# Patient Record
Sex: FEMALE | Race: WHITE | NOT HISPANIC OR LATINO | Employment: UNEMPLOYED | URBAN - METROPOLITAN AREA
[De-identification: names, ages, dates, MRNs, and addresses within clinical notes are randomized per-mention and may not be internally consistent; named-entity substitution may affect disease eponyms.]

---

## 2017-01-18 RX ORDER — GABAPENTIN 300 MG/1
300 CAPSULE ORAL 4 TIMES DAILY
COMMUNITY

## 2017-01-18 RX ORDER — SIMVASTATIN 20 MG
40 TABLET ORAL
COMMUNITY
End: 2020-11-10

## 2017-01-18 RX ORDER — DIPHENOXYLATE HYDROCHLORIDE AND ATROPINE SULFATE 2.5; .025 MG/1; MG/1
1 TABLET ORAL DAILY
COMMUNITY

## 2017-01-18 RX ORDER — CHOLECALCIFEROL (VITAMIN D3) 50 MCG
2000 TABLET ORAL DAILY
COMMUNITY

## 2017-01-23 ENCOUNTER — GENERIC CONVERSION - ENCOUNTER (OUTPATIENT)
Dept: OTHER | Facility: OTHER | Age: 57
End: 2017-01-23

## 2017-01-24 ENCOUNTER — APPOINTMENT (OUTPATIENT)
Dept: RADIOLOGY | Facility: HOSPITAL | Age: 57
End: 2017-01-24
Payer: COMMERCIAL

## 2017-01-24 ENCOUNTER — HOSPITAL ENCOUNTER (OUTPATIENT)
Facility: HOSPITAL | Age: 57
Setting detail: OUTPATIENT SURGERY
Discharge: HOME | End: 2017-01-24
Attending: NEUROLOGICAL SURGERY | Admitting: NEUROLOGICAL SURGERY
Payer: COMMERCIAL

## 2017-01-24 ENCOUNTER — ANESTHESIA (OUTPATIENT)
Dept: PERIOP | Facility: HOSPITAL | Age: 57
End: 2017-01-24
Payer: COMMERCIAL

## 2017-01-24 ENCOUNTER — ANESTHESIA EVENT (OUTPATIENT)
Dept: PERIOP | Facility: HOSPITAL | Age: 57
End: 2017-01-24
Payer: COMMERCIAL

## 2017-01-24 VITALS
OXYGEN SATURATION: 97 % | DIASTOLIC BLOOD PRESSURE: 70 MMHG | HEIGHT: 64 IN | RESPIRATION RATE: 16 BRPM | BODY MASS INDEX: 20.83 KG/M2 | TEMPERATURE: 97.2 F | HEART RATE: 82 BPM | SYSTOLIC BLOOD PRESSURE: 131 MMHG | WEIGHT: 122 LBS

## 2017-01-24 LAB
ABO GROUP BLD: NORMAL
BLD GP AB SCN SERPL QL: NEGATIVE
RH BLD: NEGATIVE

## 2017-01-24 PROCEDURE — C1820 GENERATOR NEURO RECHG BAT SY: HCPCS | Performed by: NEUROLOGICAL SURGERY

## 2017-01-24 PROCEDURE — 86850 RBC ANTIBODY SCREEN: CPT | Performed by: NEUROLOGICAL SURGERY

## 2017-01-24 PROCEDURE — 86900 BLOOD TYPING SEROLOGIC ABO: CPT | Performed by: NEUROLOGICAL SURGERY

## 2017-01-24 PROCEDURE — C1778 LEAD, NEUROSTIMULATOR: HCPCS | Performed by: NEUROLOGICAL SURGERY

## 2017-01-24 PROCEDURE — 72020 X-RAY EXAM OF SPINE 1 VIEW: CPT

## 2017-01-24 PROCEDURE — 86901 BLOOD TYPING SEROLOGIC RH(D): CPT | Performed by: NEUROLOGICAL SURGERY

## 2017-01-24 DEVICE — 3MM LEAD, 60 CM
Type: IMPLANTABLE DEVICE | Site: SPINE THORACIC | Status: NON-FUNCTIONAL
Brand: PENTA™
Removed: 2017-10-06

## 2017-01-24 DEVICE — IPG PROCLAIM XR5
Type: IMPLANTABLE DEVICE | Status: NON-FUNCTIONAL
Removed: 2017-10-06

## 2017-01-24 RX ORDER — ONDANSETRON 2 MG/ML
INJECTION INTRAMUSCULAR; INTRAVENOUS AS NEEDED
Status: DISCONTINUED | OUTPATIENT
Start: 2017-01-24 | End: 2017-01-24 | Stop reason: SURG

## 2017-01-24 RX ORDER — PROPOFOL 10 MG/ML
INJECTION, EMULSION INTRAVENOUS CONTINUOUS PRN
Status: DISCONTINUED | OUTPATIENT
Start: 2017-01-24 | End: 2017-01-24 | Stop reason: SURG

## 2017-01-24 RX ORDER — SUCCINYLCHOLINE CHLORIDE 20 MG/ML
INJECTION INTRAMUSCULAR; INTRAVENOUS AS NEEDED
Status: DISCONTINUED | OUTPATIENT
Start: 2017-01-24 | End: 2017-01-24 | Stop reason: SURG

## 2017-01-24 RX ORDER — MIDAZOLAM HYDROCHLORIDE 1 MG/ML
INJECTION INTRAMUSCULAR; INTRAVENOUS AS NEEDED
Status: DISCONTINUED | OUTPATIENT
Start: 2017-01-24 | End: 2017-01-24 | Stop reason: SURG

## 2017-01-24 RX ORDER — OXYCODONE HYDROCHLORIDE AND ACETAMINOPHEN 5; 325 MG/1; MG/1
2 TABLET ORAL EVERY 4 HOURS PRN
Status: DISCONTINUED | OUTPATIENT
Start: 2017-01-24 | End: 2017-01-24 | Stop reason: HOSPADM

## 2017-01-24 RX ORDER — SODIUM CHLORIDE, SODIUM LACTATE, POTASSIUM CHLORIDE, CALCIUM CHLORIDE 600; 310; 30; 20 MG/100ML; MG/100ML; MG/100ML; MG/100ML
20 INJECTION, SOLUTION INTRAVENOUS CONTINUOUS
Status: DISCONTINUED | OUTPATIENT
Start: 2017-01-24 | End: 2017-01-24 | Stop reason: HOSPADM

## 2017-01-24 RX ORDER — ROCURONIUM BROMIDE 10 MG/ML
INJECTION, SOLUTION INTRAVENOUS AS NEEDED
Status: DISCONTINUED | OUTPATIENT
Start: 2017-01-24 | End: 2017-01-24 | Stop reason: SURG

## 2017-01-24 RX ORDER — PROPOFOL 10 MG/ML
INJECTION, EMULSION INTRAVENOUS AS NEEDED
Status: DISCONTINUED | OUTPATIENT
Start: 2017-01-24 | End: 2017-01-24 | Stop reason: SURG

## 2017-01-24 RX ORDER — FENTANYL CITRATE/PF 50 MCG/ML
25 SYRINGE (ML) INJECTION
Status: DISCONTINUED | OUTPATIENT
Start: 2017-01-24 | End: 2017-01-24 | Stop reason: HOSPADM

## 2017-01-24 RX ORDER — TRIAMCINOLONE ACETONIDE 55 UG/1
SPRAY, METERED NASAL
COMMUNITY
End: 2020-08-31 | Stop reason: ALTCHOICE

## 2017-01-24 RX ORDER — BUPIVACAINE HYDROCHLORIDE AND EPINEPHRINE 5; 5 MG/ML; UG/ML
INJECTION, SOLUTION EPIDURAL; INTRACAUDAL; PERINEURAL AS NEEDED
Status: DISCONTINUED | OUTPATIENT
Start: 2017-01-24 | End: 2017-01-24 | Stop reason: HOSPADM

## 2017-01-24 RX ORDER — FENTANYL CITRATE 50 UG/ML
INJECTION, SOLUTION INTRAMUSCULAR; INTRAVENOUS AS NEEDED
Status: DISCONTINUED | OUTPATIENT
Start: 2017-01-24 | End: 2017-01-24 | Stop reason: SURG

## 2017-01-24 RX ADMIN — CEFAZOLIN SODIUM 2000 MG: 2 SOLUTION INTRAVENOUS at 15:05

## 2017-01-24 RX ADMIN — SUCCINYLCHOLINE CHLORIDE 100 MG: 20 INJECTION, SOLUTION INTRAMUSCULAR; INTRAVENOUS at 15:05

## 2017-01-24 RX ADMIN — FENTANYL CITRATE 100 MCG: 50 INJECTION, SOLUTION INTRAMUSCULAR; INTRAVENOUS at 15:30

## 2017-01-24 RX ADMIN — FENTANYL CITRATE 100 MCG: 50 INJECTION, SOLUTION INTRAMUSCULAR; INTRAVENOUS at 15:05

## 2017-01-24 RX ADMIN — PROPOFOL 100 MCG/KG/MIN: 10 INJECTION, EMULSION INTRAVENOUS at 15:00

## 2017-01-24 RX ADMIN — DEXAMETHASONE SODIUM PHOSPHATE 4 MG: 10 INJECTION INTRAMUSCULAR; INTRAVENOUS at 15:40

## 2017-01-24 RX ADMIN — PROPOFOL 150 MG: 10 INJECTION, EMULSION INTRAVENOUS at 15:05

## 2017-01-24 RX ADMIN — MIDAZOLAM HYDROCHLORIDE 2 MG: 1 INJECTION, SOLUTION INTRAMUSCULAR; INTRAVENOUS at 15:05

## 2017-01-24 RX ADMIN — ONDANSETRON 4 MG: 2 INJECTION INTRAMUSCULAR; INTRAVENOUS at 16:23

## 2017-01-24 RX ADMIN — PROPOFOL 50 MG: 10 INJECTION, EMULSION INTRAVENOUS at 15:30

## 2017-01-24 RX ADMIN — OXYCODONE HYDROCHLORIDE AND ACETAMINOPHEN 2 TABLET: 5; 325 TABLET ORAL at 18:55

## 2017-01-24 RX ADMIN — SODIUM CHLORIDE, SODIUM LACTATE, POTASSIUM CHLORIDE, AND CALCIUM CHLORIDE: .6; .31; .03; .02 INJECTION, SOLUTION INTRAVENOUS at 13:50

## 2017-01-24 RX ADMIN — ROCURONIUM BROMIDE 5 MG: 10 INJECTION, SOLUTION INTRAVENOUS at 15:05

## 2017-02-06 ENCOUNTER — ALLSCRIPTS OFFICE VISIT (OUTPATIENT)
Dept: OTHER | Facility: OTHER | Age: 57
End: 2017-02-06

## 2017-02-22 ENCOUNTER — GENERIC CONVERSION - ENCOUNTER (OUTPATIENT)
Dept: OTHER | Facility: OTHER | Age: 57
End: 2017-02-22

## 2017-02-25 ENCOUNTER — HOSPITAL ENCOUNTER (EMERGENCY)
Facility: HOSPITAL | Age: 57
Discharge: HOME | End: 2017-02-25
Attending: EMERGENCY MEDICINE | Admitting: EMERGENCY MEDICINE
Payer: COMMERCIAL

## 2017-02-25 VITALS
HEART RATE: 125 BPM | RESPIRATION RATE: 16 BRPM | SYSTOLIC BLOOD PRESSURE: 112 MMHG | WEIGHT: 125 LBS | TEMPERATURE: 97 F | HEIGHT: 64 IN | DIASTOLIC BLOOD PRESSURE: 60 MMHG | BODY MASS INDEX: 21.34 KG/M2

## 2017-02-25 DIAGNOSIS — H53.9 VISUAL CHANGES: Primary | ICD-10-CM

## 2017-02-25 PROCEDURE — 99283 EMERGENCY DEPT VISIT LOW MDM: CPT

## 2017-02-25 RX ORDER — PILOCARPINE HYDROCHLORIDE 10 MG/ML
1 SOLUTION/ DROPS OPHTHALMIC 4 TIMES DAILY
Status: DISCONTINUED | OUTPATIENT
Start: 2017-02-25 | End: 2017-02-25

## 2017-02-25 RX ORDER — TETRACAINE HYDROCHLORIDE 5 MG/ML
2 SOLUTION OPHTHALMIC ONCE
Status: DISCONTINUED | OUTPATIENT
Start: 2017-02-25 | End: 2017-02-25

## 2017-02-25 RX ORDER — TROPICAMIDE 10 MG/ML
1 SOLUTION/ DROPS OPHTHALMIC ONCE
Status: DISCONTINUED | OUTPATIENT
Start: 2017-02-25 | End: 2017-02-25

## 2017-02-25 RX ORDER — CYCLOPENTOLATE HYDROCHLORIDE 10 MG/ML
1 SOLUTION/ DROPS OPHTHALMIC ONCE
Status: DISCONTINUED | OUTPATIENT
Start: 2017-02-25 | End: 2017-02-25

## 2017-02-25 RX ORDER — PROPARACAINE HYDROCHLORIDE 5 MG/ML
SOLUTION/ DROPS OPHTHALMIC
Status: DISCONTINUED
Start: 2017-02-25 | End: 2017-02-25 | Stop reason: HOSPADM

## 2017-03-06 ENCOUNTER — ALLSCRIPTS OFFICE VISIT (OUTPATIENT)
Dept: OTHER | Facility: OTHER | Age: 57
End: 2017-03-06

## 2017-05-30 ENCOUNTER — TRANSCRIBE ORDERS (OUTPATIENT)
Dept: RADIOLOGY | Facility: CLINIC | Age: 57
End: 2017-05-30

## 2017-05-30 ENCOUNTER — HOSPITAL ENCOUNTER (OUTPATIENT)
Dept: RADIOLOGY | Facility: CLINIC | Age: 57
Discharge: HOME | End: 2017-05-30
Payer: COMMERCIAL

## 2017-05-30 ENCOUNTER — ALLSCRIPTS OFFICE VISIT (OUTPATIENT)
Dept: OTHER | Facility: OTHER | Age: 57
End: 2017-05-30

## 2017-05-30 DIAGNOSIS — M54.50 LOW BACK PAIN: ICD-10-CM

## 2017-05-30 DIAGNOSIS — M54.14 RADICULOPATHY OF THORACIC REGION: ICD-10-CM

## 2017-05-30 DIAGNOSIS — G89.4 CHRONIC PAIN SYNDROME: ICD-10-CM

## 2017-05-30 DIAGNOSIS — M54.16 RADICULOPATHY OF LUMBAR REGION: ICD-10-CM

## 2017-05-30 PROCEDURE — 72072 X-RAY EXAM THORAC SPINE 3VWS: CPT

## 2017-06-01 LAB
ALBUMIN SERPL BCP-MCNC: 4.2 G/DL (ref 3.6–5.1)
BUN SERPL-MCNC: 10 MG/DL (ref 7–25)
BUN/CREA RATIO (HISTORICAL): NORMAL (CALC) (ref 6–22)
CALCIUM SERPL-MCNC: 9.4 MG/DL (ref 8.6–10.4)
CHLORIDE SERPL-SCNC: 105 MMOL/L (ref 98–110)
CO2 SERPL-SCNC: 26 MMOL/L (ref 20–31)
CREAT SERPL-MCNC: 0.86 MG/DL (ref 0.5–1.05)
EGFR AFRICAN AMERICAN (HISTORICAL): 86 ML/MIN/1.73M2
EGFR-AMERICAN CALC (HISTORICAL): 74 ML/MIN/1.73M2
GLUCOSE (HISTORICAL): 76 MG/DL (ref 65–99)
PHOSPHATE SERPL-MCNC: 3.6 MG/DL (ref 2.5–4.5)
POTASSIUM SERPL-SCNC: 4.1 MMOL/L (ref 3.5–5.3)
SODIUM SERPL-SCNC: 140 MMOL/L (ref 135–146)

## 2017-06-06 ENCOUNTER — GENERIC CONVERSION - ENCOUNTER (OUTPATIENT)
Dept: OTHER | Facility: OTHER | Age: 57
End: 2017-06-06

## 2017-06-20 ENCOUNTER — GENERIC CONVERSION - ENCOUNTER (OUTPATIENT)
Dept: OTHER | Facility: OTHER | Age: 57
End: 2017-06-20

## 2017-06-20 ENCOUNTER — HOSPITAL ENCOUNTER (OUTPATIENT)
Dept: RADIOLOGY | Facility: HOSPITAL | Age: 57
Discharge: HOME | End: 2017-06-20
Attending: ANESTHESIOLOGY
Payer: COMMERCIAL

## 2017-06-20 DIAGNOSIS — M54.14 RADICULOPATHY OF THORACIC REGION: ICD-10-CM

## 2017-06-20 PROCEDURE — 72146 MRI CHEST SPINE W/O DYE: CPT

## 2017-06-26 ENCOUNTER — GENERIC CONVERSION - ENCOUNTER (OUTPATIENT)
Dept: OTHER | Facility: OTHER | Age: 57
End: 2017-06-26

## 2017-07-14 ENCOUNTER — GENERIC CONVERSION - ENCOUNTER (OUTPATIENT)
Dept: OTHER | Facility: OTHER | Age: 57
End: 2017-07-14

## 2017-07-14 ENCOUNTER — ALLSCRIPTS OFFICE VISIT (OUTPATIENT)
Dept: OTHER | Facility: OTHER | Age: 57
End: 2017-07-14

## 2017-07-17 ENCOUNTER — ALLSCRIPTS OFFICE VISIT (OUTPATIENT)
Dept: OTHER | Facility: OTHER | Age: 57
End: 2017-07-17

## 2017-07-19 ENCOUNTER — GENERIC CONVERSION - ENCOUNTER (OUTPATIENT)
Dept: OTHER | Facility: OTHER | Age: 57
End: 2017-07-19

## 2017-08-08 ENCOUNTER — ALLSCRIPTS OFFICE VISIT (OUTPATIENT)
Dept: OTHER | Facility: OTHER | Age: 57
End: 2017-08-08

## 2017-08-08 ENCOUNTER — GENERIC CONVERSION - ENCOUNTER (OUTPATIENT)
Dept: OTHER | Facility: OTHER | Age: 57
End: 2017-08-08

## 2017-08-17 ENCOUNTER — GENERIC CONVERSION - ENCOUNTER (OUTPATIENT)
Dept: OTHER | Facility: OTHER | Age: 57
End: 2017-08-17

## 2017-09-20 ENCOUNTER — GENERIC CONVERSION - ENCOUNTER (OUTPATIENT)
Dept: OTHER | Facility: OTHER | Age: 57
End: 2017-09-20

## 2017-09-28 ENCOUNTER — ALLSCRIPTS OFFICE VISIT (OUTPATIENT)
Dept: OTHER | Facility: OTHER | Age: 57
End: 2017-09-28

## 2017-10-03 RX ORDER — ESTRADIOL 0.07 MG/D
1 PATCH TRANSDERMAL WEEKLY
COMMUNITY
End: 2020-05-07

## 2017-10-03 NOTE — PRE-PROCEDURE INSTRUCTIONS
Pre-Surgery Instructions:   Medication Instructions    Ascorbic Acid (VITAMIN C PO) Patient was instructed by Physician and understands   Cholecalciferol (VITAMIN D) 2000 UNITS tablet Patient was instructed by Physician and understands   estradiol (CLIMARA) 0 075 MG/24HR Patient was instructed by Physician and understands   Fexofenadine HCl (ALLEGRA ALLERGY PO) Patient was instructed by Physician and understands   gabapentin (NEURONTIN) 300 mg capsule Instructed patient per Anesthesia Guidelines   multivitamin [facility]) TABS Patient was instructed by Physician and understands   Sertraline HCl (ZOLOFT PO) Instructed patient per Anesthesia Guidelines   simvastatin (ZOCOR) 20 mg tablet Patient was instructed by Physician and understands   Triamcinolone Acetonide 55 MCG/ACT AERO Patient was instructed by Physician and understands  Pre-op Showering Instructions for Surgery Patients    Before your operation, you play an important role in decreasing your risk for infection by washing with special antiseptic soap  This is an effective way to reduce bacteria on the skin which may help to prevent infections at the surgical site  Please read the following directions in advance  1  In the week before your operation, purchase a 4 ounce bottle of antiseptic soap containing chlorhexidine gluconate (CHG)  4%  Some brand names include: Aplicare®, Endure, and Hibiclens®  The cost is usually less than $5 00   For your convenience, the [facility] carries the soap   It may also be available at your doctors office or pre-admission testing center, and at most retail pharmacies   If you are allergic or sensitive to soaps containing CHG, please let your doctor know so another antiseptic can be suggested   CHG antiseptic soap is for external use only  2   The day before your operation, follow these instructions carefully to get ready   Please clean linens (sheets) on your bed; you should sleep on clean sheets after your evening shower   Get clean towels and washcloth ready - you need enough for 2 showers   Set aside clean underwear, pajamas, and clothing to wear after the showers     Reminders:   DO NOT use any other soap or body rinse on your skin during or after the antiseptic showers   DO NOT use lotion, powder, deodorant, or perfume/aftershave of any kind on your skin after your antiseptic shower   DO NOT shave any body parts in the 24 hours/day before your operation   DO NOT get the antiseptic soap in your eyes, ears, nose, mouth, or vaginal area    3  You will need to shower the night before AND the morning of your surgery  Shower 1:   The first evening before the operation, take the first shower   First, shampoo your hair with regular shampoo and rinse it completely before you use the antiseptic soap  After washing and rinsing your hair, rinse your body   Next, use a clean washcloth to apply the antiseptic soap and wash your body from the neck down to your toes using ½ bottle of the antiseptic soap   You will use the other ½ bottle for the second shower   Clean the area where your incision will be; lather this area well for about 2 minutes   If you are having head or neck surgery, wash areas with the antiseptic soap   Rinse yourself completely with running water   Use a clean towel to dry off   Wear clean underwear and clothing/pajamas  Shower 2   The morning of your operation, take the second shower following the same steps as Shower 1 using the second ½ of the bottle of antiseptic soap   Use clean cloths and towels to wash and dry yourself   Wear clean underwear and clothing

## 2017-10-03 NOTE — PRE-PROCEDURE INSTRUCTIONS
Pre-Surgery Instructions:   Medication Instructions    estradiol (CLIMARA) 0 075 MG/24HR Patient was instructed by Physician and understands

## 2017-10-05 ENCOUNTER — GENERIC CONVERSION - ENCOUNTER (OUTPATIENT)
Dept: OTHER | Facility: OTHER | Age: 57
End: 2017-10-05

## 2017-10-05 ENCOUNTER — ANESTHESIA EVENT (OUTPATIENT)
Dept: PERIOP | Facility: HOSPITAL | Age: 57
End: 2017-10-05
Payer: COMMERCIAL

## 2017-10-05 RX ORDER — BUPIVACAINE HYDROCHLORIDE AND EPINEPHRINE 5; 5 MG/ML; UG/ML
30 INJECTION, SOLUTION EPIDURAL; INTRACAUDAL; PERINEURAL ONCE
Status: CANCELLED | OUTPATIENT
Start: 2017-10-06

## 2017-10-06 ENCOUNTER — ANESTHESIA (OUTPATIENT)
Dept: PERIOP | Facility: HOSPITAL | Age: 57
End: 2017-10-06
Payer: COMMERCIAL

## 2017-10-06 ENCOUNTER — APPOINTMENT (OUTPATIENT)
Dept: RADIOLOGY | Facility: HOSPITAL | Age: 57
End: 2017-10-06
Payer: COMMERCIAL

## 2017-10-06 ENCOUNTER — HOSPITAL ENCOUNTER (OUTPATIENT)
Facility: HOSPITAL | Age: 57
Setting detail: OUTPATIENT SURGERY
Discharge: HOME | End: 2017-10-06
Attending: NEUROLOGICAL SURGERY | Admitting: NEUROLOGICAL SURGERY
Payer: COMMERCIAL

## 2017-10-06 VITALS
OXYGEN SATURATION: 99 % | HEART RATE: 80 BPM | BODY MASS INDEX: 21.51 KG/M2 | SYSTOLIC BLOOD PRESSURE: 132 MMHG | WEIGHT: 126 LBS | TEMPERATURE: 98.2 F | HEIGHT: 64 IN | DIASTOLIC BLOOD PRESSURE: 69 MMHG | RESPIRATION RATE: 18 BRPM

## 2017-10-06 LAB
ABO GROUP BLD: NORMAL
BLD GP AB SCN SERPL QL: NEGATIVE
GLUCOSE SERPL-MCNC: 162 MG/DL (ref 65–140)
RH BLD: NEGATIVE
SPECIMEN EXPIRATION DATE: NORMAL

## 2017-10-06 PROCEDURE — 86901 BLOOD TYPING SEROLOGIC RH(D): CPT | Performed by: NEUROLOGICAL SURGERY

## 2017-10-06 PROCEDURE — 82948 REAGENT STRIP/BLOOD GLUCOSE: CPT

## 2017-10-06 PROCEDURE — 86900 BLOOD TYPING SEROLOGIC ABO: CPT | Performed by: NEUROLOGICAL SURGERY

## 2017-10-06 PROCEDURE — 72070 X-RAY EXAM THORAC SPINE 2VWS: CPT

## 2017-10-06 PROCEDURE — 86850 RBC ANTIBODY SCREEN: CPT | Performed by: NEUROLOGICAL SURGERY

## 2017-10-06 RX ORDER — TRAMADOL HYDROCHLORIDE 50 MG/1
50 TABLET ORAL EVERY 6 HOURS PRN
Status: DISCONTINUED | OUTPATIENT
Start: 2017-10-06 | End: 2017-10-09 | Stop reason: HOSPADM

## 2017-10-06 RX ORDER — GLYCOPYRROLATE 0.2 MG/ML
INJECTION INTRAMUSCULAR; INTRAVENOUS AS NEEDED
Status: DISCONTINUED | OUTPATIENT
Start: 2017-10-06 | End: 2017-10-06 | Stop reason: SURG

## 2017-10-06 RX ORDER — OXYCODONE HYDROCHLORIDE AND ACETAMINOPHEN 5; 325 MG/1; MG/1
2 TABLET ORAL EVERY 4 HOURS PRN
Status: CANCELLED | OUTPATIENT
Start: 2017-10-06

## 2017-10-06 RX ORDER — FLUTICASONE PROPIONATE 50 MCG
1 SPRAY, SUSPENSION (ML) NASAL DAILY
COMMUNITY

## 2017-10-06 RX ORDER — CHLORHEXIDINE GLUCONATE 0.12 MG/ML
RINSE ORAL
Status: COMPLETED
Start: 2017-10-06 | End: 2017-10-06

## 2017-10-06 RX ORDER — SUCCINYLCHOLINE CHLORIDE 20 MG/ML
INJECTION INTRAMUSCULAR; INTRAVENOUS AS NEEDED
Status: DISCONTINUED | OUTPATIENT
Start: 2017-10-06 | End: 2017-10-06 | Stop reason: SURG

## 2017-10-06 RX ORDER — FENTANYL CITRATE 50 UG/ML
INJECTION, SOLUTION INTRAMUSCULAR; INTRAVENOUS AS NEEDED
Status: DISCONTINUED | OUTPATIENT
Start: 2017-10-06 | End: 2017-10-06 | Stop reason: SURG

## 2017-10-06 RX ORDER — CHLORHEXIDINE GLUCONATE 0.12 MG/ML
15 RINSE ORAL ONCE
Status: COMPLETED | OUTPATIENT
Start: 2017-10-06 | End: 2017-10-06

## 2017-10-06 RX ORDER — MIDAZOLAM HYDROCHLORIDE 1 MG/ML
INJECTION INTRAMUSCULAR; INTRAVENOUS AS NEEDED
Status: DISCONTINUED | OUTPATIENT
Start: 2017-10-06 | End: 2017-10-06 | Stop reason: SURG

## 2017-10-06 RX ORDER — ONDANSETRON 2 MG/ML
INJECTION INTRAMUSCULAR; INTRAVENOUS AS NEEDED
Status: DISCONTINUED | OUTPATIENT
Start: 2017-10-06 | End: 2017-10-06 | Stop reason: SURG

## 2017-10-06 RX ORDER — FENTANYL CITRATE/PF 50 MCG/ML
25 SYRINGE (ML) INJECTION
Status: DISCONTINUED | OUTPATIENT
Start: 2017-10-06 | End: 2017-10-09 | Stop reason: HOSPADM

## 2017-10-06 RX ORDER — PROPOFOL 10 MG/ML
INJECTION, EMULSION INTRAVENOUS AS NEEDED
Status: DISCONTINUED | OUTPATIENT
Start: 2017-10-06 | End: 2017-10-06 | Stop reason: SURG

## 2017-10-06 RX ORDER — PROPOFOL 10 MG/ML
INJECTION, EMULSION INTRAVENOUS CONTINUOUS PRN
Status: DISCONTINUED | OUTPATIENT
Start: 2017-10-06 | End: 2017-10-06 | Stop reason: SURG

## 2017-10-06 RX ORDER — BUPIVACAINE HYDROCHLORIDE AND EPINEPHRINE 5; 5 MG/ML; UG/ML
INJECTION, SOLUTION EPIDURAL; INTRACAUDAL; PERINEURAL AS NEEDED
Status: DISCONTINUED | OUTPATIENT
Start: 2017-10-06 | End: 2017-10-06 | Stop reason: HOSPADM

## 2017-10-06 RX ORDER — SODIUM CHLORIDE, SODIUM LACTATE, POTASSIUM CHLORIDE, CALCIUM CHLORIDE 600; 310; 30; 20 MG/100ML; MG/100ML; MG/100ML; MG/100ML
20 INJECTION, SOLUTION INTRAVENOUS CONTINUOUS
Status: DISPENSED | OUTPATIENT
Start: 2017-10-06 | End: 2017-10-07

## 2017-10-06 RX ORDER — ROCURONIUM BROMIDE 10 MG/ML
INJECTION, SOLUTION INTRAVENOUS AS NEEDED
Status: DISCONTINUED | OUTPATIENT
Start: 2017-10-06 | End: 2017-10-06 | Stop reason: SURG

## 2017-10-06 RX ORDER — ONDANSETRON 2 MG/ML
4 INJECTION INTRAMUSCULAR; INTRAVENOUS EVERY 6 HOURS PRN
Status: DISCONTINUED | OUTPATIENT
Start: 2017-10-06 | End: 2017-10-09 | Stop reason: HOSPADM

## 2017-10-06 RX ORDER — SUCCINYLCHOLINE CHLORIDE 20 MG/ML
INJECTION INTRAMUSCULAR; INTRAVENOUS AS NEEDED
Status: DISCONTINUED | OUTPATIENT
Start: 2017-10-06 | End: 2017-10-06

## 2017-10-06 RX ADMIN — PROPOFOL 200 MG: 10 INJECTION, EMULSION INTRAVENOUS at 12:15

## 2017-10-06 RX ADMIN — ROCURONIUM BROMIDE 5 MG: 10 INJECTION, SOLUTION INTRAVENOUS at 12:15

## 2017-10-06 RX ADMIN — CHLORHEXIDINE GLUCONATE 15 ML: 1.2 RINSE ORAL at 10:55

## 2017-10-06 RX ADMIN — FENTANYL CITRATE 50 MCG: 50 INJECTION, SOLUTION INTRAMUSCULAR; INTRAVENOUS at 12:15

## 2017-10-06 RX ADMIN — PROPOFOL 150 MCG/KG/MIN: 10 INJECTION, EMULSION INTRAVENOUS at 12:20

## 2017-10-06 RX ADMIN — TRAMADOL HYDROCHLORIDE 50 MG: 50 TABLET, COATED ORAL at 14:57

## 2017-10-06 RX ADMIN — SODIUM CHLORIDE, SODIUM LACTATE, POTASSIUM CHLORIDE, AND CALCIUM CHLORIDE 20 ML/HR: .6; .31; .03; .02 INJECTION, SOLUTION INTRAVENOUS at 13:50

## 2017-10-06 RX ADMIN — MIDAZOLAM HYDROCHLORIDE 2 MG: 1 INJECTION, SOLUTION INTRAMUSCULAR; INTRAVENOUS at 12:15

## 2017-10-06 RX ADMIN — ONDANSETRON 4 MG: 2 INJECTION INTRAMUSCULAR; INTRAVENOUS at 13:26

## 2017-10-06 RX ADMIN — CHLORHEXIDINE GLUCONATE 15 ML: 0.12 RINSE ORAL at 10:55

## 2017-10-06 RX ADMIN — SODIUM CHLORIDE, SODIUM LACTATE, POTASSIUM CHLORIDE, AND CALCIUM CHLORIDE 20 ML/HR: .6; .31; .03; .02 INJECTION, SOLUTION INTRAVENOUS at 11:12

## 2017-10-06 RX ADMIN — SUCCINYLCHOLINE CHLORIDE 100 MG: 20 INJECTION, SOLUTION INTRAMUSCULAR; INTRAVENOUS at 12:15

## 2017-10-06 RX ADMIN — CEFAZOLIN SODIUM 2000 MG: 2 SOLUTION INTRAVENOUS at 12:14

## 2017-10-06 RX ADMIN — GLYCOPYRROLATE 0.2 MG: 0.2 INJECTION, SOLUTION INTRAMUSCULAR; INTRAVENOUS at 12:15

## 2017-10-06 RX ADMIN — DEXAMETHASONE SODIUM PHOSPHATE 8 MG: 10 INJECTION INTRAMUSCULAR; INTRAVENOUS at 12:20

## 2017-10-06 RX ADMIN — FENTANYL CITRATE 25 MCG: 50 INJECTION INTRAMUSCULAR; INTRAVENOUS at 14:18

## 2017-10-06 NOTE — ANESTHESIA POSTPROCEDURE EVALUATION
Post-Op Assessment Note      CV Status:  Stable    Mental Status:  Alert and awake    Hydration Status:  Euvolemic    PONV Controlled:  Controlled    Airway Patency:  Patent    Post Op Vitals Reviewed:  Yes              /59 (10/06/17 1342)    Temp (!) 97 4 °F (36 3 °C) (10/06/17 1342)    Pulse 88 (10/06/17 1342)   Resp 13 (10/06/17 1342)    SpO2

## 2017-10-06 NOTE — ANESTHESIA PREPROCEDURE EVALUATION
Review of Systems/Medical History  Patient summary reviewed  Chart reviewed  History of anesthetic complications PONV    Cardiovascular  Hyperlipidemia,    Pulmonary       GI/Hepatic            Endo/Other     GYN       Hematology   Musculoskeletal  Back pain , chronic back pain,        Neurology    Neuromuscular disease ,    Psychology   Psychiatric history, Anxiety,            Physical Exam    Airway    Mallampati score: I         Dental   Comment: Scattered rear crowns,     Cardiovascular  Cardiovascular exam normal    Pulmonary  Pulmonary exam normal     Other Findings        Anesthesia Plan  ASA Score- 2       Anesthesia Type- general        Induction- intravenous      Informed Consent  Anesthetic plan and risks discussed with patient

## 2017-10-06 NOTE — ADDENDUM NOTE
Addendum  created 10/06/17 1682 by Diamond Church MD    Anesthesia Review and Sign - Ready for Procedure

## 2017-10-06 NOTE — OP NOTE
OPERATIVE REPORT  PATIENT NAME: Kerry Antonio    :  1960  MRN: [id number]  Pt Location: [facility] OR ROOM 01    SURGERY DATE: 10/6/2017    Surgeon(s) and Role:     * Keisha Watts MD - Primary     * Janette Singh PA-C - Assisting  No qualified resident available for exposure  PA Present throughout procedure  Assisted with exposure and wound closure providing essential assistance throughout including retraction and hemostasis to achieve the necessary surgical goal       Preop Diagnosis:  Chronic pain syndrome [G89 4]  Radiculopathy of lumbar region [M54 16]    Post-Op Diagnosis Codes:     * Chronic pain syndrome [G89 4]     * Radiculopathy of lumbar region [M54 16]    Specimen(s):  * No specimens in log *    Estimated Blood Loss:   Minimal    Drains:       Anesthesia Type:   General    Operative Indications:  Chronic pain syndrome [G89 4]  Radiculopathy of lumbar region [M54 16]  Thoracic radiculitis    Complications:   None    Procedure and Technique:  1  Removal of a thoracic spinal cord stimulator paddle electrode placed via T9-10 laminectomy    2  Removal of a left buttock implantable pulse generator and      Operative Findings:  SSEPSStable  Lead removed at its entry site    Removed [facility]  [employer] IPG    Indications for procedure; This is a 68-year-old female with a long-standing history of chronic pain involving the lower back and lower extremities  Previous placed SCS that delivers efficacy, but reports new thoracic radiculitis and requests removal  The risks and benefits of the procedure reviewed with the patient and family and they wished to proceed  Description of procedure; The patient was identified and brought to the operating room where they underwent the induction of general anesthesia  Placed prone on the operating room table with chest rolls  Prepped and draped in the usual sterile fashion  2g of Ancef was administered as antibiotic prophylaxis  Bilateral lower extremity SCDs were placed for DVT prophylaxis  A timeout was then performed  Live fluoroscopic imaging was performed in order to lindsay appropriate positioning of the spine as well as prior skin incision  The thoracic midline incision was incised with a 10 blade after it was anesthetized with 1% lidocaine with epinephrine  Bovie electrocautery dissected the subcutaneous tissue down to the underlying remaining spinous processes  Fluoroscopic imaging then confirmed appropriate level  Once this was confirmed the muscle was dissected off in a medial to lateral direction exposing the underlying remianing lamina of T9 and T10  The strain relief loop was also identified and removed from the tissue  The lead was followed to its entry  Into the epidural space  A small laminectomy was then completed utilizing the [employer] drill bit and Kerrison punches to expose the base of the paddle  Nerve hook was then passed freely beneath the scar capsule freeing the base of the paddle  The paddle was then removed without resitance  Attention was then placed on the prior left buttock incision which was anesthetized with 1% lidocaine with epinephrine and incised with a 10 blade  Bovie electrocautery dissected the subcutaneous tissue  Then using sharp and blunt dissection the previous placed generator was freed from the pocket and  from the distal portion of the electrode and removed  The lead was then pulled through to the thoracic incision and removed  Both incisions were copiously irrigated with antibiotic-induced solution  Closure was begun  Thoracic paraspinal musculature and fascia was reapproximated with an interrupted 2-0 Vicryl plus suture  The skin was then approximated with an interrupted inverted 2-0 Vicryl plus suture with stainless steel staples for the skin  The left buttock incision had the pocket closed upon itself with an interrupted 2-0 Vicryl plus suture   The skin was approximated with interrupted inverted 2-0 Vicryl plus suture with stainless steel staples for the skin  Routine sterile dressings were then applied  At the end of the case all counts were reported to be correct  There was no breaks in surgical technique or complications encountered during the procedure  The patient woke from anesthesia in stable condition being taken to the recovery room         I was present for the entire procedure    Patient Disposition:  extubated and stable    SIGNATURE: Nicola Barraza MD  DATE: October 6, 2017  TIME: 1:34 PM

## 2017-10-06 NOTE — INTERIM OP NOTE
REMOVAL OF A THORACIC SPINAL CORD STIMULATOR PLACED VIA LAMINECTOMY AND REMOVAL OF LEFT BUTTOCK IMPLANTABLE PULSE GENERATOR (IMPULSE MONITORING-SSEP)  Postoperative Note  PATIENT NAME: Pao Camera  : 1960  MRN: [id number]  [facility] RO[facility]    Surgery Date: 10/6/2017    Preop Diagnosis:  Chronic pain syndrome [G89 4]  Radiculopathy of lumbar region [M54 16]    Post-Op Diagnosis Codes:     * Chronic pain syndrome [G89 4]     * Radiculopathy of lumbar region [M54 16]    Procedure(s) (LRB):  REMOVAL OF A THORACIC SPINAL CORD STIMULATOR PLACED VIA LAMINECTOMY AND REMOVAL OF LEFT BUTTOCK IMPLANTABLE PULSE GENERATOR (IMPULSE MONITORING-SSEP) (N/A)    Surgeon(s) and Role:     * Ganesh Zavala MD - Primary     * Esequiel Suh PA-C - Assisting    Specimens:  * No specimens in log *    Estimated Blood Loss:   Minimal    Anesthesia Type:   General     Findings:    SSEPS Stable    Complications:   None    SIGNATURE: Ganesh Zavala MD   DATE: 2017   TIME: 1:26 PM

## 2017-10-06 NOTE — DISCHARGE INSTRUCTIONS
Follow Up Dr Ebony Ceballos 2 weeks  Remove Dressing 3 days  Antibiotics prescription at pharmacy  Oxycodone prescription for pain  No NSAIDs ( aspirin, motrin, ibuprofen, etc  ) for 2 weeks may resume after that    [address] [place]    What happens when I go home after the Spinal Cord Stimulator? Surgical site: The dressing may be removed after 3 days and left off  There is no special care for your incision  Activity:   Resume normal activity level, but limit bending, lifting, and twisting for 2 weeks  Bathing: You may shower after 3 days  Driving: You may resume driving in one week  Pain Medicine: If you were given a prescription for a new pain medicine, it can be taken in addition to any pain medicine you are already taking  The new medicine should only be needed for about one week to help with any surgical pain  Take the new medicine only as needed  Please call the office at [phone] if you have any of the followin  Redness, swelling, heat, or unusual drainage (green, yellow, white, smelly)   from the surgical site  2  Heavy bleeding from the surgical site  3  Chills or fever above 101 degrees   4  Pain not relieved by medicine  5  Questions or concerns about your surgery

## 2017-10-09 ENCOUNTER — GENERIC CONVERSION - ENCOUNTER (OUTPATIENT)
Dept: OTHER | Facility: OTHER | Age: 57
End: 2017-10-09

## 2017-10-20 ENCOUNTER — ALLSCRIPTS OFFICE VISIT (OUTPATIENT)
Dept: OTHER | Facility: OTHER | Age: 57
End: 2017-10-20

## 2017-11-20 ENCOUNTER — GENERIC CONVERSION - ENCOUNTER (OUTPATIENT)
Dept: OTHER | Facility: OTHER | Age: 57
End: 2017-11-20

## 2017-12-15 ENCOUNTER — GENERIC CONVERSION - ENCOUNTER (OUTPATIENT)
Dept: OTHER | Facility: OTHER | Age: 57
End: 2017-12-15

## 2018-01-10 NOTE — MISCELLANEOUS
Message   Recorded as Task   Date: 06/20/2017 10:23 AM, Created By: Josephine Brar   Task Name: Miscellaneous   Assigned To: [facility] [place] [facility],Team   Regarding Patient: Alva Cuba, Status: Active   Comment:    Josephine Brar - 20 Jun 2017 10:23 AM     TASK CREATED  phoen call from Ryan Doan with [facility] requesting a new rx for patient for mri without contrast, patient is only able to be in mri for ceratin amount of time  if any questions can call [phone]  Tianna Gonzalez - 20 Jun 2017 11:34 AM     TASK EDITED  s/w Jenny Moreland at [facility] MRI  Pt w/ [facility] SCS  No more than 30 min for mri w/ st  renaldo monitoring  Needs a new order for mri w/o contrast  Per adeline, adequate visualization has already been completed w/o contrast   new order can be faxed to [phone]    ***New MRI order please - "w/o contrast" Thanks! 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)   6  Lumbar radiculopathy, chronic (724 4) (M54 16)   7  Pain syndrome, chronic (338 4) (G89 4)   8  Postop check (V67 00) (Z09)   9  Pre-procedural examination (V72 84) (Z01 818)   10  Pre-procedure lab exam (V72 63) (Z01 812)   11  Spondylosis of lumbar region without myelopathy or radiculopathy (721 3) (M47 816)   12  Status post surgery (V45 89) (Z98 890)   13  Thoracic radiculitis (724 4) (M54 14)   14  Transverse 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) (G37 3)    Current Meds   1  Estradiol 0 1 MG/24HR Transdermal Patch Weekly; Therapy: (Recorded:18Oct2016) to Recorded   2   Gabapentin 300 MG Oral Capsule Recorded   3  Methocarbamol 750 MG Oral Tablet; TAKE 1 TABLET Every 6 hours PRN For muscle   spasms; Therapy: [id number] to (Evaluate:03Mar2017)  Requested for: 83Yaz8593; Last   Rx:[id number] Ordered   4  PredniSONE 10 MG Oral Tablet; Take as directed according to info sheet given at office; Therapy: [id number] to (Evaluate:06Jun2017)  Requested for: 79IGK8541; Last   Rx:[id number] Ordered   5  Simvastatin 20 MG Oral Tablet Recorded   6  Zoloft 50 MG Oral Tablet (Sertraline HCl) Recorded    Allergies    1  Demerol TABS   2   TEGretol TABS    Signatures   Electronically signed by : Tianna Gillette, ; Jun 20 2017  1:17PM EST                       (Author)

## 2018-01-10 NOTE — PROGRESS NOTES
Assessment    1  Lumbar radiculopathy, chronic (724 4) (M54 16)   2  Pain syndrome, chronic (338 4) (G89 4)    Plan  Pain syndrome, chronic    · Follow-up PRN Evaluation and Treatment  Follow-up  Status: Complete  Done:  [id number]   Ordered; For: Pain syndrome, chronic; Ordered By: Claudean Loving Performed:  Due: [id number]    Discussion/Summary    This is a 61 yo female with LBP and bilateral leg pain History of transverse myelitis  Underwent successful SCS trial with Dr Carol Burns  She is now 6 weeks from SCS placement and doing well  No programming adjustments needed  Not taking pain medications  F/U as needed with me  Chief Complaint  Patient presents to office for consult of SCS placement after trial       Post-Op  HPI: She is 6 weeks from SCS placement and doing very well  She is noting near complete pain relief  Denies incisional pain  No fevers or chills  She does have mild residual left sided muscle spasms near the incision which has been improving, as well as mild IPG discomfort with pushing n the device  She is off all pain medications  She is very pleased with the coverage and pain relief  In review, this is a very pleasant 61 yo female with a greater than 20 year history of chronic pain involving her lower back and bilateral Legs  She reports a constant sharp lower back pain She also has bilateral anterior thigh sharp pain, and intermittent bilateral posterior leg pain to her ankle  Her sharp back pain includes muscle spams and is worse than her leg pain  Her pain started after being diagnosed with spinal MS versus transverse myelitis in 1999  She presents now after undergoing a successful SCS trial with Dr Carol Burns obtaining 95% pain relief  She used primarily Burst programming  She also takes Gabapentin with some relief of her leg pain  Review of Systems    Constitutional: No fever, no chills, feels well, no tiredness, no recent weight gain or weight loss     Eyes: No complaints of eye pain, no red eyes, no eyesight problems, no discharge, no dry eyes, no itching of eyes  ENT: no complaints of earache, no loss of hearing, no nose bleeds, no nasal discharge, no sore throat, no hoarseness  Cardiovascular: No complaints of slow heart rate, no fast heart rate, no chest pain, no palpitations, no leg claudication, no lower extremity edema  Respiratory: No complaints of shortness of breath, no wheezing, no cough, no SOB on exertion, no orthopnea, no PND  Gastrointestinal: No complaints of abdominal pain, no constipation, no nausea or vomiting, no diarrhea, no bloody stools  Genitourinary: No complaints of dysuria, no incontinence, no pelvic pain, no dysmenorrhea, no vaginal discharge or bleeding  Musculoskeletal: limb pain and bilateral leg pain worse on right, but no arthralgias, no joint swelling, no myalgias, no joint stiffness and no limb swelling  Integumentary: No complaints of skin rash or lesions, no itching, no skin wounds, no breast pain or lump  Neurological: headache, tingling, limb weakness and difficulty walking, but no numbness, no confusion, no dizziness, no convulsions and no fainting  Psychiatric: anxiety and depression  Endocrine: No complaints of proptosis, no hot flashes, no muscle weakness, no deepening of the voice, no feelings of weakness  Hematologic/Lymphatic: No complaints of swollen glands, no swollen glands in the neck, does not bleed easily, does not bruise easily  Active Problems    1  Chronic left hip pain (495 13,281 49) (M25 552,G89 29)   2  Chronic low back pain (724 2,338 29) (M54 5,G89 29)   3  Chronic myofascial pain (729 1,338 29) (M79 1,G89 29)   4  Encounter for staple removal (V58 32) (Z48 02)   5  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)   6  Lumbar radiculopathy, chronic (724 4) (M54 16)   7  Pain syndrome, chronic (338 4) (G89 4)   8  Postop check (V67 00) (Z09)   9  Pre-procedural examination (V72 84) (Z01 818)   10   Pre-procedure lab exam (V72 63) (Z01 812)   11  Spondylosis of lumbar region without myelopathy or radiculopathy (721 3) (M47 816)   12  Status post surgery (V45 89) (Z98 890)   13  Transverse 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) (G37 3)    Social History    · Marital History - Currently    · Never A Smoker   · Never Drank Alcohol  The social history was reviewed and updated today  Current Meds   1  Estradiol 0 1 MG/24HR Transdermal Patch Weekly; Therapy: (Recorded:18Oct2016) to Recorded   2  Gabapentin 300 MG Oral Capsule Recorded   3  Methocarbamol 750 MG Oral Tablet; TAKE 1 TABLET Every 6 hours PRN For muscle   spasms; Therapy: [id number] to (Evaluate:03Mar2017)  Requested for: 57Frw7488; Last   Rx:[id number] Ordered   4  Simvastatin 20 MG Oral Tablet Recorded   5  Zoloft 50 MG Oral Tablet Recorded    The medication list was reviewed and updated today  Allergies    1  Demerol TABS   2  TEGretol TABS    Vitals   Recorded: 10ENK5237 08:09AM   Temperature 98 7 F, Tympanic   Heart Rate 84, L Brachial Artery   Pulse Quality Normal, L Brachial Artery   Respiration Quality Normal   Respiration 16   Systolic 95, LUE, Sitting   Diastolic 78, LUE, Sitting   Height 5 ft 4 in   Weight 126 lb    BMI Calculated 21 63   BSA Calculated 1 61     Physical Exam     Constitutional Patient appears healthy and well developed  Head and Face Normal on inspection  Eyes Fundoscopic exam is benign  Neck No JVD  Carotid pulses: 2+ and equal bilaterally, without bruits  Respiratory Auscultation of lungs: Clear to auscultation  No rales, rhonchi, wheezes appreciated over the lungs bilaterally  Cardiovascular Auscultation of heart: Rhythm is regular, no gallop or rubs  No heart murmur appreciated  Peripheral vascular exam: Pedal Pulses: 2+ and equal bilaterally  Radial pulses: 2+ and equal bilaterally  Extremities: Peripheral circulation is normal    Abdomen Soft, nontender, and nondistended without guarding, rigidity or rebound tenderness  Musculo: Spine Contour is normal  No tenderness of the spine billaterally  Skin warm and dry  Incisions well healed  Neurologic - Mental Status: Alert and Oriented x3  Mood and affect: Affect is normal   Memory is grossly intact  Attention is WNL  [place] Judi Speech: Language is fluent  Cranial Nerve Exam:  2nd cranial nerve: Intact  3rd, 4th, and 6th cranial nerves: Intact  5th cranial nerve: Intact  7th cranial nerve: Intact  8th cranial nerve: Intact  9th and 10th cranial nerves: Intact  11th cranial nerve: Intact  12th cranial nerve: Intact  Motor System General Motor Strength: No pronator drift and no parietal drift  Motor System - Upper Extremities: Muscle strength: 5/5 bilaterally  Muscle tone: Normal bilaterally  Motor System - Lower Extremities: Muscle strength: 5/5 bilaterally  Muscle tone: Normal bilaterally  Reflexes: DTR's are brisk, symmetric and 2+ bilaterally  Babinski's reflex is down going bilaterally symmetrical bilaterally  Coordination: Normal    Sensory: Sensation grossly intact to light touch  Sensation grossly intact to pinprick  Gait and Station: Cherry Log with a normal gait  Results/Data    I personally reviewed the SCS trial, MRI T/L spine in detail with the patient  X-ray Review Reviewed images and report of SCS trial with Dr Caridad Candelario  Single midline 8 contact St  Renalod perc lead placed to top T8 using middle contacts  Primary BurstDR programming  MRI Review Reviewed images and report of MRI thoracic spine 6/2016  Mild DDD  T9-10 facet overgrowth  Remnants of possible old transverse myelitis  Reviewed images and report of MRI lumbar spine 6/2016  Mild DDD  Surgery Scheduling Form  Neurosurgery Procedure Scheduling Form [facility] Standard:   Location: [facility]   Confirmation Number:   Procedure Date: 1/24/17   Requested Time:   Surgeon: Osiel Downey  Co-Surgeon:   [facility] Required:   Bed: Out Patient - No Bed Required     Anesthesia: General      PROCEDURE DETAILS   Procedure: Placement Thoracic SCS with left buttock IPG  Laterality/Level: Anticipated frozen section:   CPT Code(s): Y3468919, [id number]   Pre-Op Diagnosis: Chronic pain, lumbar radic  Dx Code(s): G89 4, M54 16    Length of Procedure: 2 hours  Equipment: C-Arm  Equipment Needs: Prone, Motors (tceMEP), EMG, SSEP, Impulse Monitoring Confirmation # : [id number]  Implants/Representative: St  Renaldo   Is the patient able to walk up a flight of stairs, walk up a hill or do heavy housework WITHOUT having chest pain or shortness of breath? REGISTRATION & FINANCIAL CLEARANCE    FA Initials:   Insurance: [facility]   Policy Number: [id number] Group Number:     PRE-ADMISSION TESTING/CLINICAL INFORMATION   PAT Location: Outpatient Testing Elsewhere     Communication Barrier:   Primary Care Physician: Miesha Card  CONSULTS NEEDED:   Anesthesia Consult:   Medical Consult:   Cardiac Consult:     ALLERGIES AND ALERTS   Latex Allergy: NO   Penicillin Allergy: NO   Malignant Hyperthermia: NO   Diabetic Patient: NO   Height: 5'4"  Weight: 57 15 KG  COMMENTS   Scheduling Information Provided by: LAUREN  IN OFFICE USE   Urgency:   Craniotomy Details:   Lab Studies Ordered: Full Labs Ordered, Medically Cleared   REINOLD  Films   Bracing:   Bone Stimulator   Return to office 2 Weeks post op  Past Medical History    The active problems and past medical history were reviewed and updated today  Surgical History    The surgical history was reviewed and updated today  Family History    The family history was reviewed and updated today         Signatures   Electronically signed by : DARRELL Loredo ; Mar  7 2017 11:11AM EST                       (Author)

## 2018-01-10 NOTE — MISCELLANEOUS
Message   Recorded as Task   Date: 09/29/2016 09:37 AM, Created By: Florencia Montiel   Task Name: Care Coordination   Assigned To: [facility] stim,Team   Regarding Patient: Sonja Marie, Status: In Progress   Comment:    Ayla Tinoco - 29 Sep 2016 9:37 AM     TASK CREATED  Pt called requesting to schedule stim trial  She is very eager to proceed  States she has left numerous messages w/no return call  I don't see any messages noted in tasks but it is noted on the stim spreadsheet that she would call after discussing with the billing department on cost this was noted after her education class  Please call pt to discuss @ [phone]   Timothy Redman - 30 Sep 2016 3:31 PM     TASK REASSIGNED: Previously Assigned To [facility] stim,Team   Lydia Simpson - 03 Oct 2016 9:58 AM     TASK EDITED  Pt to be a St Renaldo SCS trial with Dr Rae Saba  Pt completed education  Pt had psych eval done  Received benefit confirmation from [facility], No precert required by insurance  Pt is ready to be scheduled  Attempt to reach pt to discuss trial dates  LM asking for return call  Lydia Simpson - 36 Oct 2016 12:00 PM     TASK EDITED   Spoke with pt who is scheduled as follows:  10/18/16 @ 0915 to sign consents  10/24/16 arriving @ 0945 for 1030 trial  10/28/16 @ 1330 for lead removal  Pt denies any blood thinning medication or allergy to PCN  Paperwork for 10/18/16 povs sent via email and copies scanned into pt's chart  Email sent to [facility] to inform them of trial dates        Active Problems    1  Chronic left hip pain (525 57,943 20) (M25 552,G89 29)   2  Chronic low back pain (724 2,338 29) (M54 5,G89 29)   3  Chronic myofascial pain (729 1,338 29) (M79 1,G89 29)   4  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)   5  Pain syndrome, chronic (338 4) (G89 4)   6  Pre-procedural examination (V72 84) (Z01 818)   7  Pre-procedure lab exam (V72 63) (Z01 812)   8   Spondylosis of lumbar region without myelopathy or radiculopathy (721 3) (M47 816)   9  Status post surgery (V45 89) (Z98 890)   10  Transverse 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) (G37 3)    Current Meds   1  Gabapentin 300 MG Oral Capsule Recorded   2  Simvastatin 20 MG Oral Tablet Recorded   3  Zoloft 50 MG Oral Tablet (Sertraline HCl) Recorded    Allergies    1  Demerol TABS   2  TEGretol TABS    Plan  Chronic low back pain, Pre-procedure lab exam, Spondylosis of lumbar region without  myelopathy or radiculopathy    · (1) CBC/PLT/DIFF; Status:Active - Retrospective Authorization; Requested  for:03Oct2016;   Pain syndrome, chronic, Status post surgery    · Cephalexin 500 MG Oral Capsule (Keflex); TAKE 1 CAPSULE 4 TIMES DAILY  UNTIL GONE   · XR SPINE THORACIC 2 VIEW; Status:Active - Retrospective Authorization; Requested  for:03Oct2016;   Pre-procedure lab exam    · (1) APTT; Status:Active - Retrospective Authorization; Requested for:03Oct2016;    · (1) COMPREHENSIVE METABOLIC PANEL; Status:Active - Retrospective Authorization; Requested for:03Oct2016;    · (1) PT WITH INR; Status:Active - Retrospective Authorization;  Requested HCL:[id number];     Signatures   Electronically signed by : Samina Beltrán, ; Oct  3 2016 12:32PM EST                       (Author)

## 2018-01-11 NOTE — RESULT NOTES
Message   Recorded as Task   Date: 12/12/2016 02:35 PM, Created By: Ayla Tinoco   Task Name: Follow Up   Assigned To: [facility] [place] procedure,Team   Regarding Patient: Nereyda Pandey, Status: Active   Comment:    Ayla Tinoco - 12 Dec 2016 2:35 PM     TASK CREATED  S/p right L5/S1 TFESI on 12/8/16 w/Dr Ileana Vaughn in [facility]  No f/u scheduled    Please call 12/15/16   Marlene Norris - 15 Dec 2016 9:38 AM     TASK EDITED  Msg H# for pt to cb   Ayla Tinoco - 15 Dec 2016 1:18 PM     TASK EDITED  Pt lm on [place] procedure f/u line 12/15/16 @ 01 [phone]  -returning our call  -If we feel it necessary to call her back - Please call cell #[phone]  -she is doing really well, injection definately took the edge off & she can drive now which she wasn't able to do prior to injection   Jen Carranza - 16 Dec 3603 76:62 AM     TASK EDITED   Ilia James - 16 Dec 2016 11:56 AM     TASK REPLIED TO: Previously Assigned To Ilia James  aware        Signatures   Electronically signed by : Belinda Ryder, ; Dec 16 2016 11:58AM EST                       (Author)

## 2018-01-11 NOTE — MISCELLANEOUS
Message  Post operative call s/p surgery on w/ DR Aguilar Child s/p ;  Post operative pain:doing well only taking plain Tylenol   Antibiotic: cephalexin   Gastrointestinal (BM, NVD, Appetite /Fluid intake) no problems   Urine -denies problems   Call if you develop any signs or symptoms of incision (s) redness, drainage, dehiscence, or a fever of 101 or higher , uncontrolled nausea and /or vomiting  Wound/dressing; as per postoperative discharge instructions remove dressings today showered   Post operative Hygiene: Gently wash surgical incision(s) with a clean wash cloth and pat dry using a clean wash towel  Reinforced use clean wash cloth and towel daily, use antibacterial soap, change bed linens 1-2 times per week and pajamas several times per week  Post operative Activity: Ambulate as tolerate, Lift no greater than 10 LBS until 6 weeks, Avoid submersion in water x 3 weeks, and refrain for bending or twisting until about 4 weeks -light duty until then      Patent verbalized an understanding of information communicated      Signatures   Electronically signed by : JANKI Umana; Oct  9 2017  5:48PM EST                       (Author)

## 2018-01-12 VITALS
HEIGHT: 64 IN | DIASTOLIC BLOOD PRESSURE: 68 MMHG | WEIGHT: 130 LBS | BODY MASS INDEX: 22.2 KG/M2 | HEART RATE: 76 BPM | SYSTOLIC BLOOD PRESSURE: 100 MMHG

## 2018-01-12 NOTE — PROGRESS NOTES
Assessment   1  Pain syndrome, chronic (338 4) (G89 4)  2  Lumbar radiculopathy, chronic (724 4) (M54 16)  3  Thoracic radiculitis (724 4) (M54 14)    Plan    · ECG 12-LEAD; Status:Active - Retrospective Authorization; Requested for:[id number];   Perform:[facility]; Due:16Plb7110; Last Updated By:Yves Garvin; 7/17/2017   3:50:39 PM;Ordered; For:Health Maintenance; Ordered By:Cathie Wooten;    · Schedule Surgery Treatment  Procedure  Status: Complete  Done: 20XUG8038  Ordered; For: Thoracic radiculitis; Ordered By: Lizet Jacques  Performed:   Due:   14TPU5013; Last Updated By: Marva Neely; 7/17/2017 3:37:06 PM    Discussion/Summary    This is a 59 yo female with LBP and bilateral leg pain History of transverse myelitis  Underwent successful SCS trial with Dr Jaciel Sullivan  She is now 6 months from SCS placement and doing well with pain relief  She is having persistent thoracic radiculopathy/neuralgia since implant  Underwent programming in office today to confirm lead placement  She does program at equal amplitudes across lead confirming its flat, but her midline is on column 4 demonstrating it favoring the left side  MRI thoracic spine with lead placement dorsal, flat, slightly favoring left side  There is CSF around the cord at all aspects  Nerve roots visualized without compression  She is very concerned with the persistent pain  Options discussed including observation, conservative measures, and surgery  Her lead does favor the left side, but is not canted and is not compressing the cord or a nerve root  I am unsure why she has this pain  She does have the history of cord signal changes at T8 and T9 c/w MS or transverse myelitis  This may pay a role  Based on this the lead can be moved to a different level, shifted more midline, and/or changed to a smaller lead  Option of removal addressed as well    After extensive discussion we will proceed with removing her current Penta lead that enters at T9-10 and place a new smaller Exclaim paddle entering at T10-11 to cover T9-10 interspace which will require further laminectomy at T9-10  She understands this is no guarantee of pain relief of her thoracic radiculopathy and that this coverage may be slightly different than her present pain coverage given different level and lead  I have asked her to also undergo an injection with Dr Rigoberto Neri, perhaps a trigger point to see if this gives improvement  The patient was counseled regarding diagnostic results, prognosis, risks and benefits of treatment options  total time of encounter was 50 minutes  The risks of surgery were reviewed with the patient and family and they wish to proceed  These risks include, but are not limited to bleeding, infection, paralysis, numbness, nerve injury, CSF leak, bowel/bladder incontinence, device malfunction, and failure of treatment  All questions were answered and appropriate contact information was given in case questions arise in the future  They will undergo preoperative clearance and be scheduled for surgery in the near future  Chief Complaint  Patient presents to office for consult of SCS placement after trial       History of Present Illness  She is 6 months from SCS placement  She continues to do very well with the therapy  She presents with persistent left sided thoracic pain that radiates around her side  It has been present since the surgery and has not improved  She describes it as a sharp left rib and mid thoracic pain  She presented 6 weeks from SCS placement and doing very well  She is noting near complete pain relief  Denies incisional pain  No fevers or chills  She does have mild residual left sided muscle spasms near the incision which has been improving, as well as mild IPG discomfort with pushing n the device  She is off all pain medications  She is very pleased with the coverage and pain relief      In review, this is a very pleasant 61 yo female with a greater than 20 year history of chronic pain involving her lower back and bilateral Legs  She reports a constant sharp lower back pain She also has bilateral anterior thigh sharp pain, and intermittent bilateral posterior leg pain to her ankle  Her sharp back pain includes muscle spams and is worse than her leg pain  Her pain started after being diagnosed with spinal MS versus transverse myelitis in 1999  She presents now after undergoing a successful SCS trial with Dr Nelly Jovel obtaining 95% pain relief  She used primarily Burst programming  She also takes Gabapentin with some relief of her leg pain  Review of Systems    Constitutional: No fever, no chills, feels well, no tiredness, no recent weight gain or weight loss  Eyes: No complaints of eye pain, no red eyes, no eyesight problems, no discharge, no dry eyes, no itching of eyes  ENT: no complaints of earache, no loss of hearing, no nose bleeds, no nasal discharge, no sore throat, no hoarseness  Cardiovascular: No complaints of slow heart rate, no fast heart rate, no chest pain, no palpitations, no leg claudication, no lower extremity edema  Respiratory: No complaints of shortness of breath, no wheezing, no cough, no SOB on exertion, no orthopnea, no PND  Gastrointestinal: No complaints of abdominal pain, no constipation, no nausea or vomiting, no diarrhea, no bloody stools  Genitourinary: No complaints of dysuria, no incontinence, no pelvic pain, no dysmenorrhea, no vaginal discharge or bleeding  Musculoskeletal: limb pain and bilateral leg pain worse on right, but no arthralgias, no joint swelling, no myalgias, no joint stiffness and no limb swelling  Integumentary: No complaints of skin rash or lesions, no itching, no skin wounds, no breast pain or lump  Neurological: headache, tingling, limb weakness and difficulty walking, but no numbness, no confusion, no dizziness, no convulsions and no fainting  Psychiatric: anxiety and depression  Endocrine: No complaints of proptosis, no hot flashes, no muscle weakness, no deepening of the voice, no feelings of weakness  Hematologic/Lymphatic: No complaints of swollen glands, no swollen glands in the neck, does not bleed easily, does not bruise easily  ROS reviewed  Active Problems   1  Chronic left hip pain (253 36,034 99) (M25 552,G89 29)  2  Chronic low back pain (724 2,338 29) (M54 5,G89 29)  3  Chronic myofascial pain (729 1,338 29) (M79 1,G89 29)  4  Encounter for staple removal (V58 32) (Z48 02)  5  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)  6  Lumbar radiculopathy, chronic (724 4) (M54 16)  7  Pain syndrome, chronic (338 4) (G89 4)  8  Postop check (V67 00) (Z09)  9  Pre-procedural examination (V72 84) (Z01 818)  10  Pre-procedure lab exam (V72 63) (Z01 812)  11  Spondylosis of lumbar region without myelopathy or radiculopathy (721 3) (M47 816)  12  Status post surgery (V45 89) (Z98 890)  13  Thoracic radiculitis (724 4) (M54 14)  14  Transverse 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) (G37 3)    Past Medical History   1  History of Anxiety disorder (300 00) (F41 9)  2  History of hypercholesterolemia (V12 29) (Z86 39)  3  History of Other 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) (G04 89)    The active problems and past medical history were reviewed and updated today  Surgical History   1  History of Hysterectomy  2  History of Oophorectomy Unilateral Right Side    The surgical history was reviewed and updated today  Family History  Family History   1  Family history of Heart Disease (V17 49)  2  Family history of Hypertension (V17 49)    The family history was reviewed and updated today  Social History    · Marital History - Currently    · Never A Smoker   · Never Drank Alcohol  The social history was reviewed and updated today  Current Meds  1  Estradiol 0 1 MG/24HR Transdermal Patch Weekly; Therapy: (Recorded:18Oct2016) to Recorded  2  Gabapentin 300 MG Oral Capsule Recorded  3   Methocarbamol 750 MG Oral Tablet; TAKE 1 TABLET Every 6 hours PRN For muscle   spasms; Therapy: 21LUJ5340 to (Evaluate:03Mar2017)  Requested for: 23Wih5789; Last   Rx:[id number] Ordered  4  Simvastatin 20 MG Oral Tablet Recorded  5  Zoloft 50 MG Oral Tablet Recorded    The medication list was reviewed and updated today  The medication list was reviewed and updated today  Allergies   1  Demerol TABS  2  TEGretol TABS    Vitals  Vital Signs    Recorded: 20QPE5885 02:19PM   Temperature 98 1 F, Tympanic   Heart Rate 80, L Brachial Artery   Pulse Quality Normal, L Brachial Artery   Respiration Quality Normal   Respiration 14   Systolic 675, LUE, Sitting   Diastolic 73, LUE, Sitting   Height 5 ft 4 in   Weight 129 lb    BMI Calculated 22 14   BSA Calculated 1 62     Physical Exam     Constitutional Patient appears healthy and well developed  Head and Face Normal on inspection  Eyes Fundoscopic exam is benign  Neck No JVD  Carotid pulses: 2+ and equal bilaterally, without bruits  Respiratory Auscultation of lungs: Clear to auscultation  No rales, rhonchi, wheezes appreciated over the lungs bilaterally  Cardiovascular Auscultation of heart: Rhythm is regular, no gallop or rubs  No heart murmur appreciated  Peripheral vascular exam: Pedal Pulses: 2+ and equal bilaterally  Radial pulses: 2+ and equal bilaterally  Extremities: Peripheral circulation is normal    Abdomen Soft, nontender, and nondistended without guarding, rigidity or rebound tenderness  Musculo: Spine Contour is normal  No tenderness of the spine billaterally  Skin warm and dry  Incisions well healed  Neurologic - Mental Status: Alert and Oriented x3  Mood and affect: Affect is normal   Memory is grossly intact  Attention is WNL  Ariadna Skipper Speech: Language is fluent  Cranial Nerve Exam:  2nd cranial nerve: Intact  3rd, 4th, and 6th cranial nerves: Intact  5th cranial nerve: Intact  7th cranial nerve: Intact  8th cranial nerve: Intact   9th and 10th cranial nerves: Intact  11th cranial nerve: Intact  12th cranial nerve: Intact  Motor System General Motor Strength: No pronator drift and no parietal drift  Motor System - Upper Extremities: Muscle strength: 5/5 bilaterally  Muscle tone: Normal bilaterally  Motor System - Lower Extremities: Muscle strength: 5/5 bilaterally  Muscle tone: Normal bilaterally  Reflexes: DTR's are brisk, symmetric and 2+ bilaterally  Babinski's reflex is down going bilaterally symmetrical bilaterally  Coordination: Normal    Sensory: Sensation grossly intact to light touch  Sensation grossly intact to pinprick  Gait and Station: [place] with a normal gait  Results/Data  Diagnostic Studies Reviewed Neurosur[facility]:   I personally reviewed the Thoracic Xray and MRI thoracic spine in detail with the patient  X-ray Review Reviewed images and report of thoracic spine xray 5/2017  Stable lead placement at T8-9  Reviewed images and report of SCS trial with Dr Annetta Dandy  Single midline 8 contact St  Renaldo perc lead placed to top T8 using middle contacts  Primary BurstDR programming  MRI Review Reviewed images and report of MRI thoracic spine 6/2017  Stable lead placement dorsal, flat slightly favoring left side  There is CSF around the cord at all aspects  Nerve roots visualized without compression  Reviewed images and report of MRI thoracic spine 6/2016  Mild DDD  T9-10 facet overgrowth  Remnants of possible old transverse myelitis  Reviewed images and report of MRI lumbar spine 6/2016  Mild DDD  Future Appointments    Date/Time Provider Specialty Site   10/06/2017 09:30 AM DARRELL Rivers  Neurosurgery [address] OR   11/20/2017 08:00 AM DARRELL Rivers   [facility]urosurgery [facility]   [facility]2017 08[facility]   10/20/2017 09:00 [facility] Surgery Scheduling Form    Location:   Confirmation Number:   Procedure Date:   Requested Time:   Surgeon: Rita Latham  Co-Surgeon:   [facility] Required:   Bed:   Anesthesia: General      PROCEDURE DETAILS   Procedure: Revision with replacement thoracic SCS through new level laminectomy, revision left buttock SCS generator  Laterality/Level: Anticipated frozen section:   Pre-Op Diagnosis: Chronic pain, lumbar radic, throacic radic  Dx Code(s):   Length of Procedure: 2 hours  Equipment: C-Arm  Equipment Needs: Prone, Motors (tceMEP), EMG, SSEP  Implants/Representative: St  Renaldo   Is the patient able to walk up a flight of stairs, walk up a hill or do heavy housework WITHOUT having chest pain or shortness of breath? REGISTRATION & FINANCIAL CLEARANCE    FA Initials:   Insurance:   Policy Number: Group Number:     PRE-ADMISSION TESTING/CLINICAL INFORMATION   PAT Location:     Communication Barrier:   Primary Care Physician:     CONSULTS NEEDED:   Anesthesia Consult:   Medical Consult:   Cardiac Consult:     ALLERGIES AND ALERTS   Latex Allergy:   Penicillin Allergy:   Malignant Hyperthermia:   Diabetic Patient:   Height:   Weight:     COMMENTS   Scheduling Information Provided by:     IN OFFICE USE   Urgency:   Craniotomy Details:   Lab Studies Ordered: Full Labs Ordered, Medically Cleared  Films   Bracing:   Bone Stimulator   Return to office 2 Weeks post op   6 weeks        Signatures   Electronically signed by : DARRELL Xiao ; Jul 17 2017  6:15PM EST                       (Author)    Electronically signed by : DARRELL Xiao ; Jul 17 2017  6:21PM EST                       (Author)

## 2018-01-12 NOTE — MISCELLANEOUS
Message   Recorded as Task   Date: 06/24/2017 11:38 AM, Created By: Dar Disla   Task Name: Care Coordination   Assigned To: [facility] clerical,Team   Regarding Patient: Ana Sandoval, Status: In Progress   Comment:    Ilia James - 24 Jun 2017 11:38 AM     TASK CREATED  please schedule f/u with me   Marietta Henderson - 26 Jun 2017 8:31 AM     TASK IN PROGRESS   Marietta Henderson - 26 Jun 2017 8:38 AM     TASK EDITED  Pt is scheduled for 7/14/17 @ 1:45        Active Problems    1  Chronic left hip pain (328 39,628 96) (M25 552,G89 29)   2  Chronic low back pain (724 2,338 29) (M54 5,G89 29)   3  Chronic myofascial pain (729 1,338 29) (M79 1,G89 29)   4  Encounter for staple removal (V58 32) (Z48 02)   5  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)   6  Lumbar radiculopathy, chronic (724 4) (M54 16)   7  Pain syndrome, chronic (338 4) (G89 4)   8  Postop check (V67 00) (Z09)   9  Pre-procedural examination (V72 84) (Z01 818)   10  Pre-procedure lab exam (V72 63) (Z01 812)   11  Spondylosis of lumbar region without myelopathy or radiculopathy (721 3) (M47 816)   12  Status post surgery (V45 89) (Z98 890)   13  Thoracic radiculitis (724 4) (M54 14)   14  Transverse 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) (G37 3)    Current Meds   1  Estradiol 0 1 MG/24HR Transdermal Patch Weekly; Therapy: (Recorded:18Oct2016) to Recorded   2  Gabapentin 300 MG Oral Capsule Recorded   3  Methocarbamol 750 MG Oral Tablet; TAKE 1 TABLET Every 6 hours PRN For muscle   spasms; Therapy: 21YTF7238 to (Evaluate:03Mar2017)  Requested for: 58Gts7449; Last   Rx:[id number] Ordered   4  PredniSONE 10 MG Oral Tablet; Take as directed according to info sheet given at office; Therapy: 24LUE0730 to (Evaluate:06Jun2017)  Requested for: 24QIK0798; Last   Rx:[id number] Ordered   5  Simvastatin 20 MG Oral Tablet Recorded   6  Zoloft 50 MG Oral Tablet (Sertraline HCl) Recorded    Allergies    1  Demerol TABS   2   TEGretol TABS    Signatures   Electronically signed by : Meera Miller, ; Jun 26 2017  8:38AM EST                       (Author)

## 2018-01-12 NOTE — RESULT NOTES
Message   Recorded as Task   Date: 11/22/2016 09:27 AM, Created By: Sita Moran   Task Name: Miscellaneous   Assigned To: Maki Gallardo   Regarding Patient: Too Salmeron, Status: Active   Comment:    Maki Gallardo - 22 Nov 2016 9:27 AM     TASK CREATED  pt saw Dr Terry Herrera yesterday and then stopped by my office to see about setting up a nerve block per pt   pt will be scheduled for her perm stim and would like to have another inj prior to that??   Ilia James - 22 Nov 2016 9:51 AM     TASK REPLIED TO: Previously Assigned To [facility] [place] [facility],Team  what nerve block, where is her pain? Tianna Gonzalez - 22 Nov 2016 11:07 AM     TASK EDITED  s/w pt, states she is scheduled for perm stim 1/24 - sooner if a cx  Per pt, pain behind her right thigh and lateral side of R lower leg  Occassional pain in L leg  Per pt, pain interferes w/ driving  Pt denies blood thinning medication - prn excedrine  Advised pt, will d/w Dr Tato Marte and cb to advise  Ilia James - 22 Nov 2016 11:09 AM     TASK REPLIED TO: Previously Assigned To Ilia James  right L5/S1 X 2   Tianna Gonzalez - 22 Nov 2016 11:30 AM     TASK REASSIGNED: Previously Assigned To [facility] [place] [facility],Team   Maki Gallardo - 22 Nov 2016 11:36 AM     TASK REASSIGNED: Previously Assigned To Maki Gallardo  this a TFESI correct   Ilia James - 22 Nov 2016 12:16 PM     TASK REPLIED TO: Previously Assigned To Ilia James  yes   Maki Gallardo - 29 Nov 2016 2:06 PM     TASK EDITED  procedure schedule for 12/8/2016  pt denies bld thinners/antbx  awre if becomes ill/antbx/infection call to r/s, need for , npo 1 hr prior procedure, wear comfortable pants  pt verbally understands

## 2018-01-13 VITALS
HEART RATE: 90 BPM | BODY MASS INDEX: 21.24 KG/M2 | HEIGHT: 64 IN | TEMPERATURE: 98.2 F | SYSTOLIC BLOOD PRESSURE: 116 MMHG | WEIGHT: 124.38 LBS | DIASTOLIC BLOOD PRESSURE: 64 MMHG

## 2018-01-13 VITALS
DIASTOLIC BLOOD PRESSURE: 73 MMHG | TEMPERATURE: 98.1 F | HEIGHT: 64 IN | SYSTOLIC BLOOD PRESSURE: 128 MMHG | RESPIRATION RATE: 14 BRPM | WEIGHT: 129 LBS | HEART RATE: 80 BPM | BODY MASS INDEX: 22.02 KG/M2

## 2018-01-13 VITALS
BODY MASS INDEX: 21.51 KG/M2 | TEMPERATURE: 98 F | HEART RATE: 98 BPM | DIASTOLIC BLOOD PRESSURE: 50 MMHG | SYSTOLIC BLOOD PRESSURE: 121 MMHG | WEIGHT: 126 LBS | HEIGHT: 64 IN

## 2018-01-13 VITALS
HEART RATE: 84 BPM | RESPIRATION RATE: 16 BRPM | BODY MASS INDEX: 21.51 KG/M2 | TEMPERATURE: 98.7 F | WEIGHT: 126 LBS | SYSTOLIC BLOOD PRESSURE: 95 MMHG | HEIGHT: 64 IN | DIASTOLIC BLOOD PRESSURE: 78 MMHG

## 2018-01-13 VITALS
SYSTOLIC BLOOD PRESSURE: 116 MMHG | BODY MASS INDEX: 22.02 KG/M2 | HEART RATE: 78 BPM | HEIGHT: 64 IN | DIASTOLIC BLOOD PRESSURE: 58 MMHG | WEIGHT: 129 LBS | TEMPERATURE: 98.1 F

## 2018-01-13 NOTE — RESULT NOTES
Message   Recorded as Task   Date: 08/08/2017 02:45 PM, Created By: Jagruti Moreland   Task Name: Call Back   Assigned To: [facility] [place] [facility],Team   Regarding Patient: Nereyda Pandey, Status: Active   CommentLeane Sas - 08 Aug 2017 2:45 PM     TASK CREATED  Follow up call in 1wk to see how patient is doing after trigger point injection  Please call on 08/15/2017   Jagruti Moreland - 08 Aug 2017 3:04 PM     TASK REASSIGNED: Previously Assigned To Albino Tanner - 15 Aug 2017 11:59 AM     TASK EDITED  1st attempt LM on VM to CB   f/u to trigger point injection  Jagruti Moreland - 64 Aug 2017 2:05 PM     TASK EDITED  Patient states she got 50% relief  the first day  Pain level is a 8/10   Patient states she has surgery scheduled in August    Ilia James - 16 Aug 2017 8:04 PM     TASK REPLIED TO: Previously Assigned To Ilia James  aware        Signatures   Electronically signed by : Irving Ribera, ; Aug 17 2017  9:57AM EST                       (Author)

## 2018-01-13 NOTE — MISCELLANEOUS
Message   Recorded as Task   Date: 08/08/2017 01:37 PM, Created By: Viktoria Libman   Task Name: Care Coordination   Assigned To: [facility]   Regarding Patient: Talat Urrutia, Status: Active   Comment:    Tianna Gonzalez - 08 Aug 2017 1:37 PM     TASK CREATED  per sl, s/w pt  Advised it is possible that TPI's scheduled for today will not help w/ pain r/t scs  Pt may want to wait until she see's Dr Sarah Zavala for the scs removal  Pt stated that at this point, she is willing to try anything and would like to go ahead w/ TPI  Pt stated that she would also like to s/w Dr Valentino Cole re: options discussed w/ Dr Sarah Zavala  pt stated that she would feel better w/ Dr Mireille Walker opinion  Confirmed arrival at 1445  Dr Valentino Cole aware  Active Problems    1  Chronic left hip pain (387 60,901 64) (M25 552,G89 29)   2  Chronic low back pain (724 2,338 29) (M54 5,G89 29)   3  Chronic myofascial pain (729 1,338 29) (M79 1,G89 29)   4  Encounter for staple removal (V58 32) (Z48 02)   5  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)   6  Lumbar radiculopathy, chronic (724 4) (M54 16)   7  Pain syndrome, chronic (338 4) (G89 4)   8  Postop check (V67 00) (Z09)   9  Pre-procedural examination (V72 84) (Z01 818)   10  Pre-procedure lab exam (V72 63) (Z01 812)   11  Spondylosis of lumbar region without myelopathy or radiculopathy (721 3) (M47 816)   12  Status post surgery (V45 89) (Z98 890)   13  Thoracic radiculitis (724 4) (M54 14)   14  Transverse 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) (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)    Current Meds   1  Estradiol 0 1 MG/24HR Transdermal Patch Weekly; Therapy: (Recorded:18Oct2016) to Recorded   2  Gabapentin 300 MG Oral Capsule Recorded   3  Methocarbamol 750 MG Oral Tablet; TAKE 1 TABLET Every 6 hours PRN For muscle   spasms; Therapy: 28SNA1741 to (Evaluate:03Mar2017)  Requested for: 77Bly6436; Last   Rx:[id number] Ordered   4  Simvastatin 20 MG Oral Tablet Recorded   5  Zoloft 50 MG Oral Tablet (Sertraline HCl) Recorded    Allergies    1  Demerol TABS   2   TEGretol TABS    Signatures   Electronically signed by : Sandy Thomas, ; Aug  8 2017  1:37PM EST                       (Author)

## 2018-01-13 NOTE — MISCELLANEOUS
Message   Recorded as Task   Date: 02/22/2017 11:44 AM, Created By: Emmy Sky   Task Name: Miscellaneous   Assigned To: Torres Gomes   Regarding Patient: Leyda Muro, Status: Active   CommentChbecky Everett - 22 Feb 2017 11:44 AM     TASK CREATED  Patient back on denial WQ for no authorization for procedure on 10/24/16  On 1/10/17 I notified the billing department that no authorization was required per [facility]  Billing made a call to [facility] and they were told by Tonia Chacon "since authorization was not obtained, procedure was denied"  Lydia Simpson - 22 Feb 2017 1:43 PM     TASK EDITED  Gianfranco Joseph, I can only go by what [facility] benefit form states and it says no pre cert required  It is scanned in under stim  Annelise Rios - 22 Feb 2017 2:28 PM     TASK REPLIED TO: Previously Assigned To Emmy Sky  OK  I will tell them for the third time  Not sure what we can do from our end  Active Problems    1  Chronic left hip pain (211 53,832 42) (M25 552,G89 29)   2  Chronic low back pain (724 2,338 29) (M54 5,G89 29)   3  Chronic myofascial pain (729 1,338 29) (M79 1,G89 29)   4  Encounter for staple removal (V58 32) (Z48 02)   5  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)   6  Lumbar radiculopathy, chronic (724 4) (M54 16)   7  Pain syndrome, chronic (338 4) (G89 4)   8  Postop check (V67 00) (Z09)   9  Pre-procedural examination (V72 84) (Z01 818)   10  Pre-procedure lab exam (V72 63) (Z01 812)   11  Spondylosis of lumbar region without myelopathy or radiculopathy (721 3) (M47 816)   12  Status post surgery (V45 89) (Z98 890)   13  Transverse 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) (G37 3)    Current Meds   1  Estradiol 0 1 MG/24HR Transdermal Patch Weekly; Therapy: (Recorded:18Oct2016) to Recorded   2  Gabapentin 300 MG Oral Capsule Recorded   3  Simvastatin 20 MG Oral Tablet Recorded   4  Zoloft 50 MG Oral Tablet (Sertraline HCl) Recorded    Allergies    1  Demerol TABS   2   TEGretol TABS    Signatures Electronically signed by : Maciel Loyola, ; Feb 22 2017  3:00PM EST                       (Author)

## 2018-01-14 VITALS
WEIGHT: 129.25 LBS | HEIGHT: 64 IN | DIASTOLIC BLOOD PRESSURE: 58 MMHG | SYSTOLIC BLOOD PRESSURE: 110 MMHG | TEMPERATURE: 98.2 F | BODY MASS INDEX: 22.07 KG/M2

## 2018-01-14 NOTE — RESULT NOTES
Message   Recorded as Task   Date: 05/30/2017 03:24 PM, Created By: System   Task Name: Financial Auth   Assigned To: Martha Barahona   Regarding Patient: Anu Gardiner, Status: In Progress   Comment:    System - 30 May 2017 3:24 PM     MRI THORACIC SPINE W WO CONTRAST requires Financial [employer] Sigrid - 06 Jun 2017 8:22 AM     TASK IN PROGRESS   Marlene Ivan - 06 Jun 2017 8:29 AM     TASK EDITED  Auth submitted via [employer]; approved  Auth # [id number]  valid: 6/6/17 - 7/6/17  [facility]    I will inform patient  Marlnee Ivan - 06 Jun 2017 9:39 AM     TASK EDITED  Called and spoke with patient; she told me that she was told to [facility] which was not indicated in the task  I will call Sonal Phan and have to location changed to [facility]; she can call central scheduling to coordinate  Marlene Ivan - 06 Jun 2017 9:44 AM     TASK EDITED  Spoke with Husam Finch at [place] [address] for [address] ([phone]); site changed to [facility]

## 2018-01-15 NOTE — MISCELLANEOUS
Message   Recorded as Task   Date: 10/25/2016 09:04 AM, Created By: Vita Mcfadden   Task Name: Follow Up   Assigned To: [facility] [place] [facility]   Regarding Patient: Alva Cuba, Status: Active   Comment:    Tianna Gonzalez - 25 Oct 2016 9:04 AM     TASK CREATED  *** FYI ***  S/P SCS Trial 10/24/16  Lead pull 10/28/2016 @ 1330  S/w pt, states she has some soreness in the area of the procedure  Otherwise, feels the scs is helping  Advised pt to continue w/ Abx as prescribed, monitor for s/s of infection as per dc instructions, keep dressing dry / intact, limit bending lifting twisting  Pt verbalized understanding and confirmed 10/28 ov  Ilia James - 25 Oct 2016 9:36 AM     TASK REPLIED TO: Previously Assigned To [facility] [place] [facility],Team  aware        Active Problems    1  Chronic left hip pain (369 53,484 14) (M25 552,G89 29)   2  Chronic low back pain (724 2,338 29) (M54 5,G89 29)   3  Chronic myofascial pain (729 1,338 29) (M79 1,G89 29)   4  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)   5  Pain syndrome, chronic (338 4) (G89 4)   6  Pre-procedural examination (V72 84) (Z01 818)   7  Pre-procedure lab exam (V72 63) (Z01 812)   8  Spondylosis of lumbar region without myelopathy or radiculopathy (721 3) (M47 816)   9  Status post surgery (V45 89) (Z98 890)   10  Transverse 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) (G37 3)    Current Meds   1  Cephalexin 500 MG Oral Capsule (Keflex); TAKE 1 CAPSULE 4 TIMES DAILY UNTIL   GONE;   Therapy: 02GHH5257 to (Evaluate:10Oct2016); Last Rx:03Oct2016 Ordered   2  Cephalexin 500 MG Oral Capsule; TAKE 1 CAPSULE 4 TIMES DAILY UNTIL GONE;   Therapy: 67PWA1438 to (Evaluate:31Oct2016)  Requested for: 24Oct2016; Last   Rx:24Oct2016 Ordered   3  Estradiol 0 1 MG/24HR Transdermal Patch Weekly; Therapy: (Recorded:45Rqd2227) to Recorded   4  Gabapentin 300 MG Oral Capsule Recorded   5  Simvastatin 20 MG Oral Tablet Recorded   6   Zoloft 50 MG Oral Tablet (Sertraline HCl) Recorded    Allergies 1  Demerol TABS   2   TEGretol TABS    Signatures   Electronically signed by : Nohelia Schwarz, ; Oct 25 2016 11:31AM EST                       (Author)

## 2018-01-15 NOTE — MISCELLANEOUS
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)   5  Lumbar radiculopathy, chronic (724 4) (M54 16)   6  Pain syndrome, chronic (338 4) (G89 4)   7  Pre-procedural examination (V72 84) (Z01 818)   8  Pre-procedure lab exam (V72 63) (Z01 812)   9  Spondylosis of lumbar region without myelopathy or radiculopathy (721 3) (M47 816)   10  Status post surgery (V45 89) (Z98 890)   11  Transverse 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) (G37 3)    Current Meds   1  Cephalexin 500 MG Oral Capsule; TAKE 1 CAPSULE 4 times daily Please start AFTER   arriving home from surgery; Therapy: 98JRZ9325 to (Evaluate:30Jan2017)  Requested for: 10DRF8536; Last   Rx:23Jan2017; Status: ACTIVE - Retrospective By Protocol Authorization Ordered   2  Estradiol 0 1 MG/24HR Transdermal Patch Weekly; Therapy: (Recorded:18Oct2016) to Recorded   3  Gabapentin 300 MG Oral Capsule Recorded   4  Oxycodone-Acetaminophen 5-325 MG Oral Tablet; TAKE 1 TO 2 TABLETS EVERY 4   HOURS AS NEEDED FOR PAIN;   Therapy: 23Jan2017 to (Evaluate:31Jan2017); Last Rx:23Jan2017; Status: ACTIVE -   Retrospective By Protocol Authorization Ordered   5  Simvastatin 20 MG Oral Tablet Recorded   6  Zoloft 50 MG Oral Tablet (Sertraline HCl) Recorded    Allergies    1  Demerol TABS   2   TEGretol TABS    Signatures   Electronically signed by : Autumn Barrett RN; Jan 23 2017  9:54AM EST                       (Author)

## 2018-01-15 NOTE — PROGRESS NOTES
Chief Complaint  Placement of a thoracic spinal cord stimulator paddle electrode via T9-10 laminectomy, placement of a left buttock implantable pulse generator and electronic analysis complex programming spinal cord stimulator system postoperative period approximately one hour on 01/24/2017 by Dr Hector Valdez  History of Present Illness  HPI: The patient presents today for her two week incision check and staple removal  She states that 100% of her chronic pain is gone  The only complaint she has is the ravi pulling  Rafaela Bentley was present and offered the patient additional programming and teaching  Hospital Based Practices Required Assessment:   Pain Assessment   the patient states they do not have pain  Abuse And Domestic Violence Screen    Yes, the patient is safe at home  The patient states no one is hurting them  Depression And Suicide Screen  No, the patient has not had thoughts of hurting themself  No, the patient has not felt depressed in the past 7 days  Readiness To Learn: Receptive  Barriers To Learning: none  Preferred Learning: verbal   Education Completed: medications, further treatment/follow-up and treatment/procedure   Teaching Method: verbal   Person Taught: patient   Evaluation Of Learning: verbalized/demonstrated understanding      Active Problems    1  Chronic left hip pain (924 97,354 69) (M25 552,G89 29)   2  Chronic low back pain (724 2,338 29) (M54 5,G89 29)   3  Chronic myofascial pain (729 1,338 29) (M79 1,G89 29)   4  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)   5  Lumbar radiculopathy, chronic (724 4) (M54 16)   6  Pain syndrome, chronic (338 4) (G89 4)   7  Pre-procedural examination (V72 84) (Z01 818)   8  Pre-procedure lab exam (V72 63) (Z01 812)   9  Spondylosis of lumbar region without myelopathy or radiculopathy (721 3) (M47 816)   10  Status post surgery (V45 89) (Z98 890)   11  Transverse 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) (G37 3)    Current Meds   1   Estradiol 0 1 MG/24HR Transdermal Patch Weekly; Therapy: (Recorded:10Rgu1027) to Recorded   2  Gabapentin 300 MG Oral Capsule Recorded   3  Simvastatin 20 MG Oral Tablet Recorded   4  Zoloft 50 MG Oral Tablet Recorded    Allergies    1  Demerol TABS   2  TEGretol TABS    Vitals  Signs    Temperature: 98 7 F, Tympanic  Heart Rate: 77, L Radial  Pulse Quality: Normal, L Radial  Respiration Quality: Normal  Respiration: 16  Systolic: 529, LUE, Sitting  Diastolic: 62, LUE, Sitting  Height: 5 ft 4 in  Weight: 126 lb   BMI Calculated: 21 63  BSA Calculated: 1 61    Procedure    Wound Exam: well healed with no sign of infection, C/D/I, lower portion of thoracic incision is scabbed  There was mild erythema around the wound edges  Procedure Note: 12 thoracic and 8 left buttock staples were removed  Assessment    1  Postop check (V67 00) (Z09)   2  Encounter for staple removal (V58 32) (Z48 02)    Discussion/Summary    Proper incision care and s/s of infection were reviewed with the patient  She was asked to contact the office with any complaints of chills, fever >101, edema, erythema, drainage or gaping of incision  She was advised to cleanse the incisions with mild soap and water, avoiding lotions and creams  The patient will continue with her current restrictions until seen by Dr Cristopher Espinoza in four weeks  Future Appointments    Date/Time Provider Specialty Site   03/06/2017 08:00 AM DARRELL Knapp   Neurosurgery [facility]     Signatures   Electronically signed by : Micki Craven RN; Feb 6 2017  9:26AM EST                       (Author)    Electronically signed by : Quinn Hatchet, M D ; Feb 6 2017  1:31PM EST                       (Author)

## 2018-01-16 NOTE — MISCELLANEOUS
Message   Recorded as Task   Date: 01/25/2016 10:03 AM, Created By: Tanmay Chavez   Task Name: Follow Up   Assigned To: [facility] [place] [facility],Team   Regarding Patient: Anu Gardiner, Status: Active   Comment:    Letty Gomez - 25 Jan 2016 10:03 AM     TASK CREATED  Caller: Self; General Medical Question; [phone] (Home)  Received VM from pt  on [address] [place] [address] triage line from 65 am  Pt  states that she received a request for jury duty for federal court in [place] on Friday  Pt  states that she canot be in the car for an hour, and is wondering if Dr Jonas Gamez can write her an excuse for her abscence  Pt  states that she was seeing someone else for pain before seeing Dr Jonas Gamez  Pt  requesting c/b at [phone] to discuss  Keron Ibanez - 25 Jan 2016 2:06 PM     TASK EDITED  Spoke with pt who states that she has not beens ummoned for court as of yet, but has been given an application for her to be considered to sit on a federal case for jury duty in [facility]  Pt states that she has difficulty sitting for long period of time and has to get up every 15 min and move around in order to decrease her back pain  Pt wpndering if she can receive a note to be excused from jury duty that she can send in with her applicantion stating that she has back pain and is unable to sit for extended periods of time  Pt states she has 10 day to respond to application  Please advise   Ilia James - 25 Jan 2016 2:21 PM     TASK REASSIGNED: Previously Assigned To Ilia James Daryl - 25 Jan 2016 3:03 PM     TASK REPLIED TO: Previously Assigned To [address] [place] [address] clinical,Team  Can you please call her and tell that I have her jury duty excuse letter avaialable for  at the  in [address] [place] [address]  Keron Ibanez - 25 Jan 2016 4:37 PM     TASK EDITED  Linda Orona with pt and informed her of above  Pt appreciate and says thank you     Ilia James - 26 Jan 2016 9:46 AM     TASK REPLIED TO: Previously Assigned To Ilia James  aware        Active Problems    1  Chronic left hip pain (292 73,190 65) (M25 552,G89 29)   2  Chronic low back pain (724 2,338 29) (M54 5,G89 29)   3  Chronic myofascial pain (729 1,338 29) (M79 1,G89 29)   4  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

## 2018-01-17 NOTE — MISCELLANEOUS
Message  Pre operative call day prior surgery scheduled in the AM w/ DR Maya Espinal the following information is confirmed / discussed: Allergies Reviewed none  Hold medications reviewed: none  NPO after MN, night prior surgery reviewed:yes  Medication (s) instructed by healthcare provider to take the morning of surgery w/ sip of water 4 OZ discussed: Gabapentin   Post operative scripts electronic transmission: Keflex  PDMP site reviewed accessed and reviewed scheduled drug list printed and scanned into record--yes  Pain management script:Reports intolerance to Percocet make head feel "weird ", is nonspecific , prefers tramadol----script for tramadol  Pre- operative shower protocol reviewed; Clarify instructions as per protocol, third chlorhexidine shower tonight before surgery, then use BRIDGETTE wipes as per packaging instructions, Use a clean towel and wash cloth starting tonight and continue nightly until seen 2 weeks post operative visit for staple removal  Change bed linens tonight and continue at least 1-2 times weekly  Smoking cessation discussed N/A  Informed will receive a telephone call tonight from a hospital representative with time to report on surgery day: arrive at 1030  Informed will receive a f/u call within in 24 -48 hours post op for DR Maya Espinal NP to see how you are recovering, provide additional instructions, and to answer any questions  PAT packet review complete per protocol: Follow-up appointments reviewed   Patient verbalized understanding information provided /discussed  Plan  Acute post-operative pain    · TraMADol HCl - 50 MG Oral Tablet;  Take one tablet every six hours as needed for  pain  Status post surgery    · Cephalexin 500 MG Oral Capsule; TAKE 1 CAPSULE EVERY 6 HOURS DAILY    Signatures   Electronically signed by : JANKI Acosta; Oct  5 2017  5:08PM EST                       (Author)

## 2018-01-18 NOTE — MISCELLANEOUS
Message   Recorded as Task   Date: 07/17/2017 04:00 PM, Created By: Leonor Her   Task Name: Miscellaneous   Assigned To: [facility],Team   Regarding Patient: Gracie Sullivan, Status: In Progress   Comment:    Maki Gallardo - 17 Jul 2017 4:00 PM     TASK CREATED  pt was just seen by Dr Princess Treviño and stopped by  to schedule a procedure at Dr Jimmie Gómez recommendation  Can you review his office note and advised what procedure and I will call pt  Ilia James - 18 Jul 2017 7:25 AM     TASK REPLIED TO: Previously Assigned To Ilia James  trigger points in office   Maki Gallardo - 18 Jul 2017 10:32 AM     TASK REASSIGNED: Previously Assigned To Maki Gallardo  please schedule per task   Marietta Henderson - 19 Jul 2017 9:55 AM     TASK IN PROGRESS   Marietta Henderson - 19 Jul 2017 10:00 AM     TASK EDITED  Pt is scheduled for 8/8/17 @ 3:00        Active Problems    1  Chronic left hip pain (965 14,698 31) (M25 552,G89 29)   2  Chronic low back pain (724 2,338 29) (M54 5,G89 29)   3  Chronic myofascial pain (729 1,338 29) (M79 1,G89 29)   4  Encounter for staple removal (V58 32) (Z48 02)   5  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)   6  Lumbar radiculopathy, chronic (724 4) (M54 16)   7  Pain syndrome, chronic (338 4) (G89 4)   8  Postop check (V67 00) (Z09)   9  Pre-procedural examination (V72 84) (Z01 818)   10  Pre-procedure lab exam (V72 63) (Z01 812)   11  Spondylosis of lumbar region without myelopathy or radiculopathy (721 3) (M47 816)   12  Status post surgery (V45 89) (Z98 890)   13  Thoracic radiculitis (724 4) (M54 14)   14  Transverse 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) (G37 3)    Current Meds   1  Estradiol 0 1 MG/24HR Transdermal Patch Weekly; Therapy: (Recorded:51Ihm6574) to Recorded   2  Gabapentin 300 MG Oral Capsule Recorded   3  Methocarbamol 750 MG Oral Tablet; TAKE 1 TABLET Every 6 hours PRN For muscle   spasms;    Therapy: 57CME1466 to (iRch Ramos)  Requested for: 78KJC7876; Last   Rx:24Jkg7490 Ordered   4  Simvastatin 20 MG Oral Tablet Recorded   5  Zoloft 50 MG Oral Tablet (Sertraline HCl) Recorded    Allergies    1  Demerol TABS   2   TEGretol TABS    Signatures   Electronically signed by : Destiny Kendrick, ; Jul 19 2017 10:00AM EST                       (Author)

## 2018-01-18 NOTE — MISCELLANEOUS
Message   Recorded as Task   Date: 07/14/2017 02:35 PM, Created By: Amber Mora   Task Name: Miscellaneous   Assigned To: [facility] [place] [facility],Team   Regarding Patient: Sonja Marie, Status: Active   Comment:    Sudha Valenzuela - 14 Jul 2017 2:35 PM     TASK CREATED  pt was in to see Dr James today and he wanted her to see Dr Wagner Raw  she has an appt on monday and wanted Dr James to know   Aracelis Kilpatrick - 14 Jul 2017 2:38 PM     TASK REASSIGNED: Previously Assigned To [facility] [place] [facility],Team  ****FYI****   Ilia James - 14 Jul 2017 2:52 PM     TASK REPLIED TO: Previously Assigned To Ilia James  thanks! 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)   6  Lumbar radiculopathy, chronic (724 4) (M54 16)   7  Pain syndrome, chronic (338 4) (G89 4)   8  Postop check (V67 00) (Z09)   9  Pre-procedural examination (V72 84) (Z01 818)   10  Pre-procedure lab exam (V72 63) (Z01 812)   11  Spondylosis of lumbar region without myelopathy or radiculopathy (721 3) (M47 816)   12  Status post surgery (V45 89) (Z98 890)   13  Thoracic radiculitis (724 4) (M54 14)   14  Transverse 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) (G37 3)    Current Meds   1  Estradiol 0 1 MG/24HR Transdermal Patch Weekly; Therapy: (Recorded:18Oct2016) to Recorded   2  Gabapentin 300 MG Oral Capsule Recorded   3  Methocarbamol 750 MG Oral Tablet; TAKE 1 TABLET Every 6 hours PRN For muscle   spasms; Therapy: 81SKT0809 to (Evaluate:03Mar2017)  Requested for: 50Dfj3985; Last   Rx:62Jyn7123 Ordered   4  PredniSONE 10 MG Oral Tablet; Take as directed according to info sheet given at office; Therapy: 79JJR6750 to (Evaluate:06Jun2017)  Requested for: 51XZL0113; Last   Rx:33Ont5905 Ordered   5   Simvastatin 20 MG Oral Tablet Recorded   6  Zoloft 50 MG Oral Tablet (Sertraline HCl) Recorded    Allergies    1  Demerol TABS   2   TEGretol TABS    Signatures   Electronically signed by : Ellie Cisneros, ; Jul 14 2017  3:01PM EST                       (Author)

## 2018-01-18 NOTE — MISCELLANEOUS
Message   Recorded as Task   Date: 10/28/2016 01:18 PM, Created By: System   Task Name: Schedule Appointment   Assigned To: [facility],Team   Regarding Patient: Leyda Muro, Status: In Progress   Comment:    System - 28 Oct 2016 1:18 PM     Preferred Communication: Mail  Ordering Site: [facility]    Referred To: Sarahy Byers MD, Eren Canas  Neurosurgery  To Be Done: 28 Oct 2016   Rigoberto Ramsay - 31 Oct 2016 4:25 PM     TASK REASSIGNED: Previously Assigned To [address] [place] [address] ([birth year]), successful trial from Dr Valentino Shiner  Her number is [phone]  She is really worried about getting in because she has met her deductible and would really like to have her surgery prior to 12/31/16  Anything you could do would be helpful  Thank you  Janessa Perkins - 01 Nov 2016 11:18 AM     TASK EDITED  CALLED PT AND LMOM FOR HER TO CB  [facility]  Janessa Perkins - 01 Nov 2016 1:50 PM     TASK EDITED  SCHEDULED PT WITH DR Adrien Martinez 11/21/16 @ 2PM IN [facility] OFFICE  MAILED NPP  Active Problems    1  Chronic left hip pain (748 51,433 55) (M25 552,G89 29)   2  Chronic low back pain (724 2,338 29) (M54 5,G89 29)   3  Chronic myofascial pain (729 1,338 29) (M79 1,G89 29)   4  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)   5  Pain syndrome, chronic (338 4) (G89 4)   6  Pre-procedural examination (V72 84) (Z01 818)   7  Pre-procedure lab exam (V72 63) (Z01 812)   8  Spondylosis of lumbar region without myelopathy or radiculopathy (721 3) (M47 816)   9  Status post surgery (V45 89) (Z98 890)   10  Transverse 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) (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 3)    Current Meds   1  Cephalexin 500 MG Oral Capsule (Keflex); TAKE 1 CAPSULE 4 TIMES DAILY UNTIL   GONE;   Therapy: 15FBZ3238 to (Evaluate:10Oct2016); Last Rx:03Oct2016 Ordered   2   Cephalexin 500 MG Oral Capsule; TAKE 1 CAPSULE 4 TIMES DAILY UNTIL GONE;   Therapy: 25FHH1856 to (Joan Ohm)  Requested for: 85VVU8861; Last   Jenness Stains Ordered   3  Estradiol 0 1 MG/24HR Transdermal Patch Weekly; Therapy: (Recorded:18Oct2016) to Recorded   4  Gabapentin 300 MG Oral Capsule Recorded   5  Simvastatin 20 MG Oral Tablet Recorded   6  Zoloft 50 MG Oral Tablet (Sertraline HCl) Recorded    Allergies    1  Demerol TABS   2  TEGretol TABS    Signatures   Electronically signed by :  Lukasz Chi, ; Nov  3 2016  1:23PM EST                       (Author)

## 2018-01-22 VITALS
DIASTOLIC BLOOD PRESSURE: 66 MMHG | SYSTOLIC BLOOD PRESSURE: 118 MMHG | RESPIRATION RATE: 16 BRPM | HEIGHT: 64 IN | BODY MASS INDEX: 21.34 KG/M2 | WEIGHT: 125 LBS | HEART RATE: 77 BPM | TEMPERATURE: 97.5 F

## 2018-01-22 VITALS
HEART RATE: 77 BPM | RESPIRATION RATE: 16 BRPM | WEIGHT: 126 LBS | HEIGHT: 64 IN | DIASTOLIC BLOOD PRESSURE: 62 MMHG | BODY MASS INDEX: 21.51 KG/M2 | SYSTOLIC BLOOD PRESSURE: 117 MMHG | TEMPERATURE: 98.7 F

## 2018-01-22 VITALS
HEIGHT: 64 IN | WEIGHT: 126 LBS | BODY MASS INDEX: 21.51 KG/M2 | HEART RATE: 93 BPM | RESPIRATION RATE: 14 BRPM | TEMPERATURE: 98.1 F | DIASTOLIC BLOOD PRESSURE: 62 MMHG | SYSTOLIC BLOOD PRESSURE: 103 MMHG

## 2018-01-23 NOTE — MISCELLANEOUS
Message   Recorded as Task   Date: 12/15/2017 10:59 AM, Created By: Tory Ragland   Task Name: Miscellaneous   Assigned To: [facility] [place] [facility],Team   Regarding Patient: Joanna Morgan, Status: Active   Comment:    Tory Ragland - 15 Dec 2017 10:59 AM     TASK CREATED  phone call from patient requesting a medication refill of lidocaine patch  patient states she has three patches left, will last her till end of next week  patient uses the [facility] pharmacy in [place]  if any additional questions can reach patient at [phone]  CheikhSapna - 15 Dec 2017 11:08 AM     TASK EDITED   Jake Nguyen - 15 Dec 2017 11:50 AM     TASK REPLIED TO: Previously Assigned To Jake Nguyen  I escribed a script with 2 refills to her pharmacy  Thank you  Jasmine Poe - 15 Dec 2017 11:59 AM     TASK EDITED  S/w pt to advise on above  Pt verbalized understanding  Active Problems    1  Acute post-operative pain (338 18) (G89 18)   2  Chronic left hip pain (034 15,468 05) (M25 552,G89 29)   3  Chronic low back pain (724 2,338 29) (M54 5,G89 29)   4  Chronic myofascial pain (729 1,338 29) (M79 1,G89 29)   5  Encounter for removal of staples (V58 32) (Z48 02)   6  Encounter for staple removal (V58 32) (Z48 02)   7  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)   8  Lumbar radiculopathy, chronic (724 4) (M54 16)   9  Pain syndrome, chronic (338 4) (G89 4)   10  Postop check (V67 00) (Z09)   11  Preop examination (V72 84) (Z01 818)   12  Pre-procedural examination (V72 84) (Z01 818)   13  Pre-procedure lab exam (V72 63) (Z01 812)   14  Spondylosis of lumbar region without myelopathy or radiculopathy (721 3) (M47 816)   15  Status post surgery (V45 89) (Z98 890)   16  Thoracic radiculitis (724 4) (M54 14)   17  Transverse 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) (G37 3)    Current Meds   1  Estradiol 0 1 MG/24HR Transdermal Patch Weekly; APPLY 1 PATCH WEEKLY AS   DIRECTED; Therapy: (Recorded:20Nov2017) to Recorded   2   Gabapentin 300 MG Oral Capsule; TAKE 300 MG BID, 600 MG BID; Therapy: (Recorded:20Nov2017) to Recorded   3  Lidocaine 5 % External Patch; APPLY 1 PATCH TO THE AFFECTED AREA AND LEAVE   IN PLACE FOR 12 HOURS, THEN REMOVE AND LEAVE OFF FOR 12 HOURS; Therapy: 50Ies6228 to (Evaluate:13Jun2018)  Requested for: 09ETX0426; Last   Rx:79Jfy0983 Ordered   4  Methocarbamol 750 MG Oral Tablet; TAKE 1 TABLET Every 6 hours PRN For muscle   spasms; Therapy: 60ZJO4355 to (Evaluate:03Mar2017)  Requested for: 99Spu6026; Last   Rx:72Uei5630 Ordered   5  Simvastatin 20 MG Oral Tablet; TAKE 1 TABLET DAILY; Therapy: (Recorded:20Nov2017) to Recorded   6  Zoloft 50 MG Oral Tablet (Sertraline HCl); TAKE 1 5 TABLET Bedtime; Therapy: (Recorded:32Mey8125) to Recorded    Allergies    1  Demerol TABS   2   TEGretol TABS    Signatures   Electronically signed by : Patricia Alatorre, ; Dec 15 2017 11:59AM EST                       (Author)

## 2020-03-17 ENCOUNTER — OFFICE VISIT (OUTPATIENT)
Dept: PAIN MEDICINE | Facility: CLINIC | Age: 60
End: 2020-03-17
Payer: COMMERCIAL

## 2020-03-17 VITALS
HEART RATE: 88 BPM | DIASTOLIC BLOOD PRESSURE: 70 MMHG | SYSTOLIC BLOOD PRESSURE: 120 MMHG | HEIGHT: 64 IN | BODY MASS INDEX: 21.34 KG/M2 | WEIGHT: 125 LBS

## 2020-03-17 DIAGNOSIS — M54.16 LUMBAR RADICULOPATHY: ICD-10-CM

## 2020-03-17 DIAGNOSIS — M51.26 LUMBAR DISC HERNIATION: Primary | ICD-10-CM

## 2020-03-17 PROCEDURE — 99214 OFFICE O/P EST MOD 30 MIN: CPT | Performed by: ANESTHESIOLOGY
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
## 2020-03-19 ENCOUNTER — HOSPITAL ENCOUNTER (OUTPATIENT)
Dept: RADIOLOGY | Facility: CLINIC | Age: 60
Discharge: HOME | End: 2020-03-19
Attending: ANESTHESIOLOGY
Payer: COMMERCIAL

## 2020-03-19 VITALS
DIASTOLIC BLOOD PRESSURE: 78 MMHG | TEMPERATURE: 98.1 F | HEART RATE: 79 BPM | SYSTOLIC BLOOD PRESSURE: 146 MMHG | OXYGEN SATURATION: 97 % | RESPIRATION RATE: 18 BRPM

## 2020-03-19 DIAGNOSIS — M51.26 LUMBAR DISC HERNIATION: ICD-10-CM

## 2020-03-19 DIAGNOSIS — M54.16 LUMBAR RADICULOPATHY: ICD-10-CM

## 2020-03-19 PROCEDURE — 62323 NJX INTERLAMINAR LMBR/SAC: CPT | Performed by: ANESTHESIOLOGY

## 2020-03-19 RX ORDER — LIDOCAINE HYDROCHLORIDE 10 MG/ML
5 INJECTION, SOLUTION EPIDURAL; INFILTRATION; INTRACAUDAL; PERINEURAL ONCE
Status: COMPLETED | OUTPATIENT
Start: 2020-03-19 | End: 2020-03-19

## 2020-03-19 RX ORDER — METHYLPREDNISOLONE ACETATE 80 MG/ML
160 INJECTION, SUSPENSION INTRA-ARTICULAR; INTRALESIONAL; INTRAMUSCULAR; PARENTERAL; SOFT TISSUE ONCE
Status: COMPLETED | OUTPATIENT
Start: 2020-03-19 | End: 2020-03-19

## 2020-03-19 RX ADMIN — LIDOCAINE HYDROCHLORIDE 3 ML: 10 INJECTION, SOLUTION EPIDURAL; INFILTRATION; INTRACAUDAL; PERINEURAL at 09:55

## 2020-03-19 RX ADMIN — METHYLPREDNISOLONE ACETATE 160 MG: 80 INJECTION, SUSPENSION INTRA-ARTICULAR; INTRALESIONAL; INTRAMUSCULAR; SOFT TISSUE at 09:59

## 2020-03-19 RX ADMIN — IOHEXOL 1 ML: 300 INJECTION, SOLUTION INTRAVENOUS at 09:58

## 2020-03-19 NOTE — DISCHARGE INSTRUCTIONS
Epidural Steroid Injection   WHAT YOU NEED TO KNOW:   An epidural steroid injection (JESSI) is a procedure to inject steroid medicine into the epidural space  The epidural space is between your spinal cord and vertebrae  Steroids reduce inflammation and fluid buildup in your spine that may be causing pain  You may be given pain medicine along with the steroids  ACTIVITY  · Do not drive or operate machinery today  · No strenuous activity today - bending, lifting, etc   · You may resume normal activites starting tomorrow - start slowly and as tolerated  · You may shower today, but no tub baths or hot tubs  · You may have numbness for several hours from the local anesthetic  Please use caution and common sense, especially with weight-bearing activities  CARE OF THE INJECTION SITE  · If you have soreness or pain, apply ice to the area today (20 minutes on/20 minutes off)  · Starting tomorrow, you may use warm, moist heat or ice if needed  · You may have an increase or change in your discomfort for 36-48 hours after your treatment  · Apply ice and continue with any pain medication you have been prescribed  · [facility] if you have any of the following: redness, drainage, swelling, headache, stiff neck or fever above 100°F     SPECIAL INSTRUCTIONS  · Our office will contact you in approximately 7 days for a progress report  MEDICATIONS  · Continue to take all routine medications  · Our office may have instructed you to hold some medications  If you have a problem specifically related to your procedure, please call our office at [phone]  Problems not related to your procedure should be directed to your primary care physician
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
## 2020-03-26 ENCOUNTER — TELEPHONE (OUTPATIENT)
Dept: PAIN MEDICINE | Facility: CLINIC | Age: 60
End: 2020-03-26

## 2020-04-07 ENCOUNTER — TELEMEDICINE (OUTPATIENT)
Dept: PAIN MEDICINE | Facility: CLINIC | Age: 60
End: 2020-04-07
Payer: COMMERCIAL

## 2020-04-07 ENCOUNTER — TELEPHONE (OUTPATIENT)
Dept: PAIN MEDICINE | Facility: CLINIC | Age: 60
End: 2020-04-07

## 2020-04-07 DIAGNOSIS — M54.16 LUMBAR RADICULOPATHY: ICD-10-CM

## 2020-04-07 DIAGNOSIS — M51.26 LUMBAR DISC HERNIATION: Primary | ICD-10-CM

## 2020-04-07 PROCEDURE — 99214 OFFICE O/P EST MOD 30 MIN: CPT | Performed by: ANESTHESIOLOGY

## 2020-04-07 RX ORDER — METHYLPREDNISOLONE 4 MG/1
TABLET ORAL
Qty: 21 TABLET | Refills: 0 | Status: SHIPPED | OUTPATIENT
Start: 2020-04-07 | End: 2020-06-02

## 2020-05-07 ENCOUNTER — TELEPHONE (OUTPATIENT)
Dept: PAIN MEDICINE | Facility: CLINIC | Age: 60
End: 2020-05-07

## 2020-05-07 ENCOUNTER — HOSPITAL ENCOUNTER (OUTPATIENT)
Dept: RADIOLOGY | Facility: CLINIC | Age: 60
Discharge: HOME | End: 2020-05-07
Attending: ANESTHESIOLOGY | Admitting: ANESTHESIOLOGY
Payer: COMMERCIAL

## 2020-05-07 VITALS
TEMPERATURE: 98.3 F | DIASTOLIC BLOOD PRESSURE: 71 MMHG | SYSTOLIC BLOOD PRESSURE: 119 MMHG | HEART RATE: 86 BPM | OXYGEN SATURATION: 95 % | RESPIRATION RATE: 16 BRPM

## 2020-05-07 DIAGNOSIS — M51.26 LUMBAR DISC HERNIATION: ICD-10-CM

## 2020-05-07 DIAGNOSIS — M54.16 LUMBAR RADICULOPATHY: ICD-10-CM

## 2020-05-07 PROCEDURE — 62323 NJX INTERLAMINAR LMBR/SAC: CPT | Performed by: ANESTHESIOLOGY

## 2020-05-07 RX ORDER — METAXALONE 800 MG/1
TABLET ORAL AS NEEDED
COMMUNITY
Start: 2013-03-20 | End: 2020-08-11

## 2020-05-07 RX ORDER — CYANOCOBALAMIN (VITAMIN B-12) 500 MCG
TABLET ORAL EVERY 24 HOURS
COMMUNITY
End: 2020-05-07

## 2020-05-07 RX ORDER — MELOXICAM 7.5 MG/1
TABLET ORAL
COMMUNITY
End: 2020-05-07

## 2020-05-07 RX ORDER — LIDOCAINE HYDROCHLORIDE 10 MG/ML
5 INJECTION, SOLUTION EPIDURAL; INFILTRATION; INTRACAUDAL; PERINEURAL ONCE
Status: COMPLETED | OUTPATIENT
Start: 2020-05-07 | End: 2020-05-07

## 2020-05-07 RX ORDER — ESTRADIOL 0.04 MG/D
PATCH TRANSDERMAL WEEKLY
COMMUNITY
End: 2021-04-14 | Stop reason: CLARIF

## 2020-05-07 RX ORDER — METHYLPREDNISOLONE ACETATE 80 MG/ML
80 INJECTION, SUSPENSION INTRA-ARTICULAR; INTRALESIONAL; INTRAMUSCULAR; PARENTERAL; SOFT TISSUE ONCE
Status: COMPLETED | OUTPATIENT
Start: 2020-05-07 | End: 2020-05-07

## 2020-05-07 RX ADMIN — IOHEXOL 1 ML: 300 INJECTION, SOLUTION INTRAVENOUS at 08:19

## 2020-05-07 RX ADMIN — METHYLPREDNISOLONE ACETATE 80 MG: 80 INJECTION, SUSPENSION INTRA-ARTICULAR; INTRALESIONAL; INTRAMUSCULAR; SOFT TISSUE at 08:20

## 2020-05-07 RX ADMIN — LIDOCAINE HYDROCHLORIDE 3 ML: 10 INJECTION, SOLUTION EPIDURAL; INFILTRATION; INTRACAUDAL; PERINEURAL at 08:19

## 2020-05-14 ENCOUNTER — TELEPHONE (OUTPATIENT)
Dept: PAIN MEDICINE | Facility: CLINIC | Age: 60
End: 2020-05-14

## 2020-06-02 ENCOUNTER — TELEMEDICINE (OUTPATIENT)
Dept: PAIN MEDICINE | Facility: CLINIC | Age: 60
End: 2020-06-02
Payer: COMMERCIAL

## 2020-06-02 ENCOUNTER — TELEPHONE (OUTPATIENT)
Dept: PAIN MEDICINE | Facility: CLINIC | Age: 60
End: 2020-06-02

## 2020-06-02 DIAGNOSIS — M47.816 LUMBAR SPONDYLOSIS: ICD-10-CM

## 2020-06-02 DIAGNOSIS — M54.16 LUMBAR RADICULOPATHY: ICD-10-CM

## 2020-06-02 DIAGNOSIS — M54.50 CHRONIC BILATERAL LOW BACK PAIN WITHOUT SCIATICA: Primary | ICD-10-CM

## 2020-06-02 DIAGNOSIS — M51.26 LUMBAR DISC HERNIATION: ICD-10-CM

## 2020-06-02 DIAGNOSIS — G89.29 CHRONIC BILATERAL LOW BACK PAIN WITHOUT SCIATICA: Primary | ICD-10-CM

## 2020-06-02 PROCEDURE — 99214 OFFICE O/P EST MOD 30 MIN: CPT | Performed by: NURSE PRACTITIONER

## 2020-06-08 ENCOUNTER — TELEPHONE (OUTPATIENT)
Dept: PAIN MEDICINE | Facility: CLINIC | Age: 60
End: 2020-06-08

## 2020-06-08 ENCOUNTER — HOSPITAL ENCOUNTER (OUTPATIENT)
Dept: MRI IMAGING | Facility: HOSPITAL | Age: 60
Discharge: HOME | End: 2020-06-08
Payer: COMMERCIAL

## 2020-06-08 DIAGNOSIS — G89.29 CHRONIC BILATERAL LOW BACK PAIN WITHOUT SCIATICA: ICD-10-CM

## 2020-06-08 DIAGNOSIS — M54.50 CHRONIC BILATERAL LOW BACK PAIN WITHOUT SCIATICA: ICD-10-CM

## 2020-06-08 DIAGNOSIS — M54.16 LUMBAR RADICULOPATHY: ICD-10-CM

## 2020-06-08 DIAGNOSIS — M47.816 LUMBAR SPONDYLOSIS: ICD-10-CM

## 2020-06-08 DIAGNOSIS — M54.16 LUMBAR RADICULOPATHY: Primary | ICD-10-CM

## 2020-06-08 DIAGNOSIS — M51.26 LUMBAR DISC HERNIATION: ICD-10-CM

## 2020-06-08 PROCEDURE — 72148 MRI LUMBAR SPINE W/O DYE: CPT

## 2020-06-09 NOTE — TELEPHONE ENCOUNTER
Can you please call the patient and advise her that her lumbar spine MRI showed mild to mod, but stable deg changes/OA  However, it did show a new small disc herniation to the left at the L4-5 level  I would recommend we proceed with a Left L4 & L5 TFESI with Dr Nikolai Villanueva, which is different than the last injection we did  I put in the order  She is to schedule a f/u OV with me 3-4 weeks after the injection to re-group  Thank you  Physical exam:  General: patient in no acute distress, resting comfortably  Head:  Atraumatic, Normocephalic  Eyes: EOMI, PERRLA, clear sclera  Neck: Supple, thyroid nontender, non enlarged  Cardio: S1/S2 +ve, regular rate and rhythm, no M/G/R  Resp: clear to ausculation bilaterally, no rales or wheezes  GI: abdomen soft, nontender, non distended, no guarding, BS +ve x 4  Ext: no significant pedal edema, AVF to L arm  Neuro: CN 2-12 intact, no significant motor or sensory deficits.  Skin: No rashes or lesions

## 2020-06-09 NOTE — TELEPHONE ENCOUNTER
Pt called in to say  she had her MRI and would like to talk about the results   Please be advise thank you        Please call patient back @ [phone]

## 2020-06-09 NOTE — TELEPHONE ENCOUNTER
Bayron pt, advised of above  Pt verbalized understanding and appreciation, will await a cb from [address] [place] [address] to schedule injection and fu ov w/ DG

## 2020-06-09 NOTE — TELEPHONE ENCOUNTER
Patient scheduled 7/2 for TFESI with Dr Joni Laws  F/U with Jake scheduled 7/27  reviewed all pre-procedural instructions including updated protocol/screening  Instructed pt to arrive at her appt time, no earlier  Pt to wear a mask  Pt will need a  but they must wait in the car  Reviewed pre-procedural instructions: NPO 1 hr prior, wear pants w/o metal, call back to r/s if become ill/abx  COVID screening done  Pt verbalized understanding

## 2020-07-02 ENCOUNTER — HOSPITAL ENCOUNTER (OUTPATIENT)
Dept: RADIOLOGY | Facility: CLINIC | Age: 60
Discharge: HOME | End: 2020-07-02
Attending: ANESTHESIOLOGY | Admitting: ANESTHESIOLOGY
Payer: COMMERCIAL

## 2020-07-02 VITALS
SYSTOLIC BLOOD PRESSURE: 121 MMHG | RESPIRATION RATE: 20 BRPM | HEART RATE: 84 BPM | OXYGEN SATURATION: 98 % | TEMPERATURE: 98.3 F | DIASTOLIC BLOOD PRESSURE: 68 MMHG

## 2020-07-02 DIAGNOSIS — M54.50 CHRONIC BILATERAL LOW BACK PAIN WITHOUT SCIATICA: ICD-10-CM

## 2020-07-02 DIAGNOSIS — G89.29 CHRONIC BILATERAL LOW BACK PAIN WITHOUT SCIATICA: ICD-10-CM

## 2020-07-02 DIAGNOSIS — M51.26 LUMBAR DISC HERNIATION: ICD-10-CM

## 2020-07-02 DIAGNOSIS — M54.16 LUMBAR RADICULOPATHY: ICD-10-CM

## 2020-07-02 PROCEDURE — 64484 NJX AA&/STRD TFRM EPI L/S EA: CPT | Performed by: ANESTHESIOLOGY

## 2020-07-02 PROCEDURE — 64483 NJX AA&/STRD TFRM EPI L/S 1: CPT | Performed by: ANESTHESIOLOGY

## 2020-07-02 RX ORDER — LIDOCAINE HYDROCHLORIDE 10 MG/ML
5 INJECTION, SOLUTION EPIDURAL; INFILTRATION; INTRACAUDAL; PERINEURAL ONCE
Status: COMPLETED | OUTPATIENT
Start: 2020-07-02 | End: 2020-07-02

## 2020-07-02 RX ORDER — PAPAVERINE HCL 150 MG
20 CAPSULE, EXTENDED RELEASE ORAL ONCE
Status: COMPLETED | OUTPATIENT
Start: 2020-07-02 | End: 2020-07-02

## 2020-07-02 RX ADMIN — DEXAMETHASONE SODIUM PHOSPHATE 20 MG: 10 INJECTION, SOLUTION INTRAMUSCULAR; INTRAVENOUS at 10:41

## 2020-07-02 RX ADMIN — LIDOCAINE HYDROCHLORIDE 3 ML: 10 INJECTION, SOLUTION EPIDURAL; INFILTRATION; INTRACAUDAL; PERINEURAL at 10:39

## 2020-07-02 RX ADMIN — LIDOCAINE HYDROCHLORIDE 2 ML: 20 INJECTION, SOLUTION EPIDURAL; INFILTRATION; INTRACAUDAL at 10:42

## 2020-07-02 RX ADMIN — IOHEXOL 1 ML: 300 INJECTION, SOLUTION INTRAVENOUS at 10:41

## 2020-07-02 NOTE — DISCHARGE INSTRUCTIONS
Epidural Steroid Injection   WHAT YOU NEED TO KNOW:   An epidural steroid injection (JESSI) is a procedure to inject steroid medicine into the epidural space  The epidural space is between your spinal cord and vertebrae  Steroids reduce inflammation and fluid buildup in your spine that may be causing pain  You may be given pain medicine along with the steroids  ACTIVITY  · Do not drive or operate machinery today  · No strenuous activity today - bending, lifting, etc   · You may resume normal activites starting tomorrow - start slowly and as tolerated  · You may shower today, but no tub baths or hot tubs  · You may have numbness for several hours from the local anesthetic  Please use caution and common sense, especially with weight-bearing activities  CARE OF THE INJECTION SITE  · If you have soreness or pain, apply ice to the area today (20 minutes on/20 minutes off)  · Starting tomorrow, you may use warm, moist heat or ice if needed  · You may have an increase or change in your discomfort for 36-48 hours after your treatment  · Apply ice and continue with any pain medication you have been prescribed  · [facility] if you have any of the following: redness, drainage, swelling, headache, stiff neck or fever above 100°F     SPECIAL INSTRUCTIONS  · Our office will contact you in approximately 7 days for a progress report  MEDICATIONS  · Continue to take all routine medications  · Our office may have instructed you to hold some medications  If you have a problem specifically related to your procedure, please call our office at [phone]  Problems not related to your procedure should be directed to your primary care physician
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
## 2020-07-09 ENCOUNTER — TELEPHONE (OUTPATIENT)
Dept: PAIN MEDICINE | Facility: CLINIC | Age: 60
End: 2020-07-09

## 2020-07-09 NOTE — TELEPHONE ENCOUNTER
Pt reports 15% improvement post inj   Pain level 7/10 (when walking)     S/p Lt L4 L5 TFESI 7/2 F/u 7/27

## 2020-07-27 ENCOUNTER — OFFICE VISIT (OUTPATIENT)
Dept: PAIN MEDICINE | Facility: CLINIC | Age: 60
End: 2020-07-27
Payer: COMMERCIAL

## 2020-07-27 VITALS
HEART RATE: 68 BPM | SYSTOLIC BLOOD PRESSURE: 120 MMHG | HEIGHT: 64 IN | DIASTOLIC BLOOD PRESSURE: 68 MMHG | TEMPERATURE: 98.5 F | BODY MASS INDEX: 21.17 KG/M2 | WEIGHT: 124 LBS

## 2020-07-27 DIAGNOSIS — G89.29 CHRONIC BILATERAL LOW BACK PAIN WITHOUT SCIATICA: Primary | ICD-10-CM

## 2020-07-27 DIAGNOSIS — M54.16 LUMBAR RADICULOPATHY: ICD-10-CM

## 2020-07-27 DIAGNOSIS — M47.816 LUMBAR SPONDYLOSIS: ICD-10-CM

## 2020-07-27 DIAGNOSIS — M54.50 CHRONIC BILATERAL LOW BACK PAIN WITHOUT SCIATICA: Primary | ICD-10-CM

## 2020-07-27 DIAGNOSIS — M51.26 LUMBAR DISC HERNIATION: ICD-10-CM

## 2020-07-27 PROCEDURE — 99213 OFFICE O/P EST LOW 20 MIN: CPT | Performed by: NURSE PRACTITIONER
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
## 2020-07-30 ENCOUNTER — OFFICE VISIT (OUTPATIENT)
Dept: NEUROSURGERY | Facility: CLINIC | Age: 60
End: 2020-07-30
Payer: COMMERCIAL

## 2020-07-30 VITALS
SYSTOLIC BLOOD PRESSURE: 106 MMHG | BODY MASS INDEX: 21.51 KG/M2 | RESPIRATION RATE: 16 BRPM | DIASTOLIC BLOOD PRESSURE: 73 MMHG | HEART RATE: 96 BPM | WEIGHT: 126 LBS | TEMPERATURE: 98.4 F | HEIGHT: 64 IN

## 2020-07-30 DIAGNOSIS — G89.29 CHRONIC BILATERAL LOW BACK PAIN WITHOUT SCIATICA: ICD-10-CM

## 2020-07-30 DIAGNOSIS — M54.50 CHRONIC BILATERAL LOW BACK PAIN WITHOUT SCIATICA: ICD-10-CM

## 2020-07-30 DIAGNOSIS — M47.816 LUMBAR SPONDYLOSIS: ICD-10-CM

## 2020-07-30 DIAGNOSIS — M54.16 LUMBAR RADICULOPATHY: ICD-10-CM

## 2020-07-30 DIAGNOSIS — G37.3 TRANSVERSE MYELITIS (HCC): Primary | ICD-10-CM

## 2020-07-30 DIAGNOSIS — M51.26 LUMBAR DISC HERNIATION: ICD-10-CM

## 2020-07-30 PROCEDURE — 99214 OFFICE O/P EST MOD 30 MIN: CPT | Performed by: PHYSICIAN ASSISTANT

## 2020-07-30 RX ORDER — CEFAZOLIN SODIUM 1 G/50ML
1000 SOLUTION INTRAVENOUS ONCE
Status: CANCELLED | OUTPATIENT
Start: 2020-07-30 | End: 2020-07-30

## 2020-07-30 RX ORDER — CHLORHEXIDINE GLUCONATE 0.12 MG/ML
15 RINSE ORAL ONCE
Status: CANCELLED | OUTPATIENT
Start: 2020-07-30 | End: 2020-07-30

## 2020-07-30 RX ORDER — DIPHENHYDRAMINE HCL 25 MG
25 TABLET ORAL AS NEEDED
COMMUNITY
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
## 2020-07-30 NOTE — ASSESSMENT & PLAN NOTE
Left sided HNP at L4-5 compressing the L5 nerve root  · Symptoms began in February 2020, s/p 3 JESSI and home exercises  · H/o transverse myelitis in 1999 (single occurrence)  · S/p thoracic SCS in 1/2017 and removal in 10/2017 by Dr Cristopher Espinoza    Imaging:  · MRI lumbar spine w/o, 6/8/2020: There is a new small left paramedian disc herniation at L4-5 distorting the left anterolateral aspect of the thecal sac with mass effect upon the L5 nerve  Correlate for left L5 radiculopathy  Plan:  · Patient will be scheduled for a Metrx left L4-5 microdiskectomy with Dr Andrea Hernandez  · Our surgery scheduler will call her with details regarding pre-op testing and scheduling  · Soap, wipes, and medication list given to patient today in the office  · All risks and benefits were explained to the patient today by Dr Andrea Hernandez, including bleeding, infection, CSF leak, recurrence, and failure to cure   The risk of a flair up of transverse myelitis was also discussed  · Patient will need intra-op steroids and be placed on a steroid taper post op  · All questions were answered; consent was signed in the office
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
## 2020-08-05 ENCOUNTER — APPOINTMENT (OUTPATIENT)
Dept: LAB | Facility: HOSPITAL | Age: 60
End: 2020-08-05
Attending: NEUROLOGICAL SURGERY
Payer: COMMERCIAL

## 2020-08-05 ENCOUNTER — HOSPITAL ENCOUNTER (OUTPATIENT)
Dept: NON INVASIVE DIAGNOSTICS | Facility: HOSPITAL | Age: 60
Discharge: HOME | End: 2020-08-05
Attending: NEUROLOGICAL SURGERY
Payer: COMMERCIAL

## 2020-08-05 DIAGNOSIS — M51.26 LUMBAR DISC HERNIATION: ICD-10-CM

## 2020-08-05 DIAGNOSIS — G89.29 CHRONIC BILATERAL LOW BACK PAIN WITHOUT SCIATICA: ICD-10-CM

## 2020-08-05 DIAGNOSIS — M47.816 LUMBAR SPONDYLOSIS: ICD-10-CM

## 2020-08-05 DIAGNOSIS — G37.3 TRANSVERSE MYELITIS (HCC): ICD-10-CM

## 2020-08-05 DIAGNOSIS — M54.50 CHRONIC BILATERAL LOW BACK PAIN WITHOUT SCIATICA: ICD-10-CM

## 2020-08-05 DIAGNOSIS — M54.16 LUMBAR RADICULOPATHY: ICD-10-CM

## 2020-08-05 LAB
ALBUMIN SERPL BCP-MCNC: 4 G/DL (ref 3.5–5)
ALP SERPL-CCNC: 57 U/L (ref 46–116)
ALT SERPL W P-5'-P-CCNC: 31 U/L (ref 12–78)
ANION GAP SERPL CALCULATED.3IONS-SCNC: 3 MMOL/L (ref 4–13)
APTT PPP: 32 SECONDS (ref 23–37)
AST SERPL W P-5'-P-CCNC: 25 U/L (ref 5–45)
ATRIAL RATE: 75 BPM
BACTERIA UR QL AUTO: ABNORMAL /HPF
BASOPHILS # BLD AUTO: 0.03 THOUSANDS/ΜL (ref 0–0.1)
BASOPHILS NFR BLD AUTO: 1 % (ref 0–1)
BILIRUB SERPL-MCNC: 0.6 MG/DL (ref 0.2–1)
BILIRUB UR QL STRIP: NEGATIVE
BUN SERPL-MCNC: 9 MG/DL (ref 5–25)
CALCIUM SERPL-MCNC: 9.5 MG/DL (ref 8.3–10.1)
CHLORIDE SERPL-SCNC: 104 MMOL/L (ref 100–108)
CLARITY UR: ABNORMAL
CO2 SERPL-SCNC: 33 MMOL/L (ref 21–32)
COLOR UR: YELLOW
CREAT SERPL-MCNC: 0.87 MG/DL (ref 0.6–1.3)
EOSINOPHIL # BLD AUTO: 0.14 THOUSAND/ΜL (ref 0–0.61)
EOSINOPHIL NFR BLD AUTO: 3 % (ref 0–6)
ERYTHROCYTE [DISTWIDTH] IN BLOOD BY AUTOMATED COUNT: 11.9 % (ref 11.6–15.1)
EST. AVERAGE GLUCOSE BLD GHB EST-MCNC: 105 MG/DL
GFR SERPL CREATININE-BSD FRML MDRD: 72 ML/MIN/1.73SQ M
GLUCOSE P FAST SERPL-MCNC: 93 MG/DL (ref 65–99)
GLUCOSE UR STRIP-MCNC: NEGATIVE MG/DL
HBA1C MFR BLD HPLC: 5.3 %
HBA1C MFR BLD: 5.3 %
HCT VFR BLD AUTO: 39.5 % (ref 34.8–46.1)
HGB BLD-MCNC: 13.2 G/DL (ref 11.5–15.4)
HGB UR QL STRIP.AUTO: NEGATIVE
IMM GRANULOCYTES # BLD AUTO: 0 THOUSAND/UL (ref 0–0.2)
IMM GRANULOCYTES NFR BLD AUTO: 0 % (ref 0–2)
INR PPP: 1.01 (ref 0.84–1.19)
KETONES UR STRIP-MCNC: NEGATIVE MG/DL
LEUKOCYTE ESTERASE UR QL STRIP: ABNORMAL
LYMPHOCYTES # BLD AUTO: 2.08 THOUSANDS/ΜL (ref 0.6–4.47)
LYMPHOCYTES NFR BLD AUTO: 51 % (ref 14–44)
MCH RBC QN AUTO: 31.1 PG (ref 26.8–34.3)
MCHC RBC AUTO-ENTMCNC: 33.4 G/DL (ref 31.4–37.4)
MCV RBC AUTO: 93 FL (ref 82–98)
MONOCYTES # BLD AUTO: 0.36 THOUSAND/ΜL (ref 0.17–1.22)
MONOCYTES NFR BLD AUTO: 9 % (ref 4–12)
NEUTROPHILS # BLD AUTO: 1.45 THOUSANDS/ΜL (ref 1.85–7.62)
NEUTS SEG NFR BLD AUTO: 36 % (ref 43–75)
NITRITE UR QL STRIP: NEGATIVE
NON-SQ EPI CELLS URNS QL MICRO: ABNORMAL /HPF
NRBC BLD AUTO-RTO: 0 /100 WBCS
OTHER STN SPEC: ABNORMAL
P AXIS: 11 DEGREES
PH UR STRIP.AUTO: 6 [PH]
PLATELET # BLD AUTO: 272 THOUSANDS/UL (ref 149–390)
PMV BLD AUTO: 9.5 FL (ref 8.9–12.7)
POTASSIUM SERPL-SCNC: 4.2 MMOL/L (ref 3.5–5.3)
PR INTERVAL: 114 MS
PROT SERPL-MCNC: 7.4 G/DL (ref 6.4–8.2)
PROT UR STRIP-MCNC: NEGATIVE MG/DL
PROTHROMBIN TIME: 13.2 SECONDS (ref 11.6–14.5)
QRS AXIS: 80 DEGREES
QRSD INTERVAL: 80 MS
QT INTERVAL: 376 MS
QTC INTERVAL: 419 MS
RBC # BLD AUTO: 4.25 MILLION/UL (ref 3.81–5.12)
RBC #/AREA URNS AUTO: ABNORMAL /HPF
SODIUM SERPL-SCNC: 140 MMOL/L (ref 136–145)
SP GR UR STRIP.AUTO: 1.02 (ref 1–1.03)
T WAVE AXIS: 51 DEGREES
UROBILINOGEN UR QL STRIP.AUTO: 0.2 E.U./DL
VENTRICULAR RATE: 75 BPM
WBC # BLD AUTO: 4.06 THOUSAND/UL (ref 4.31–10.16)
WBC #/AREA URNS AUTO: ABNORMAL /HPF

## 2020-08-05 PROCEDURE — 93005 ELECTROCARDIOGRAM TRACING: CPT

## 2020-08-05 PROCEDURE — 81001 URINALYSIS AUTO W/SCOPE: CPT | Performed by: NEUROLOGICAL SURGERY

## 2020-08-05 PROCEDURE — 85025 COMPLETE CBC W/AUTO DIFF WBC: CPT

## 2020-08-05 PROCEDURE — 83036 HEMOGLOBIN GLYCOSYLATED A1C: CPT

## 2020-08-05 PROCEDURE — 85610 PROTHROMBIN TIME: CPT

## 2020-08-05 PROCEDURE — 36415 COLL VENOUS BLD VENIPUNCTURE: CPT

## 2020-08-05 PROCEDURE — 80053 COMPREHEN METABOLIC PANEL: CPT

## 2020-08-05 PROCEDURE — 85730 THROMBOPLASTIN TIME PARTIAL: CPT

## 2020-08-05 PROCEDURE — 93010 ELECTROCARDIOGRAM REPORT: CPT | Performed by: INTERNAL MEDICINE

## 2020-08-11 NOTE — PRE-PROCEDURE INSTRUCTIONS
Pre-Surgery Instructions:   Medication Instructions    Ascorbic Acid (VITAMIN C PO) Instructed patient per Anesthesia Guidelines   Cholecalciferol (VITAMIN D) 2000 UNITS tablet Instructed patient per Anesthesia Guidelines   diphenhydrAMINE (Benadryl Allergy) 25 mg tablet Instructed patient per Anesthesia Guidelines   estradiol (CLIMARA) 0 0375 MG/24HR Instructed patient per Anesthesia Guidelines   fluticasone (FLONASE) 50 mcg/act nasal spray Instructed patient per Anesthesia Guidelines   gabapentin (NEURONTIN) 300 mg capsule Instructed patient per Anesthesia Guidelines   multivitamin (THERAGRAN) TABS Instructed patient per Anesthesia Guidelines   simvastatin (ZOCOR) 20 mg tablet Instructed patient per Anesthesia Guidelines   Triamcinolone Acetonide 55 MCG/ACT AERO Instructed patient per Anesthesia Guidelines  Spoke to pt  Medication list reviewed & instructed   As of 8/11 pt to stop MV, vit D, C  Instructed on tylenol only  Am DOS pt ok to take gabapentin with small sip of water   Will clarify estradiol patch with anesthesia   Showering instructions provided by surgeon office, reviewed @ time of call   Negative COVID screening   All instructions verbally understood by patient  All questions answered     8/12: spoke to pt and instructed on estradiol patch per anesthesia:   She can continue to use  If possible apply to an area away from her arm and upper back   Patient will be in the prone position with a hot air warming device placed on her upper back and shoulder and the warmth from the "Jaron hugger" could increase the rate of medication administration  Thanks  Irving Ann     From: Sally Griffin MD - To: Chet Seymour RN  2 hours ago    Antiepileptic Med Class     Continue to take this medication on your normal schedule  If this is an oral medication and you take it in the morning, then you may take this medicine with a sip of water    HRT Med Class     Continue to take this medication on your normal schedule  If this is an oral medication and you take it in the morning, then you may take this medicine with a sip of water  This medication may need to be discontinued for a least 4 weeks prior to your surgery if the surgical procedure is associated with a high risk of blod clots as in hip or knee replacement for example  Please consult with your Surgeon to discuss discontinuing this medication before your surgery  Statin Med Class     Continue to take this medication on your normal schedule  If this is an oral medication and you take it in the morning, then you may take this medicine with a sip of water  Vitamin Med Class     You may continue to take any vitamin that your surgeon has prescribed to you up to the day before surgery  If your surgeon has not specifically prescribed this vitamin or instructed you to continue then stop taking 7 days prior to surgery

## 2020-08-14 ENCOUNTER — DOCUMENTATION (OUTPATIENT)
Dept: NEUROSURGERY | Facility: CLINIC | Age: 60
End: 2020-08-14

## 2020-08-15 ENCOUNTER — ANESTHESIA EVENT (OUTPATIENT)
Dept: PERIOP | Facility: HOSPITAL | Age: 60
End: 2020-08-15
Payer: COMMERCIAL

## 2020-08-17 ENCOUNTER — HOSPITAL ENCOUNTER (OUTPATIENT)
Facility: HOSPITAL | Age: 60
Setting detail: OUTPATIENT SURGERY
Discharge: HOME | End: 2020-08-17
Attending: NEUROLOGICAL SURGERY | Admitting: NEUROLOGICAL SURGERY
Payer: COMMERCIAL

## 2020-08-17 ENCOUNTER — APPOINTMENT (OUTPATIENT)
Dept: RADIOLOGY | Facility: HOSPITAL | Age: 60
End: 2020-08-17
Payer: COMMERCIAL

## 2020-08-17 ENCOUNTER — ANESTHESIA (OUTPATIENT)
Dept: PERIOP | Facility: HOSPITAL | Age: 60
End: 2020-08-17
Payer: COMMERCIAL

## 2020-08-17 VITALS
TEMPERATURE: 95.5 F | BODY MASS INDEX: 21.68 KG/M2 | OXYGEN SATURATION: 100 % | HEIGHT: 64 IN | WEIGHT: 127 LBS | HEART RATE: 80 BPM | RESPIRATION RATE: 16 BRPM | DIASTOLIC BLOOD PRESSURE: 60 MMHG | SYSTOLIC BLOOD PRESSURE: 112 MMHG

## 2020-08-17 DIAGNOSIS — Z98.890 POST-OPERATIVE STATE: Primary | ICD-10-CM

## 2020-08-17 DIAGNOSIS — M54.16 LUMBAR RADICULOPATHY: ICD-10-CM

## 2020-08-17 DIAGNOSIS — G37.3 TRANSVERSE MYELITIS (HCC): ICD-10-CM

## 2020-08-17 DIAGNOSIS — M51.26 LUMBAR DISC HERNIATION: ICD-10-CM

## 2020-08-17 PROBLEM — E78.49 OTHER HYPERLIPIDEMIA: Status: ACTIVE | Noted: 2020-08-17

## 2020-08-17 PROBLEM — F32.A DEPRESSION: Status: ACTIVE | Noted: 2020-08-17

## 2020-08-17 PROBLEM — R11.2 PONV (POSTOPERATIVE NAUSEA AND VOMITING): Status: ACTIVE | Noted: 2020-08-17

## 2020-08-17 PROCEDURE — 63030 LAMOT DCMPRN NRV RT 1 LMBR: CPT | Performed by: NEUROLOGICAL SURGERY

## 2020-08-17 PROCEDURE — 72020 X-RAY EXAM OF SPINE 1 VIEW: CPT

## 2020-08-17 RX ORDER — LIDOCAINE HYDROCHLORIDE 10 MG/ML
INJECTION, SOLUTION EPIDURAL; INFILTRATION; INTRACAUDAL; PERINEURAL AS NEEDED
Status: DISCONTINUED | OUTPATIENT
Start: 2020-08-17 | End: 2020-08-17

## 2020-08-17 RX ORDER — LIDOCAINE HYDROCHLORIDE AND EPINEPHRINE 10; 10 MG/ML; UG/ML
INJECTION, SOLUTION INFILTRATION; PERINEURAL AS NEEDED
Status: DISCONTINUED | OUTPATIENT
Start: 2020-08-17 | End: 2020-08-17 | Stop reason: HOSPADM

## 2020-08-17 RX ORDER — MIDAZOLAM HYDROCHLORIDE 2 MG/2ML
INJECTION, SOLUTION INTRAMUSCULAR; INTRAVENOUS AS NEEDED
Status: DISCONTINUED | OUTPATIENT
Start: 2020-08-17 | End: 2020-08-17

## 2020-08-17 RX ORDER — EPHEDRINE SULFATE 50 MG/ML
INJECTION INTRAVENOUS AS NEEDED
Status: DISCONTINUED | OUTPATIENT
Start: 2020-08-17 | End: 2020-08-17

## 2020-08-17 RX ORDER — CHLORHEXIDINE GLUCONATE 0.12 MG/ML
15 RINSE ORAL ONCE
Status: COMPLETED | OUTPATIENT
Start: 2020-08-17 | End: 2020-08-17

## 2020-08-17 RX ORDER — METHYLPREDNISOLONE ACETATE 40 MG/ML
INJECTION, SUSPENSION INTRA-ARTICULAR; INTRALESIONAL; INTRAMUSCULAR; SOFT TISSUE AS NEEDED
Status: DISCONTINUED | OUTPATIENT
Start: 2020-08-17 | End: 2020-08-17 | Stop reason: HOSPADM

## 2020-08-17 RX ORDER — SODIUM CHLORIDE 9 MG/ML
INJECTION, SOLUTION INTRAVENOUS CONTINUOUS PRN
Status: DISCONTINUED | OUTPATIENT
Start: 2020-08-17 | End: 2020-08-17

## 2020-08-17 RX ORDER — BUPIVACAINE HYDROCHLORIDE AND EPINEPHRINE 5; 5 MG/ML; UG/ML
INJECTION, SOLUTION EPIDURAL; INTRACAUDAL; PERINEURAL AS NEEDED
Status: DISCONTINUED | OUTPATIENT
Start: 2020-08-17 | End: 2020-08-17 | Stop reason: HOSPADM

## 2020-08-17 RX ORDER — SODIUM CHLORIDE, SODIUM LACTATE, POTASSIUM CHLORIDE, CALCIUM CHLORIDE 600; 310; 30; 20 MG/100ML; MG/100ML; MG/100ML; MG/100ML
INJECTION, SOLUTION INTRAVENOUS CONTINUOUS PRN
Status: DISCONTINUED | OUTPATIENT
Start: 2020-08-17 | End: 2020-08-17

## 2020-08-17 RX ORDER — CEFAZOLIN SODIUM 1 G/50ML
1000 SOLUTION INTRAVENOUS ONCE
Status: COMPLETED | OUTPATIENT
Start: 2020-08-17 | End: 2020-08-17

## 2020-08-17 RX ORDER — KETOROLAC TROMETHAMINE 30 MG/ML
INJECTION, SOLUTION INTRAMUSCULAR; INTRAVENOUS AS NEEDED
Status: DISCONTINUED | OUTPATIENT
Start: 2020-08-17 | End: 2020-08-17

## 2020-08-17 RX ORDER — HYDROMORPHONE HCL/PF 1 MG/ML
0.4 SYRINGE (ML) INJECTION
Status: DISCONTINUED | OUTPATIENT
Start: 2020-08-17 | End: 2020-08-17 | Stop reason: HOSPADM

## 2020-08-17 RX ORDER — METHYLPREDNISOLONE 4 MG/1
TABLET ORAL
Qty: 1 EACH | Refills: 0 | Status: SHIPPED | OUTPATIENT
Start: 2020-08-17 | End: 2020-10-01

## 2020-08-17 RX ORDER — OXYCODONE HYDROCHLORIDE 5 MG/1
5 TABLET ORAL EVERY 6 HOURS PRN
Qty: 20 TABLET | Refills: 0 | Status: SHIPPED | OUTPATIENT
Start: 2020-08-17 | End: 2020-08-22

## 2020-08-17 RX ORDER — ONDANSETRON 2 MG/ML
INJECTION INTRAMUSCULAR; INTRAVENOUS AS NEEDED
Status: DISCONTINUED | OUTPATIENT
Start: 2020-08-17 | End: 2020-08-17

## 2020-08-17 RX ORDER — ONDANSETRON 4 MG/1
4 TABLET, ORALLY DISINTEGRATING ORAL EVERY 6 HOURS PRN
Status: DISCONTINUED | OUTPATIENT
Start: 2020-08-17 | End: 2020-08-17 | Stop reason: HOSPADM

## 2020-08-17 RX ORDER — ROCURONIUM BROMIDE 10 MG/ML
INJECTION, SOLUTION INTRAVENOUS AS NEEDED
Status: DISCONTINUED | OUTPATIENT
Start: 2020-08-17 | End: 2020-08-17

## 2020-08-17 RX ORDER — PROPOFOL 10 MG/ML
INJECTION, EMULSION INTRAVENOUS AS NEEDED
Status: DISCONTINUED | OUTPATIENT
Start: 2020-08-17 | End: 2020-08-17

## 2020-08-17 RX ORDER — ONDANSETRON 2 MG/ML
4 INJECTION INTRAMUSCULAR; INTRAVENOUS ONCE AS NEEDED
Status: DISCONTINUED | OUTPATIENT
Start: 2020-08-17 | End: 2020-08-17 | Stop reason: HOSPADM

## 2020-08-17 RX ORDER — PROPOFOL 10 MG/ML
INJECTION, EMULSION INTRAVENOUS CONTINUOUS PRN
Status: DISCONTINUED | OUTPATIENT
Start: 2020-08-17 | End: 2020-08-17

## 2020-08-17 RX ORDER — FENTANYL CITRATE 50 UG/ML
INJECTION, SOLUTION INTRAMUSCULAR; INTRAVENOUS AS NEEDED
Status: DISCONTINUED | OUTPATIENT
Start: 2020-08-17 | End: 2020-08-17

## 2020-08-17 RX ORDER — OXYCODONE HYDROCHLORIDE AND ACETAMINOPHEN 5; 325 MG/1; MG/1
1 TABLET ORAL EVERY 4 HOURS PRN
Status: DISCONTINUED | OUTPATIENT
Start: 2020-08-17 | End: 2020-08-17 | Stop reason: HOSPADM

## 2020-08-17 RX ADMIN — PROPOFOL 20 MG: 10 INJECTION, EMULSION INTRAVENOUS at 07:40

## 2020-08-17 RX ADMIN — PROPOFOL 120 MCG/KG/MIN: 10 INJECTION, EMULSION INTRAVENOUS at 07:56

## 2020-08-17 RX ADMIN — ROCURONIUM BROMIDE 80 MG: 10 INJECTION, SOLUTION INTRAVENOUS at 07:37

## 2020-08-17 RX ADMIN — REMIFENTANIL HYDROCHLORIDE 0.2 MCG/KG/MIN: 1 INJECTION, POWDER, LYOPHILIZED, FOR SOLUTION INTRAVENOUS at 07:56

## 2020-08-17 RX ADMIN — SODIUM CHLORIDE: 9 INJECTION, SOLUTION INTRAVENOUS at 08:04

## 2020-08-17 RX ADMIN — CHLORHEXIDINE GLUCONATE 0.12% ORAL RINSE 15 ML: 1.2 LIQUID ORAL at 06:47

## 2020-08-17 RX ADMIN — PHENYLEPHRINE HYDROCHLORIDE 100 MCG: 10 INJECTION INTRAVENOUS at 07:49

## 2020-08-17 RX ADMIN — PHENYLEPHRINE HYDROCHLORIDE 100 MCG: 10 INJECTION INTRAVENOUS at 07:55

## 2020-08-17 RX ADMIN — HYDROCORTISONE SODIUM SUCCINATE 50 MG: 100 INJECTION, POWDER, FOR SOLUTION INTRAMUSCULAR; INTRAVENOUS at 08:00

## 2020-08-17 RX ADMIN — MIDAZOLAM 2 MG: 1 INJECTION INTRAMUSCULAR; INTRAVENOUS at 07:28

## 2020-08-17 RX ADMIN — SUGAMMADEX 200 MG: 100 INJECTION, SOLUTION INTRAVENOUS at 08:39

## 2020-08-17 RX ADMIN — PHENYLEPHRINE HYDROCHLORIDE 100 MCG: 10 INJECTION INTRAVENOUS at 07:43

## 2020-08-17 RX ADMIN — PROPOFOL 25 MG: 10 INJECTION, EMULSION INTRAVENOUS at 07:45

## 2020-08-17 RX ADMIN — HYDROCORTISONE SODIUM SUCCINATE 50 MG: 100 INJECTION, POWDER, FOR SOLUTION INTRAMUSCULAR; INTRAVENOUS at 08:25

## 2020-08-17 RX ADMIN — CEFAZOLIN SODIUM 1000 MG: 1 SOLUTION INTRAVENOUS at 08:00

## 2020-08-17 RX ADMIN — PHENYLEPHRINE HYDROCHLORIDE 100 MCG: 10 INJECTION INTRAVENOUS at 07:41

## 2020-08-17 RX ADMIN — KETOROLAC TROMETHAMINE 30 MG: 30 INJECTION, SOLUTION INTRAMUSCULAR at 08:44

## 2020-08-17 RX ADMIN — PROPOFOL 130 MG: 10 INJECTION, EMULSION INTRAVENOUS at 07:37

## 2020-08-17 RX ADMIN — PHENYLEPHRINE HYDROCHLORIDE 100 MCG: 10 INJECTION INTRAVENOUS at 07:37

## 2020-08-17 RX ADMIN — EPHEDRINE SULFATE 10 MG: 50 INJECTION, SOLUTION INTRAVENOUS at 08:51

## 2020-08-17 RX ADMIN — PROPOFOL 25 MG: 10 INJECTION, EMULSION INTRAVENOUS at 07:51

## 2020-08-17 RX ADMIN — ONDANSETRON 4 MG: 2 INJECTION INTRAMUSCULAR; INTRAVENOUS at 08:33

## 2020-08-17 RX ADMIN — EPHEDRINE SULFATE 10 MG: 50 INJECTION, SOLUTION INTRAVENOUS at 08:47

## 2020-08-17 RX ADMIN — FENTANYL CITRATE 100 MCG: 50 INJECTION, SOLUTION INTRAMUSCULAR; INTRAVENOUS at 07:37

## 2020-08-17 RX ADMIN — EPHEDRINE SULFATE 10 MG: 50 INJECTION, SOLUTION INTRAVENOUS at 07:58

## 2020-08-17 RX ADMIN — SODIUM CHLORIDE, SODIUM LACTATE, POTASSIUM CHLORIDE, AND CALCIUM CHLORIDE: .6; .31; .03; .02 INJECTION, SOLUTION INTRAVENOUS at 07:50

## 2020-08-17 RX ADMIN — PHENYLEPHRINE HYDROCHLORIDE 40 MCG/MIN: 10 INJECTION INTRAVENOUS at 07:56

## 2020-08-17 RX ADMIN — LIDOCAINE HYDROCHLORIDE 50 MG: 10 INJECTION, SOLUTION EPIDURAL; INFILTRATION; INTRACAUDAL; PERINEURAL at 07:37

## 2020-08-17 NOTE — ANESTHESIA POSTPROCEDURE EVALUATION
Post-Op Assessment Note    CV Status:  Stable  Pain Score: 3    Pain management: adequate     Mental Status:  Alert and awake   Hydration Status:  Euvolemic   PONV Controlled:  Controlled   Airway Patency:  Patent      Post Op Vitals Reviewed: Yes      Staff: Anesthesiologist

## 2020-08-17 NOTE — ANESTHESIA PREPROCEDURE EVALUATION
Procedure:  Metrx L4-5 left microdiscectomy (Left Spine Lumbar)    Relevant Problems   ANESTHESIA   (+) PONV (postoperative nausea and vomiting)      CARDIO   (+) Other hyperlipidemia      MUSCULOSKELETAL   (+) Chronic bilateral low back pain without sciatica   (+) Lumbar spondylosis      NEURO/PSYCH   (+) Depression   (+) Transverse myelitis (HCC)        Physical Exam    Airway    Mallampati score: II  TM Distance: >3 FB  Neck ROM: full     Dental   No notable dental hx     Cardiovascular  Rhythm: regular,     Pulmonary  Breath sounds clear to auscultation,     Other Findings        Anesthesia Plan  ASA Score- 2     Anesthesia Type- general with ASA Monitors  Additional Monitors:   Airway Plan: ETT  Plan Factors-    Chart reviewed  EKG reviewed  Patient is not a current smoker  Patient not instructed to abstain from smoking on day of procedure  Patient did not smoke on day of surgery  Induction- intravenous  Postoperative Plan- Plan for postoperative opioid use  Informed Consent- Anesthetic plan and risks discussed with patient  I personally reviewed this patient with the CRNA  Discussed and agreed on the Anesthesia Plan with the CRNA  Jr Yan

## 2020-08-17 NOTE — ANESTHESIA POSTPROCEDURE EVALUATION
Post-Op Assessment Note    CV Status:  Stable  Pain Score: 0    Pain management: adequate     Mental Status:  Alert and awake   Hydration Status:  Euvolemic   PONV Controlled:  Controlled   Airway Patency:  Patent      Post Op Vitals Reviewed: Yes      Staff: CRNA         No complications documented      BP   94/49   Temp  96 6   Pulse  74   Resp   126   SpO2   100%

## 2020-08-17 NOTE — OP NOTE
OPERATIVE REPORT  PATIENT NAME: Foster Vegas    :  1960  MRN: [id number]  Pt Location:  OR ROOM 17    SURGERY DATE: 2020    Surgeon(s) and Role:     * Mortimer Rush, MD - Primary    Preop Diagnosis:  Lumbar radiculopathy [M54 16]  Chronic bilateral low back pain without sciatica [M54 5, G89 29]  Lumbar spondylosis [M47 816]  Lumbar disc herniation [M51 26]    Post-Op Diagnosis Codes:     * Lumbar radiculopathy [M54 16]     * Chronic bilateral low back pain without sciatica [M54 5, G89 29]     * Lumbar spondylosis [M47 816]     * Lumbar disc herniation [M51 26]    Procedure(s) (LRB):  Metrx L4-5 left microdiscectomy (Left)    Specimen(s):  * No specimens in log *    Estimated Blood Loss:   Minimal    Drains:  * No LDAs found *    Anesthesia Type:   General    Operative Indications:  Lumbar radiculopathy [M54 16]  Chronic bilateral low back pain without sciatica [M54 5, G89 29]  Lumbar spondylosis [M47 816]  Lumbar disc herniation [M51 26]        Operative Findings:  Herniated disk subligamentous      Complications:   None    Procedure and Technique:  After adequate general endotracheal anesthesia the patient was placed prone on the operating room table  The back was prepped with Betadine soap and then DuraPrep double layer drapes placed normal fashion and 3M Betadine impregnated sticky drape was placed over these  A timeout was called all parameters the timeout were followed  The K wire was placed to the facet overlying the L4/5 interspace on the Left side  Intraoperative fluoroscopy was used throughout the case to either identification of level  There was radiology over-read for level verification  The skin was then infiltrated with 1% lidocaine with 1 100,000 epinephrine  A #15 blade was used to incise the skin  Bovie and bipolar were used throughout the procedure to maintain operative exposure   Intraoperative microscope was used at various degrees of magnification to aid in the Identification, illumination, and microdissection necessary to perform this procedure  Progressive dilators were placed, the sheath was placed and this was affixed to the bedside connector  The Midas Ramesh drill was utilized to perform a hemilaminotomy on the left  A moderate sized disc herniation was identified beneath the redundant ligament at the entrance to the neuroforamen  The disk space proper was not entered  There were no CSF leaks  40 mg of Depo-Medrol was placed a small piece of Gelfoam was placed over this  The retractors were removed  The fascia was approximated with interrupted 3-0 Vicryl suture  The skin was closed with interrupted inverted 3-0 Vicryl suture  0 5% bupivacaine with 1: 200,000 epinephrine was infiltrated into the surrounding tissues  Benzoin and Steri-Strips are placed and sterile dressing was placed    The patient was taken to the recovery room having tolerated procedure well   I was present for the entire procedure   I was present for the entire procedure    Patient Disposition:  PACU     SIGNATURE: Tegan Wall MD  DATE: August 17, 2020  TIME: 8:54 AM

## 2020-08-19 ENCOUNTER — TELEMEDICINE (OUTPATIENT)
Dept: NEUROSURGERY | Facility: CLINIC | Age: 60
End: 2020-08-19

## 2020-08-19 DIAGNOSIS — Z48.89 AFTERCARE FOLLOWING SURGERY FOR INJURY AND TRAUMA: Primary | ICD-10-CM

## 2020-08-19 PROCEDURE — 99024 POSTOP FOLLOW-UP VISIT: CPT
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
## 2020-08-31 ENCOUNTER — DOCUMENTATION (OUTPATIENT)
Dept: NEUROSURGERY | Facility: CLINIC | Age: 60
End: 2020-08-31

## 2020-08-31 ENCOUNTER — CLINICAL SUPPORT (OUTPATIENT)
Dept: NEUROSURGERY | Facility: CLINIC | Age: 60
End: 2020-08-31

## 2020-08-31 VITALS — TEMPERATURE: 98 F | DIASTOLIC BLOOD PRESSURE: 60 MMHG | SYSTOLIC BLOOD PRESSURE: 100 MMHG

## 2020-08-31 DIAGNOSIS — Z98.890 POST-OPERATIVE STATE: Primary | ICD-10-CM

## 2020-08-31 DIAGNOSIS — Z98.890 S/P LUMBAR DISCECTOMY: ICD-10-CM

## 2020-08-31 PROCEDURE — 99024 POSTOP FOLLOW-UP VISIT: CPT

## 2020-08-31 NOTE — PROGRESS NOTES
Post-Op Visit- Neurosurgery    Petey Smalls 56 y o  female MRN: [id number]    Chief Complaint:  Patient presents post: Metrx L4-5 left microdiscectomy (Left Spine Lumbar)     History of Present Illness:  Patient presents for 2 week POV for incision check  Arrived unaccompanied and ambulated well without assistive device  Reports pain is 0/10 and had not required pain medication since the surgery  She is very pleased with the results of the surgery so far and said she has had almost completed resolution of her presurgical symptoms  Current Outpatient Medications:     Ascorbic Acid (VITAMIN C PO), Take by mouth, Disp: , Rfl:     Cholecalciferol (VITAMIN D) 2000 UNITS tablet, Take 2,000 Units by mouth daily, Disp: , Rfl:     diphenhydrAMINE (Benadryl Allergy) 25 mg tablet, Take 25 mg by mouth as needed for itching, Disp: , Rfl:     estradiol (CLIMARA) 0 0375 MG/24HR, once a week qTuesdays, Disp: , Rfl:     fluticasone (FLONASE) 50 mcg/act nasal spray, 1 spray into each nostril daily, Disp: , Rfl:     gabapentin (NEURONTIN) 300 mg capsule, Take 300 mg by mouth 4 (four) times a day  , Disp: , Rfl:     multivitamin (THERAGRAN) TABS, Take 1 tablet by mouth daily, Disp: , Rfl:     simvastatin (ZOCOR) 20 mg tablet, Take 40 mg by mouth , Disp: , Rfl:     methylPREDNISolone 4 MG tablet therapy pack, Use as directed on package (Patient not taking: Reported on 8/31/2020), Disp: 1 each, Rfl: 0   Allergies   Allergen Reactions    Carbamazepine Hives     Tongue blisters  Other reaction(s): Flushing    Meperidine GI Intolerance, Dizziness and Nausea Only          Assessment:    Vitals:    08/31/20 0856   BP: 100/60   Temp: 98 °F (36 7 °C)       Wound Exam: Incision is clean, dry, and in tact, well approximated, without heat, redness, swelling, or drainage  See image below:           Procedure:  Surgical site assessment   Steri strip removal   Patient Status: the patient tolerated the procedure well  Left BLANCA  Complications: None  Discussion/Summary  Reviewed incision care with patient including daily observation for s/s infection including: increased erythema, edema, drainage, dehiscence of incision or fever >101  Should these be observed, she understands that she is to call and/or return immediately for reassessment  Advised patient to continue cleansing area with mild soap and water and pat dry  Not to apply any lotions, creams, or ointments, & not to submerge in any water for 4  more weeks  She is to maintain activity restrictions until cleared by the surgeon  Activity levels were also reviewed with the patient in detail, she is to lift no greater than 10 pounds and ambulation is encouraged as tolerated  Verified date/time/location of upcoming POV on 10/1/2020  She is to call the office with any further questions or concerns, or if any incisional issues or fevers would arise

## 2020-09-09 ENCOUNTER — EVALUATION (OUTPATIENT)
Dept: PHYSICAL THERAPY | Facility: CLINIC | Age: 60
End: 2020-09-09
Payer: COMMERCIAL

## 2020-09-09 DIAGNOSIS — Z98.890 S/P LUMBAR DISCECTOMY: Primary | ICD-10-CM

## 2020-09-09 PROCEDURE — 97162 PT EVAL MOD COMPLEX 30 MIN: CPT | Performed by: PHYSICAL THERAPIST

## 2020-09-09 PROCEDURE — 97110 THERAPEUTIC EXERCISES: CPT | Performed by: PHYSICAL THERAPIST

## 2020-09-09 NOTE — PROGRESS NOTES
PT Evaluation   10/8/20: Therapist spoke with pt on the phone  Pt is doing well and is able to continue with HEP independently  Pt is making good progress with functional activities  Today's date: 2020  Patient name: Curtis Lemus  : 1960  MRN: [id number]  Referring provider: Baltazar Muro MD  Dx:   Encounter Diagnosis     ICD-10-CM    1  S/P lumbar discectomy  [id number] Ambulatory referral to Physical Therapy                  Assessment  Assessment details: Pt is a 56y o  year old female coming to outpatient PT s/p L4-L5 microdiscectomy   Pt presents with decreased lumbar ROM, decreased L LE strength, mild LE tightness and overall decreased functional mobility  Pt would benefit from skilled PT services in order to address these deficits and reach maximum level of function  Thank you kindly for the referral!  Pt has a high insurance co pay and may not be able to attend therapy on a regular basis  Pt was instructed in a comprehensive initial HEP  Impairments: abnormal or restricted ROM, activity intolerance, impaired physical strength, lacks appropriate home exercise program and pain with function  Barriers to therapy: 1  High insurance co pay ( $85)  Understanding of Dx/Px/POC: good   Prognosis: good    Goals  STG's ( 3-4 weeks)  1  Pt will be independent in HEP  2  Pt will be able to perform abdominal stabilization while walking  LTG's ( 6- 8 weeks)  1  Improve FOTO score by 8-10 points  2  Pt will return to light household cleaning activities  3  Pt will be able to lift 10-15 lbs with no pain    Plan  Patient would benefit from: PT eval and skilled physical therapy  Planned therapy interventions: manual therapy, neuromuscular re-education, home exercise program, functional ROM exercises, flexibility, therapeutic exercise, therapeutic activities, stretching and strengthening  Frequency: 1 time every 2 weeks    Duration in weeks: 8  Treatment plan discussed with: patient        Subjective Evaluation    History of Present Illness  Date of surgery: 2020  Mechanism of injury: surgery  Mechanism of injury: Pt reports a history of chronic neck/ LBP and, transverse myelitis  2020, pt started to have LBP and L sciatic pain and she was not able to walk or get out of bed  Pt had L JESSI x3 with some relief  20 pt underwent L L4-L5 microdiscetcomy  Pt mild tingling in L LE after surgery and no longer has LBP  Pt is not able to bend, twist, or lift > 5-10 lbs, limited stairs, or pushing or pulling  Pt is taking short walks 4 times per day  Pt is sleeping well  Pt has increased thoracic pain when walking or standing in the kitchen to cook      Work: homemaker  Hobbies: babysits grand daughter 3 y o, gardening, cooking  Gait; no abnormalities  Pain  At best pain ratin  At worst pain ratin  Location: lumbar spine  Quality: needle-like    Social Support  Steps to enter house: yes  2  Stairs in house: yes   13  Lives in: multiple-level home    Employment status: not working  Treatments  Previous treatment: injection treatment  Patient Goals  Patient goal: to improve pelvis strength/ positioning; improve abdominal strength        Objective     Neurological Testing     Sensation     Lumbar   Left   Intact: light touch    Right   Intact: light touch    Reflexes   Left   Patellar (L4): normal (2+)  Achilles (S1): normal (2+)    Right   Patellar (L4): normal (2+)    Active Range of Motion     Lumbar   Flexion: 20 degrees   Extension: 5 degrees   Left lateral flexion: 10 degrees       Right lateral flexion: 10 degrees   Left rotation:  Restriction level: maximal  Right rotation:  Restriction level: maximal    Additional Active Range of Motion Details  Lumbar ROM: limited 2* physician restrictions s/p surgery  In standing: symmetrical iliac crest and PSIS levels  (-) SKTC  Mild piriformis tightness  HS flexibility: R: 80*  L: 72* with opposite knee flexed    Strength/Myotome Testing     Lumbar     Right Normal strength    Left Hip   Planes of Motion   Flexion: 4+  Abduction: 4+  External rotation: 4+  Internal rotation: 4+    Left Knee   Flexion: 4+  Extension: 5    Left Ankle/Foot   Dorsiflexion: 5      Flowsheet Rows      Most Recent Value   PT/OT G-Codes   Current Score  49   Projected Score  59            Dx: s/p L4-L5 microdiscetomy  EPOC: 11/4/20  CO-MORBIDITIES: transverse myelitis; h/o spinal stimulator implanted and removed  PERSONAL FACTORS:  Precautions: no twisting, bending, lifting > 5-10 lbs      Manuals 9/9                                                                Neuro Re-Ed                                                                                                        Ther Ex             HEP instruction 10'                                                                                                       Ther Activity                                       Gait Training                                       Modalities

## 2020-10-01 ENCOUNTER — OFFICE VISIT (OUTPATIENT)
Dept: NEUROSURGERY | Facility: CLINIC | Age: 60
End: 2020-10-01

## 2020-10-01 VITALS
TEMPERATURE: 97.3 F | HEIGHT: 64 IN | WEIGHT: 126 LBS | HEART RATE: 89 BPM | RESPIRATION RATE: 16 BRPM | BODY MASS INDEX: 21.51 KG/M2 | SYSTOLIC BLOOD PRESSURE: 100 MMHG | DIASTOLIC BLOOD PRESSURE: 79 MMHG

## 2020-10-01 DIAGNOSIS — Z09 POSTOPERATIVE EXAMINATION: Primary | ICD-10-CM

## 2020-10-01 PROCEDURE — 99024 POSTOP FOLLOW-UP VISIT: CPT | Performed by: NEUROLOGICAL SURGERY

## 2020-10-05 ENCOUNTER — TELEPHONE (OUTPATIENT)
Dept: PAIN MEDICINE | Facility: MEDICAL CENTER | Age: 60
End: 2020-10-05

## 2020-10-06 ENCOUNTER — OFFICE VISIT (OUTPATIENT)
Dept: PAIN MEDICINE | Facility: CLINIC | Age: 60
End: 2020-10-06
Payer: COMMERCIAL

## 2020-10-06 ENCOUNTER — HOSPITAL ENCOUNTER (OUTPATIENT)
Dept: NON INVASIVE DIAGNOSTICS | Facility: HOSPITAL | Age: 60
Discharge: HOME | End: 2020-10-06
Payer: COMMERCIAL

## 2020-10-06 VITALS
SYSTOLIC BLOOD PRESSURE: 102 MMHG | DIASTOLIC BLOOD PRESSURE: 74 MMHG | TEMPERATURE: 97.7 F | HEART RATE: 85 BPM | HEIGHT: 64 IN | WEIGHT: 127 LBS | BODY MASS INDEX: 21.68 KG/M2

## 2020-10-06 DIAGNOSIS — M79.609 PAIN IN LIMB: ICD-10-CM

## 2020-10-06 DIAGNOSIS — M51.24 THORACIC DISC HERNIATION: ICD-10-CM

## 2020-10-06 DIAGNOSIS — G89.29 CHRONIC LEFT-SIDED THORACIC BACK PAIN: Primary | ICD-10-CM

## 2020-10-06 DIAGNOSIS — M54.6 CHRONIC LEFT-SIDED THORACIC BACK PAIN: Primary | ICD-10-CM

## 2020-10-06 DIAGNOSIS — M54.14 THORACIC RADICULOPATHY: ICD-10-CM

## 2020-10-06 PROCEDURE — 93971 EXTREMITY STUDY: CPT

## 2020-10-06 PROCEDURE — 99214 OFFICE O/P EST MOD 30 MIN: CPT | Performed by: NURSE PRACTITIONER

## 2020-10-06 PROCEDURE — 93971 EXTREMITY STUDY: CPT | Performed by: INTERNAL MEDICINE

## 2020-10-22 ENCOUNTER — HOSPITAL ENCOUNTER (OUTPATIENT)
Dept: RADIOLOGY | Facility: CLINIC | Age: 60
Discharge: HOME | End: 2020-10-22
Attending: ANESTHESIOLOGY | Admitting: ANESTHESIOLOGY
Payer: COMMERCIAL

## 2020-10-22 VITALS
HEART RATE: 76 BPM | SYSTOLIC BLOOD PRESSURE: 134 MMHG | OXYGEN SATURATION: 97 % | DIASTOLIC BLOOD PRESSURE: 77 MMHG | RESPIRATION RATE: 20 BRPM | TEMPERATURE: 98.4 F

## 2020-10-22 DIAGNOSIS — G89.29 CHRONIC LEFT-SIDED THORACIC BACK PAIN: ICD-10-CM

## 2020-10-22 DIAGNOSIS — M54.6 CHRONIC LEFT-SIDED THORACIC BACK PAIN: ICD-10-CM

## 2020-10-22 DIAGNOSIS — M54.14 THORACIC RADICULOPATHY: ICD-10-CM

## 2020-10-22 PROCEDURE — 62321 NJX INTERLAMINAR CRV/THRC: CPT | Performed by: ANESTHESIOLOGY

## 2020-10-22 RX ORDER — LIDOCAINE HYDROCHLORIDE 10 MG/ML
5 INJECTION, SOLUTION EPIDURAL; INFILTRATION; INTRACAUDAL; PERINEURAL ONCE
Status: COMPLETED | OUTPATIENT
Start: 2020-10-22 | End: 2020-10-22

## 2020-10-22 RX ORDER — PAPAVERINE HCL 150 MG
20 CAPSULE, EXTENDED RELEASE ORAL ONCE
Status: COMPLETED | OUTPATIENT
Start: 2020-10-22 | End: 2020-10-22

## 2020-10-22 RX ADMIN — LIDOCAINE HYDROCHLORIDE 3 ML: 10 INJECTION, SOLUTION EPIDURAL; INFILTRATION; INTRACAUDAL; PERINEURAL at 14:40

## 2020-10-22 RX ADMIN — DEXAMETHASONE SODIUM PHOSPHATE 20 MG: 10 INJECTION, SOLUTION INTRAMUSCULAR; INTRAVENOUS at 14:42

## 2020-10-22 RX ADMIN — IOHEXOL 1 ML: 300 INJECTION, SOLUTION INTRAVENOUS at 14:42

## 2020-10-23 ENCOUNTER — OFFICE VISIT (OUTPATIENT)
Dept: NEUROSURGERY | Facility: CLINIC | Age: 60
End: 2020-10-23

## 2020-10-23 ENCOUNTER — TELEPHONE (OUTPATIENT)
Dept: PAIN MEDICINE | Facility: CLINIC | Age: 60
End: 2020-10-23

## 2020-10-23 ENCOUNTER — TELEPHONE (OUTPATIENT)
Dept: RADIOLOGY | Facility: CLINIC | Age: 60
End: 2020-10-23

## 2020-10-23 ENCOUNTER — TELEPHONE (OUTPATIENT)
Dept: NEUROSURGERY | Facility: CLINIC | Age: 60
End: 2020-10-23

## 2020-10-23 VITALS
WEIGHT: 127 LBS | TEMPERATURE: 98.9 F | HEART RATE: 99 BPM | HEIGHT: 64 IN | DIASTOLIC BLOOD PRESSURE: 8 MMHG | SYSTOLIC BLOOD PRESSURE: 128 MMHG | RESPIRATION RATE: 16 BRPM | BODY MASS INDEX: 21.68 KG/M2

## 2020-10-23 DIAGNOSIS — M21.372 LEFT FOOT DROP: Primary | ICD-10-CM

## 2020-10-23 DIAGNOSIS — M96.1 LUMBAR POST-LAMINECTOMY SYNDROME: Primary | ICD-10-CM

## 2020-10-23 DIAGNOSIS — M54.16 LUMBAR RADICULITIS: ICD-10-CM

## 2020-10-23 PROCEDURE — 99024 POSTOP FOLLOW-UP VISIT: CPT | Performed by: PHYSICIAN ASSISTANT

## 2020-10-23 RX ORDER — IBUPROFEN 800 MG/1
TABLET ORAL EVERY 8 HOURS PRN
COMMUNITY

## 2020-10-27 ENCOUNTER — TELEPHONE (OUTPATIENT)
Dept: NEUROSURGERY | Facility: CLINIC | Age: 60
End: 2020-10-27

## 2020-10-27 DIAGNOSIS — M54.16 LUMBAR RADICULOPATHY: Primary | ICD-10-CM

## 2020-10-27 RX ORDER — MELOXICAM 15 MG/1
15 TABLET ORAL DAILY
Qty: 30 TABLET | Refills: 0 | OUTPATIENT
Start: 2020-10-27 | End: 2022-05-31

## 2020-10-29 ENCOUNTER — TELEPHONE (OUTPATIENT)
Dept: PAIN MEDICINE | Facility: CLINIC | Age: 60
End: 2020-10-29

## 2020-11-06 ENCOUNTER — HOSPITAL ENCOUNTER (OUTPATIENT)
Dept: RADIOLOGY | Facility: CLINIC | Age: 60
Discharge: HOME | End: 2020-11-06
Attending: ANESTHESIOLOGY | Admitting: ANESTHESIOLOGY
Payer: COMMERCIAL

## 2020-11-06 VITALS
OXYGEN SATURATION: 94 % | HEART RATE: 97 BPM | SYSTOLIC BLOOD PRESSURE: 133 MMHG | TEMPERATURE: 97.5 F | DIASTOLIC BLOOD PRESSURE: 83 MMHG | RESPIRATION RATE: 20 BRPM

## 2020-11-06 DIAGNOSIS — M54.16 LUMBAR RADICULITIS: ICD-10-CM

## 2020-11-06 DIAGNOSIS — M96.1 LUMBAR POST-LAMINECTOMY SYNDROME: ICD-10-CM

## 2020-11-06 PROCEDURE — 64483 NJX AA&/STRD TFRM EPI L/S 1: CPT | Performed by: ANESTHESIOLOGY

## 2020-11-06 PROCEDURE — 64484 NJX AA&/STRD TFRM EPI L/S EA: CPT | Performed by: ANESTHESIOLOGY

## 2020-11-06 RX ORDER — PAPAVERINE HCL 150 MG
20 CAPSULE, EXTENDED RELEASE ORAL ONCE
Status: COMPLETED | OUTPATIENT
Start: 2020-11-06 | End: 2020-11-06

## 2020-11-06 RX ORDER — LIDOCAINE HYDROCHLORIDE 10 MG/ML
5 INJECTION, SOLUTION EPIDURAL; INFILTRATION; INTRACAUDAL; PERINEURAL ONCE
Status: COMPLETED | OUTPATIENT
Start: 2020-11-06 | End: 2020-11-06

## 2020-11-06 RX ADMIN — DEXAMETHASONE SODIUM PHOSPHATE 20 MG: 10 INJECTION, SOLUTION INTRAMUSCULAR; INTRAVENOUS at 12:09

## 2020-11-06 RX ADMIN — IOHEXOL 1 ML: 300 INJECTION, SOLUTION INTRAVENOUS at 12:09

## 2020-11-06 RX ADMIN — LIDOCAINE HYDROCHLORIDE 3 ML: 10 INJECTION, SOLUTION EPIDURAL; INFILTRATION; INTRACAUDAL; PERINEURAL at 12:07

## 2020-11-06 RX ADMIN — LIDOCAINE HYDROCHLORIDE 2 ML: 20 INJECTION, SOLUTION EPIDURAL; INFILTRATION; INTRACAUDAL at 12:07

## 2020-11-09 ENCOUNTER — TELEPHONE (OUTPATIENT)
Dept: OTHER | Facility: HOSPITAL | Age: 60
End: 2020-11-09

## 2020-11-09 ENCOUNTER — TELEPHONE (OUTPATIENT)
Dept: NEUROSURGERY | Facility: CLINIC | Age: 60
End: 2020-11-09

## 2020-11-09 ENCOUNTER — HOSPITAL ENCOUNTER (OUTPATIENT)
Dept: MRI IMAGING | Facility: HOSPITAL | Age: 60
Discharge: HOME | End: 2020-11-09
Payer: COMMERCIAL

## 2020-11-09 DIAGNOSIS — M21.372 LEFT FOOT DROP: ICD-10-CM

## 2020-11-09 PROCEDURE — G1004 CDSM NDSC: HCPCS

## 2020-11-09 PROCEDURE — 72148 MRI LUMBAR SPINE W/O DYE: CPT

## 2020-11-10 ENCOUNTER — TELEPHONE (OUTPATIENT)
Dept: PAIN MEDICINE | Facility: CLINIC | Age: 60
End: 2020-11-10

## 2020-11-10 ENCOUNTER — OFFICE VISIT (OUTPATIENT)
Dept: NEUROSURGERY | Facility: CLINIC | Age: 60
End: 2020-11-10

## 2020-11-10 VITALS
WEIGHT: 127 LBS | TEMPERATURE: 97.7 F | DIASTOLIC BLOOD PRESSURE: 70 MMHG | BODY MASS INDEX: 21.68 KG/M2 | HEIGHT: 64 IN | RESPIRATION RATE: 16 BRPM | SYSTOLIC BLOOD PRESSURE: 120 MMHG | HEART RATE: 82 BPM

## 2020-11-10 DIAGNOSIS — M54.16 LUMBAR RADICULOPATHY: Primary | ICD-10-CM

## 2020-11-10 PROCEDURE — 99024 POSTOP FOLLOW-UP VISIT: CPT | Performed by: NURSE PRACTITIONER

## 2020-11-10 RX ORDER — SIMVASTATIN 40 MG
40 TABLET ORAL DAILY
COMMUNITY
Start: 2020-10-26

## 2020-11-13 ENCOUNTER — TELEPHONE (OUTPATIENT)
Dept: PAIN MEDICINE | Facility: CLINIC | Age: 60
End: 2020-11-13

## 2020-11-17 DIAGNOSIS — M54.16 LUMBAR RADICULOPATHY: ICD-10-CM

## 2020-11-17 RX ORDER — MELOXICAM 15 MG/1
TABLET ORAL
Qty: 30 TABLET | Refills: 0 | OUTPATIENT
Start: 2020-11-17

## 2020-11-30 ENCOUNTER — HOSPITAL ENCOUNTER (OUTPATIENT)
Dept: RADIOLOGY | Facility: CLINIC | Age: 60
Discharge: HOME | End: 2020-11-30
Attending: ANESTHESIOLOGY | Admitting: ANESTHESIOLOGY
Payer: COMMERCIAL

## 2020-11-30 VITALS
HEART RATE: 83 BPM | OXYGEN SATURATION: 97 % | TEMPERATURE: 98 F | SYSTOLIC BLOOD PRESSURE: 117 MMHG | RESPIRATION RATE: 18 BRPM | DIASTOLIC BLOOD PRESSURE: 73 MMHG

## 2020-11-30 DIAGNOSIS — M96.1 LUMBAR POST-LAMINECTOMY SYNDROME: ICD-10-CM

## 2020-11-30 DIAGNOSIS — M54.16 LUMBAR RADICULITIS: ICD-10-CM

## 2020-11-30 PROCEDURE — 64483 NJX AA&/STRD TFRM EPI L/S 1: CPT | Performed by: ANESTHESIOLOGY

## 2020-11-30 PROCEDURE — 64484 NJX AA&/STRD TFRM EPI L/S EA: CPT | Performed by: ANESTHESIOLOGY

## 2020-11-30 RX ORDER — LIDOCAINE HYDROCHLORIDE 10 MG/ML
5 INJECTION, SOLUTION EPIDURAL; INFILTRATION; INTRACAUDAL; PERINEURAL ONCE
Status: COMPLETED | OUTPATIENT
Start: 2020-11-30 | End: 2020-11-30

## 2020-11-30 RX ORDER — PAPAVERINE HCL 150 MG
20 CAPSULE, EXTENDED RELEASE ORAL ONCE
Status: COMPLETED | OUTPATIENT
Start: 2020-11-30 | End: 2020-11-30

## 2020-11-30 RX ADMIN — IOHEXOL 1 ML: 300 INJECTION, SOLUTION INTRAVENOUS at 10:30

## 2020-11-30 RX ADMIN — LIDOCAINE HYDROCHLORIDE 3 ML: 10 INJECTION, SOLUTION EPIDURAL; INFILTRATION; INTRACAUDAL; PERINEURAL at 10:29

## 2020-11-30 RX ADMIN — DEXAMETHASONE SODIUM PHOSPHATE 20 MG: 10 INJECTION, SOLUTION INTRAMUSCULAR; INTRAVENOUS at 10:30

## 2020-11-30 RX ADMIN — LIDOCAINE HYDROCHLORIDE 2 ML: 20 INJECTION, SOLUTION EPIDURAL; INFILTRATION; INTRACAUDAL at 10:29

## 2020-12-07 ENCOUNTER — TELEPHONE (OUTPATIENT)
Dept: PAIN MEDICINE | Facility: CLINIC | Age: 60
End: 2020-12-07

## 2020-12-07 NOTE — TELEPHONE ENCOUNTER
Pt reports some improvement post inj   Pain level she said depends on what shes doing     S/p Lt L4-L5 TFESI 11/30 F/u 12/30

## 2020-12-30 ENCOUNTER — TELEMEDICINE (OUTPATIENT)
Dept: PAIN MEDICINE | Facility: CLINIC | Age: 60
End: 2020-12-30
Payer: COMMERCIAL

## 2020-12-30 ENCOUNTER — HOSPITAL ENCOUNTER (OUTPATIENT)
Dept: RADIOLOGY | Facility: HOSPITAL | Age: 60
Discharge: HOME | End: 2020-12-30
Payer: COMMERCIAL

## 2020-12-30 DIAGNOSIS — M79.672 ACUTE FOOT PAIN, LEFT: ICD-10-CM

## 2020-12-30 DIAGNOSIS — M54.16 LUMBAR RADICULOPATHY: ICD-10-CM

## 2020-12-30 DIAGNOSIS — G89.29 CHRONIC BILATERAL LOW BACK PAIN WITHOUT SCIATICA: Primary | ICD-10-CM

## 2020-12-30 DIAGNOSIS — M54.50 CHRONIC BILATERAL LOW BACK PAIN WITHOUT SCIATICA: Primary | ICD-10-CM

## 2020-12-30 DIAGNOSIS — M47.816 LUMBAR SPONDYLOSIS: ICD-10-CM

## 2020-12-30 DIAGNOSIS — M54.6 CHRONIC LEFT-SIDED THORACIC BACK PAIN: ICD-10-CM

## 2020-12-30 DIAGNOSIS — G89.29 CHRONIC LEFT-SIDED THORACIC BACK PAIN: ICD-10-CM

## 2020-12-30 DIAGNOSIS — M54.14 THORACIC RADICULOPATHY: ICD-10-CM

## 2020-12-30 DIAGNOSIS — M51.24 THORACIC DISC HERNIATION: ICD-10-CM

## 2020-12-30 DIAGNOSIS — M96.1 LUMBAR POST-LAMINECTOMY SYNDROME: ICD-10-CM

## 2020-12-30 PROCEDURE — 99213 OFFICE O/P EST LOW 20 MIN: CPT | Performed by: NURSE PRACTITIONER

## 2020-12-30 PROCEDURE — 73630 X-RAY EXAM OF FOOT: CPT

## 2021-01-05 ENCOUNTER — TELEPHONE (OUTPATIENT)
Dept: PAIN MEDICINE | Facility: CLINIC | Age: 61
End: 2021-01-05

## 2021-01-05 NOTE — TELEPHONE ENCOUNTER
Can you please call the patient and let her know that her left foot x-rays were normal without any evidence of fractures  For now, I would continue to slowly work on increasing her activity as tolerated and f/u PRN as we discussed during her last Telehealth visit  Thank you

## 2021-04-14 ENCOUNTER — ANNUAL EXAM (OUTPATIENT)
Dept: OBGYN CLINIC | Facility: CLINIC | Age: 61
End: 2021-04-14
Payer: COMMERCIAL

## 2021-04-14 VITALS
BODY MASS INDEX: 22.47 KG/M2 | DIASTOLIC BLOOD PRESSURE: 58 MMHG | WEIGHT: 126.8 LBS | HEIGHT: 63 IN | TEMPERATURE: 97.7 F | SYSTOLIC BLOOD PRESSURE: 110 MMHG

## 2021-04-14 DIAGNOSIS — Z12.31 ENCOUNTER FOR SCREENING MAMMOGRAM FOR MALIGNANT NEOPLASM OF BREAST: ICD-10-CM

## 2021-04-14 DIAGNOSIS — Z01.419 ENCOUNTER FOR ANNUAL ROUTINE GYNECOLOGICAL EXAMINATION: Primary | ICD-10-CM

## 2021-04-14 DIAGNOSIS — Z13.820 SCREENING FOR OSTEOPOROSIS: ICD-10-CM

## 2021-04-14 DIAGNOSIS — R32 URINARY INCONTINENCE IN FEMALE: ICD-10-CM

## 2021-04-14 DIAGNOSIS — Z78.0 POSTMENOPAUSAL: ICD-10-CM

## 2021-04-14 DIAGNOSIS — Z79.890 HORMONE REPLACEMENT THERAPY (HRT): ICD-10-CM

## 2021-04-14 PROCEDURE — 99396 PREV VISIT EST AGE 40-64: CPT | Performed by: OBSTETRICS & GYNECOLOGY

## 2021-04-14 RX ORDER — CHLORAL HYDRATE 500 MG
1000 CAPSULE ORAL DAILY
COMMUNITY

## 2021-04-14 RX ORDER — GREEN TEA/HOODIA GORDONII 315-12.5MG
CAPSULE ORAL
COMMUNITY
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
## 2021-04-14 NOTE — ASSESSMENT & PLAN NOTE
Dicussed w/ pt she may benefit from pelvic floor PT  She will consider  Referral given and pt given information for [facility] and [facility] Pelvic PT  Nephrology progress note    Patient is seen and examined, events over the last 24 h noted .  SOB at baseline.   Allergies:  aspirin (Other)  fish (Other)  iodine (Hives)  penicillin (Unknown)  shellfish (Other)    Hospital Medications:   MEDICATIONS  (STANDING):  acetaminophen   Tablet .. 975 milliGRAM(s) Oral every 8 hours  ALBUTerol    0.083% 2.5 milliGRAM(s) Nebulizer every 6 hours  ascorbic acid 500 milliGRAM(s) Oral daily  atorvastatin 20 milliGRAM(s) Oral at bedtime  BACItracin   Ointment 1 Application(s) Topical three times a day  buDESOnide 160 MICROgram(s)/formoterol 4.5 MICROgram(s) Inhaler 2 Puff(s) Inhalation two times a day  carbamide peroxide Otic Solution 1 Drop(s) Both Ears two times a day  cholecalciferol 1000 Unit(s) Oral daily  dextrose 5%. 1000 milliLiter(s) (50 mL/Hr) IV Continuous <Continuous>  dextrose 50% Injectable 12.5 Gram(s) IV Push once  dextrose 50% Injectable 25 Gram(s) IV Push once  dextrose 50% Injectable 25 Gram(s) IV Push once  docusate sodium 100 milliGRAM(s) Oral three times a day  finasteride 5 milliGRAM(s) Oral daily  furosemide    Tablet 40 milliGRAM(s) Oral two times a day  heparin  Injectable 5000 Unit(s) SubCutaneous three times a day  influenza   Vaccine 0.5 milliLiter(s) IntraMuscular once  insulin glargine Injectable (LANTUS) 30 Unit(s) SubCutaneous every morning  insulin lispro (HumaLOG) corrective regimen sliding scale   SubCutaneous three times a day before meals  insulin lispro Injectable (HumaLOG) 4 Unit(s) SubCutaneous three times a day before meals  levoFLOXacin  Tablet 250 milliGRAM(s) Oral every 24 hours  lidocaine   Patch 2 Patch Transdermal daily  lidocaine   Patch 1 Patch Transdermal daily  magnesium oxide 400 milliGRAM(s) Oral three times a day with meals  methocarbamol 750 milliGRAM(s) Oral four times a day  metoprolol tartrate 25 milliGRAM(s) Oral two times a day  nystatin    Suspension 138961 Unit(s) Oral three times a day  pantoprazole    Tablet 40 milliGRAM(s) Oral before breakfast  predniSONE   Tablet 20 milliGRAM(s) Oral two times a day  tamsulosin 0.4 milliGRAM(s) Oral at bedtime  tiotropium 18 MICROgram(s) Capsule 1 Capsule(s) Inhalation daily        VITALS:  T(F): 97.9 (10-01-19 @ 05:34), Max: 99.8 (19 @ 14:10)  HR: 120 (10-01-19 @ 05:34)  BP: 112/70 (10-01-19 @ 05:34)  RR: 18 (10-01-19 @ 05:34)  SpO2: --  Wt(kg): --     @ 07:01  -   @ 07:00  --------------------------------------------------------  IN: 0 mL / OUT: 900 mL / NET: -900 mL          PHYSICAL EXAM:  Constitutional: NAD, obese  Respiratory: b/l wheezing  Cardiovascular: S1, S2, RRR  Gastrointestinal: BS+, soft, NT/ND  Extremities: 1+ peripheral edema  Neurological: A/O x 3  : No CVA tenderness. No chapman.   Skin: No rashes  Vascular Access:    LABS:      143  |  96<L>  |  88<HH>  ----------------------------<  194<H>  4.8   |  33<H>  |  2.2<H>    Ca    9.5      30 Sep 2019 06:56                            14.4   18.80 )-----------( 421      ( 30 Sep 2019 06:56 )             49.1       Urine Studies:  Urinalysis Basic - ( 28 Sep 2019 08:30 )    Color: Yellow / Appearance: Clear / S.020 / pH:   Gluc:  / Ketone: Negative  / Bili: Negative / Urobili: 0.2 mg/dL   Blood:  / Protein: Negative mg/dL / Nitrite: Negative   Leuk Esterase: Negative / RBC:  / WBC    Sq Epi:  / Non Sq Epi:  / Bacteria:         RADIOLOGY & ADDITIONAL STUDIES:

## 2021-04-23 ENCOUNTER — EVALUATION (OUTPATIENT)
Dept: PHYSICAL THERAPY | Facility: CLINIC | Age: 61
End: 2021-04-23
Payer: COMMERCIAL

## 2021-04-23 DIAGNOSIS — R32 URINARY INCONTINENCE IN FEMALE: Primary | ICD-10-CM

## 2021-04-23 PROCEDURE — 97112 NEUROMUSCULAR REEDUCATION: CPT | Performed by: PHYSICAL THERAPIST

## 2021-04-23 PROCEDURE — 97163 PT EVAL HIGH COMPLEX 45 MIN: CPT | Performed by: PHYSICAL THERAPIST
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
## 2021-04-29 ENCOUNTER — OFFICE VISIT (OUTPATIENT)
Dept: PHYSICAL THERAPY | Facility: CLINIC | Age: 61
End: 2021-04-29
Payer: COMMERCIAL

## 2021-04-29 DIAGNOSIS — R32 URINARY INCONTINENCE IN FEMALE: ICD-10-CM

## 2021-04-29 PROCEDURE — 90913 BFB TRAINING EA ADDL 15 MIN: CPT | Performed by: PHYSICAL THERAPIST

## 2021-04-29 PROCEDURE — 97530 THERAPEUTIC ACTIVITIES: CPT | Performed by: PHYSICAL THERAPIST

## 2021-04-29 PROCEDURE — 90912 BFB TRAINING 1ST 15 MIN: CPT | Performed by: PHYSICAL THERAPIST

## 2021-04-29 PROCEDURE — 97110 THERAPEUTIC EXERCISES: CPT | Performed by: PHYSICAL THERAPIST

## 2021-04-29 NOTE — PROGRESS NOTES
Daily Note     Today's date: 2021  Patient name: Pete Ordonez  : 1960  MRN: [id number]  Referring provider: Josse Blank MD  Dx:   Encounter Diagnosis     ICD-10-CM    1  Urinary incontinence in female  R32 Ambulatory referral to Physical Therapy                  Subjective: "No new changes "      Objective: See treatment diary below      Assessment: Tolerated treatment well  Patient demonstrated fatigue post treatment and would benefit from continued PT for PFM strengthening  Pt presents with decreased endurance of quick and prolong hold of PFM  Pt was educated on abdominal quieting and HEP  Plan: Continue per plan of care  Progress treatment as tolerated         Precautions: not any given      Manuals            PFM MMT 2+/5 vag             2+/5 anal            abd scar mob next            abd myofascial manual tech             Neuro Re-Ed                          [facility] PT anatomy, purpose/benefits/edu 10'                                      Biofeedback use  SZ                                     Ther Ex             Quick flicks  04P6"           Prolong hold  10x5"           PF/TA  10x3"           PF/TA/AD's  10x3"           PF/TA/AB's                                                    Ther Activity             HEP review next 5'           Breathing edu next 5'           Gait Training                                       Modalities

## 2021-05-04 NOTE — PROGRESS NOTES

## 2021-05-05 DIAGNOSIS — Z79.890 HORMONE REPLACEMENT THERAPY (HRT): ICD-10-CM

## 2021-05-06 ENCOUNTER — OFFICE VISIT (OUTPATIENT)
Dept: PHYSICAL THERAPY | Facility: CLINIC | Age: 61
End: 2021-05-06
Payer: COMMERCIAL

## 2021-05-06 DIAGNOSIS — R32 URINARY INCONTINENCE IN FEMALE: Primary | ICD-10-CM

## 2021-05-06 PROCEDURE — 97530 THERAPEUTIC ACTIVITIES: CPT | Performed by: PHYSICAL THERAPIST

## 2021-05-06 PROCEDURE — 97110 THERAPEUTIC EXERCISES: CPT | Performed by: PHYSICAL THERAPIST

## 2021-05-06 PROCEDURE — 97112 NEUROMUSCULAR REEDUCATION: CPT | Performed by: PHYSICAL THERAPIST

## 2021-05-06 NOTE — PROGRESS NOTES
Daily Note     Today's date: 2021  Patient name: Tai Sterling  : 1960  MRN: [id number]  Referring provider: Joyce Toscano MD  Dx:   Encounter Diagnosis     ICD-10-CM    1  Urinary incontinence in female  R32                   Subjective: "I am sitting more upright  I am more aware of my pelvis with ex "      Objective: See treatment diary below      Assessment: Tolerated treatment well  Patient demonstrated fatigue post treatment and would benefit from continued PT for PFM strengthening as baudilio  Slow progression from supine to sit  HEP was given and reviewed  Plan: Continue per plan of care  Progress treatment as tolerated         Precautions: not any given      Manuals            PFM MMT 2+/5 vag             2+/5 anal            abd scar mob next            abd myofascial manual tech             Neuro Re-Ed                          [facility] PT anatomy, purpose/benefits/edu 10'                                      Biofeedback use  SZ           Bike/postue/PF   10'                       Ther Ex             Quick flicks  04A2"           Prolong hold  10x5"           PF/TA  10x3" 10x3"          PF/TA/AD's  10x3" 10x3"          PF/TA/AB's   10x3"          Supine u/l LE lift/PF   10x3"          Bridge/PF   10x3"          Bridge/PF/u/l LE lift   10x3"          Sitting (roll use) PF   10x3"                                                 Ther Activity             HEP review next 5' 5'          Breathing edu next 5'           Postural edu/correction   5'          Gait Training                                       Modalities

## 2021-05-06 NOTE — TELEPHONE ENCOUNTER
Pt seen 4/14/2021 and refill for 1 year to pharmacy  Please check with pharmacy, I believe was sent at visit

## 2021-05-07 NOTE — TELEPHONE ENCOUNTER
Called the pharmacy and the pharmacist states to disregard the request as she confirmed the Rx that Dr Ophelia Omalley transmitted on 04/14/21 was received

## 2021-05-13 ENCOUNTER — OFFICE VISIT (OUTPATIENT)
Dept: PHYSICAL THERAPY | Facility: CLINIC | Age: 61
End: 2021-05-13
Payer: COMMERCIAL

## 2021-05-13 DIAGNOSIS — R32 URINARY INCONTINENCE IN FEMALE: Primary | ICD-10-CM

## 2021-05-13 PROCEDURE — 97112 NEUROMUSCULAR REEDUCATION: CPT | Performed by: PHYSICAL THERAPIST

## 2021-05-13 PROCEDURE — 97530 THERAPEUTIC ACTIVITIES: CPT | Performed by: PHYSICAL THERAPIST

## 2021-05-13 PROCEDURE — 97110 THERAPEUTIC EXERCISES: CPT | Performed by: PHYSICAL THERAPIST

## 2021-05-13 NOTE — PROGRESS NOTES
Daily Note     Today's date: 2021  Patient name: Marquis Park  : 1960  MRN: [id number]  Referring provider: Gildardo Rivera MD  Dx:   Encounter Diagnosis     ICD-10-CM    1  Urinary incontinence in female  R32                   Subjective: "I am not sure if I do my bridging exercises correctly, because I don't feel my contractions in pelvic muscles  But I am more aware of my pelvic floor  No fecal smearing and I feel stronger to delay the urge to urinate "      Objective: See treatment diary below      Assessment: Tolerated treatment well  Patient demonstrated fatigue post treatment and would benefit from continued PT  Pt is very motivated and compliant with HEP  Progressing into sitting therex with postural correction  No pain after tx  Plan: Continue per plan of care  Progress treatment as tolerated         Precautions: not any given      Manuals          PFM MMT 2+/5 vag   next          2+/5 anal   next         abd scar mob next            abd myofascial manual tech             Neuro Re-Ed                          [facility] PT anatomy, purpose/benefits/edu 10'                                      Biofeedback use  SZ           Bike/postue/PF   10' 10'                      Ther Ex             Quick flicks  71B6"           Prolong hold  10x5"           PF/TA  10x3" 10x3" 10x3"         PF/TA/AD's  10x3" 10x3"          PF/TA/AB's   10x3"          Supine u/l LE lift/PF   10x3"          Bridge/PF   10x3" 5x3"         Bridge/PF/u/l LE lift   10x3" 5x3"         Sitting (roll use) PF   10x3" 10x3" no roll needed         Sitting PF/TA/AD's    10x3"         Sitting PF/TA/AB's    10x3"         Sitting PF/u/l TKE    10x3"         Ther Activity             HEP review next 5' 5' 5'         Breathing edu next 5'  5'         Postural edu/correction   5'          Gait Training                                       Modalities

## 2021-05-20 ENCOUNTER — OFFICE VISIT (OUTPATIENT)
Dept: PHYSICAL THERAPY | Facility: CLINIC | Age: 61
End: 2021-05-20
Payer: COMMERCIAL

## 2021-05-20 DIAGNOSIS — R32 URINARY INCONTINENCE IN FEMALE: Primary | ICD-10-CM

## 2021-05-20 PROCEDURE — 97110 THERAPEUTIC EXERCISES: CPT | Performed by: PHYSICAL THERAPIST

## 2021-05-20 PROCEDURE — 90912 BFB TRAINING 1ST 15 MIN: CPT | Performed by: PHYSICAL THERAPIST

## 2021-05-20 PROCEDURE — 90913 BFB TRAINING EA ADDL 15 MIN: CPT | Performed by: PHYSICAL THERAPIST

## 2021-05-20 PROCEDURE — 97140 MANUAL THERAPY 1/> REGIONS: CPT | Performed by: PHYSICAL THERAPIST

## 2021-05-20 NOTE — PROGRESS NOTES
Daily Note     Today's date: 2021  Patient name: Kaykay Velasquez  : 1960  MRN: [id number]  Referring provider: Kamilah Glez MD  Dx:   Encounter Diagnosis     ICD-10-CM    1  Urinary incontinence in female  R32                   Subjective: "I feel little better, stronger "      Objective: See treatment diary below      Assessment: Tolerated treatment well  Patient exhibited good technique with therapeutic exercises and would benefit from continued PT  Pt shows improvement in PFM MMT and PFM endurance using biofeedback assessment  HEP was updated and reviewed from sit to stand  Plan: Continue per plan of care  Progress treatment as tolerated         Precautions: not any given      Manuals         PFM MMT 2+/5 vag   next 3/5 to 3+/5 vag         2+/5 anal   next         abd scar mob next            abd myofascial manual tech             Neuro Re-Ed                          [facility] PT anatomy, purpose/benefits/edu 10'                                      Biofeedback use  SZ   SZ        Bike/postue/PF   10' 10'                      Ther Ex             Quick flicks  98S2"           Prolong hold  10x5"           PF/TA  10x3" 10x3" 10x3"         PF/TA/AD's  10x3" 10x3"          PF/TA/AB's   10x3"          Supine u/l LE lift/PF   10x3"          Bridge/PF   10x3" 5x3"         Bridge/PF/u/l LE lift   10x3" 5x3"         Sitting (roll use) PF   10x3" 10x3" no roll needed         Sitting PF/TA/AD's    10x3"         Sitting PF/TA/AB's    10x3"         Sitting PF/u/l TKE    10x3"         Sit to stand/PF     5x5"        Standing mini squat     5x5"        Standing u/l stance/PF     5x5"        Standing FF lunges/PF     5c5"                     Ther Activity             HEP review next 5' 5' 5' 5'        Breathing edu next 5'  5'         Postural edu/correction   5'          Gait Training                                       Modalities

## 2021-05-26 ENCOUNTER — OFFICE VISIT (OUTPATIENT)
Dept: PHYSICAL THERAPY | Facility: CLINIC | Age: 61
End: 2021-05-26
Payer: COMMERCIAL

## 2021-05-26 DIAGNOSIS — R32 URINARY INCONTINENCE IN FEMALE: Primary | ICD-10-CM

## 2021-05-26 PROCEDURE — 97530 THERAPEUTIC ACTIVITIES: CPT | Performed by: PHYSICAL THERAPIST

## 2021-05-26 PROCEDURE — 97112 NEUROMUSCULAR REEDUCATION: CPT | Performed by: PHYSICAL THERAPIST

## 2021-05-26 PROCEDURE — 97110 THERAPEUTIC EXERCISES: CPT | Performed by: PHYSICAL THERAPIST

## 2021-05-26 NOTE — PROGRESS NOTES
Daily Note     Today's date: 2021  Patient name: Tai Sterling  : 1960  MRN: [id number]  Referring provider: Joyce Toscano MD  Dx:   Encounter Diagnosis     ICD-10-CM    1  Urinary incontinence in female  R32                   Subjective: No new changes  Objective: See treatment diary below      Assessment: Tolerated treatment well  Patient demonstrated fatigue post treatment, exhibited good technique with therapeutic exercises and would benefit from continued PT for further PFM strengthening with increased intensity in gym setting  No discomfort after tx verbalized  Plan: Continue per plan of care  Progress treatment as tolerated         Precautions: not any given      Manuals        PFM MMT 2+/5 vag   next 3/5 to 3+/5 vag  next       2+/5 anal   next         abd scar mob next            abd myofascial manual tech             Neuro Re-Ed                          [facility] PT anatomy, purpose/benefits/edu 10'                                      Biofeedback use  SZ   SZ        Bike/postue/PF   10' 10'  10'                    Ther Ex             Quick flicks  96Z8"           Prolong hold  10x5"           PF/TA  10x3" 10x3" 10x3"         PF/TA/AD's  10x3" 10x3"          PF/TA/AB's   10x3"          Supine u/l LE lift/PF   10x3"          Bridge/PF   10x3" 5x3"         Bridge/PF/u/l LE lift   10x3" 5x3"         Sitting (roll use) PF   10x3" 10x3" no roll needed         Sitting PF/TA/AD's    10x3"         Sitting PF/TA/AB's    10x3"         Sitting PF/u/l TKE    10x3"         Sit to stand/PF     5x5" Rev  5"       Standing mini squat     5x5" Rev  5"       Standing u/l stance/PF     5x5" Rev  5"       Standing FF lunges/PF     5x5" Rev  5"       Leg press/PF      L2 10x2 5" ea       Apollo B TKE/PF      L1 10x2       Apollo B HS curls/PF      L3 10x2       Happy Baby PF stretch      5x20" ea                                 Ther Activity             HEP review next 5' 5' 5' 5' 5'       Breathing edu next 5'  5'         Postural edu/correction   5'   5'       Gait Training                                       Modalities

## 2021-06-10 ENCOUNTER — OFFICE VISIT (OUTPATIENT)
Dept: PHYSICAL THERAPY | Facility: CLINIC | Age: 61
End: 2021-06-10
Payer: COMMERCIAL

## 2021-06-10 DIAGNOSIS — R32 URINARY INCONTINENCE IN FEMALE: Primary | ICD-10-CM

## 2021-06-10 PROCEDURE — 97112 NEUROMUSCULAR REEDUCATION: CPT | Performed by: PHYSICAL THERAPIST

## 2021-06-10 PROCEDURE — 97110 THERAPEUTIC EXERCISES: CPT | Performed by: PHYSICAL THERAPIST

## 2021-06-10 PROCEDURE — 97530 THERAPEUTIC ACTIVITIES: CPT | Performed by: PHYSICAL THERAPIST

## 2021-06-10 NOTE — PROGRESS NOTES
Daily Note     Today's date: 6/10/2021  Patient name: Jennifer Bonner  : 1960  MRN: [id number]  Referring provider: Malgorzata Walker MD  Dx:   Encounter Diagnosis     ICD-10-CM    1  Urinary incontinence in female  R32                   Subjective: "I feel strong, no leakage "      Objective: See treatment diary below  STG's/LTG's (all met)  1  Independent and safe with PFM HEP  2  Independent and safe with self-postural correction  3  Independent and safe with body mechanics to correctly activate PFM with lifting/carrying objects  4  Improvement in PFM MMT by 1-2 grades to max bladder control with dynamic ADL's at home post D/C  Assessment: Tolerated treatment well  Patient exhibited good technique with therapeutic exercises, goals met, max potential reached at this time  Pt is fully independent with all HEP  No pain after tx  Plan: D/C PT at this time       Precautions: not any given      Manuals 4/23 4/29 5/6 5/13 5/20 5/26 6/10      PFM MMT 2+/5 vag   next 3/5 to 3+/5 vag  3+/5       2+/5 anal   next         abd scar mob next            abd myofascial manual tech             Neuro Re-Ed                          [facility] PT anatomy, purpose/benefits/edu 10'                                      Biofeedback use  SZ   SZ        Bike/postue/PF   10' 10'  10' 10'                   Ther Ex             Quick flicks  89Q7"           Prolong hold  10x5"           PF/TA  10x3" 10x3" 10x3"         PF/TA/AD's  10x3" 10x3"          PF/TA/AB's   10x3"          Supine u/l LE lift/PF   10x3"          Bridge/PF   10x3" 5x3"         Bridge/PF/u/l LE lift   10x3" 5x3"         Sitting (roll use) PF   10x3" 10x3" no roll needed         Sitting PF/TA/AD's    10x3"         Sitting PF/TA/AB's    10x3"         Sitting PF/u/l TKE    10x3"         Sit to stand/PF     5x5" Rev  5"       Standing mini squat     5x5" Rev  5"       Standing u/l stance/PF     5x5" Rev  5"       Standing FF lunges/PF     5x5" Rev  5"       Leg press/PF      L2 10x2 5" ea L2 10x2 5" ea      Apollo B TKE/PF      L1 10x2 L2 10x2      Apollo B HS curls/PF      L3 10x2 L3 10x2      Happy Baby PF stretch      5x20" ea 5x20" coral                                Ther Activity             HEP review next 5' 5' 5' 5' 5' 5'      Breathing edu next 5'  5'   5'      Postural edu/correction   5'   5'       Gait Training                                       Modalities

## 2021-06-17 ENCOUNTER — APPOINTMENT (OUTPATIENT)
Dept: PHYSICAL THERAPY | Facility: CLINIC | Age: 61
End: 2021-06-17
Payer: COMMERCIAL

## 2021-06-24 ENCOUNTER — APPOINTMENT (OUTPATIENT)
Dept: PHYSICAL THERAPY | Facility: CLINIC | Age: 61
End: 2021-06-24
Payer: COMMERCIAL

## 2022-03-10 ENCOUNTER — TELEPHONE (OUTPATIENT)
Dept: OBGYN CLINIC | Facility: CLINIC | Age: 62
End: 2022-03-10

## 2022-03-10 DIAGNOSIS — Z79.890 HORMONE REPLACEMENT THERAPY (HRT): ICD-10-CM

## 2022-03-10 NOTE — TELEPHONE ENCOUNTER
Leonarda Rosario has [facility] scheduled for 05/31/22  She is requesting 90 day Estradiol patch renewal to [facility] pharmacy,[place]  Sent message to Dr Eli Marley

## 2022-03-11 NOTE — TELEPHONE ENCOUNTER
Informed pt her Rx request has been processed  Pt informed she received a text from [facility] informing she needs an alternative  Pt will contact pharmacy, advised if Estradiol patch is not covered to contact her insurance for covered alternative

## 2022-05-31 ENCOUNTER — ANNUAL EXAM (OUTPATIENT)
Dept: OBGYN CLINIC | Facility: CLINIC | Age: 62
End: 2022-05-31
Payer: COMMERCIAL

## 2022-05-31 VITALS
WEIGHT: 117 LBS | HEIGHT: 64 IN | BODY MASS INDEX: 19.97 KG/M2 | SYSTOLIC BLOOD PRESSURE: 110 MMHG | DIASTOLIC BLOOD PRESSURE: 60 MMHG

## 2022-05-31 DIAGNOSIS — Z12.11 SCREENING FOR COLON CANCER: ICD-10-CM

## 2022-05-31 DIAGNOSIS — Z12.31 ENCOUNTER FOR SCREENING MAMMOGRAM FOR MALIGNANT NEOPLASM OF BREAST: ICD-10-CM

## 2022-05-31 DIAGNOSIS — Z79.890 HORMONE REPLACEMENT THERAPY (HRT): ICD-10-CM

## 2022-05-31 DIAGNOSIS — Z01.419 ENCOUNTER FOR ANNUAL ROUTINE GYNECOLOGICAL EXAMINATION: Primary | ICD-10-CM

## 2022-05-31 PROCEDURE — S0612 ANNUAL GYNECOLOGICAL EXAMINA: HCPCS | Performed by: OBSTETRICS & GYNECOLOGY
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
## 2022-05-31 NOTE — ASSESSMENT & PLAN NOTE
Has been on HRT since hysterectomy, BSO 1991  Currently on lowest patch - 0 025mg/24hours weekly  Attempted to stop this year but had headaches and so restarted  Aware of risks and wishes to continue  May try to wean again  Refill to pharmacy

## 2022-06-03 ENCOUNTER — TELEPHONE (OUTPATIENT)
Dept: OBGYN CLINIC | Facility: CLINIC | Age: 62
End: 2022-06-03

## 2022-06-03 NOTE — TELEPHONE ENCOUNTER
Received fax from [facility] informing Estradiol patch 0 025 mg is not covered under pt's insurance plan and requesting an alternative     Spoke with pt and she informed she uses [facility] to [facility] her prescription and to disregard [facility] request

## 2022-07-14 ENCOUNTER — OFFICE VISIT (OUTPATIENT)
Dept: PAIN MEDICINE | Facility: CLINIC | Age: 62
End: 2022-07-14
Payer: COMMERCIAL

## 2022-07-14 ENCOUNTER — TELEPHONE (OUTPATIENT)
Dept: PAIN MEDICINE | Facility: CLINIC | Age: 62
End: 2022-07-14

## 2022-07-14 VITALS
SYSTOLIC BLOOD PRESSURE: 90 MMHG | DIASTOLIC BLOOD PRESSURE: 70 MMHG | HEIGHT: 64 IN | BODY MASS INDEX: 20.14 KG/M2 | TEMPERATURE: 97.5 F | WEIGHT: 118 LBS

## 2022-07-14 DIAGNOSIS — M79.18 MYOFASCIAL PAIN SYNDROME: Primary | ICD-10-CM

## 2022-07-14 DIAGNOSIS — G89.29 CHRONIC BILATERAL LOW BACK PAIN, UNSPECIFIED WHETHER SCIATICA PRESENT: ICD-10-CM

## 2022-07-14 DIAGNOSIS — M54.50 CHRONIC BILATERAL LOW BACK PAIN, UNSPECIFIED WHETHER SCIATICA PRESENT: ICD-10-CM

## 2022-07-14 DIAGNOSIS — G89.4 CHRONIC PAIN SYNDROME: ICD-10-CM

## 2022-07-14 DIAGNOSIS — M54.9 MID BACK PAIN: ICD-10-CM

## 2022-07-14 DIAGNOSIS — M48.061 LUMBAR FORAMINAL STENOSIS: ICD-10-CM

## 2022-07-14 DIAGNOSIS — M51.16 INTERVERTEBRAL DISC DISORDER WITH RADICULOPATHY OF LUMBAR REGION: ICD-10-CM

## 2022-07-14 PROCEDURE — 99214 OFFICE O/P EST MOD 30 MIN: CPT | Performed by: NURSE PRACTITIONER

## 2022-07-14 RX ORDER — DULOXETIN HYDROCHLORIDE 30 MG/1
30 CAPSULE, DELAYED RELEASE ORAL DAILY
Qty: 30 CAPSULE | Refills: 0 | Status: SHIPPED | OUTPATIENT
Start: 2022-07-14 | End: 2022-07-25

## 2022-07-14 NOTE — LETTER
Date: 7/14/2022    To whom it may concern: This is to certify that iGovanni Robbins has been under my care for the following diagnosis:  Chronic pain syndrome secondary to low back pain, lumbar intervertebral disc disorder with radiculopathy, and transverse myelitis  I feel that Giovanni Robbins is unable to serve on [address] [place] [address] Duty at this time for the above mentioned medical reasons  Her chronic back and leg pain does not allow her to sit for extended periods of time              Sincerely,  JANKI Short
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
## 2022-07-14 NOTE — TELEPHONE ENCOUNTER
S/w Pt to let her know that Dank Mason wrote up her jury duty letter that the Pt requested    Pt states that she will be right over to pick it up    Letter in the  office

## 2022-07-21 ENCOUNTER — HOSPITAL ENCOUNTER (OUTPATIENT)
Dept: RADIOLOGY | Facility: CLINIC | Age: 62
Discharge: HOME | End: 2022-07-21
Payer: COMMERCIAL

## 2022-07-21 ENCOUNTER — HOSPITAL ENCOUNTER (OUTPATIENT)
Dept: RADIOLOGY | Facility: CLINIC | Age: 62
Discharge: HOME | End: 2022-07-21
Admitting: ANESTHESIOLOGY
Payer: COMMERCIAL

## 2022-07-21 VITALS
OXYGEN SATURATION: 97 % | RESPIRATION RATE: 18 BRPM | HEART RATE: 57 BPM | TEMPERATURE: 97.7 F | SYSTOLIC BLOOD PRESSURE: 125 MMHG | DIASTOLIC BLOOD PRESSURE: 63 MMHG

## 2022-07-21 VITALS
OXYGEN SATURATION: 95 % | SYSTOLIC BLOOD PRESSURE: 121 MMHG | DIASTOLIC BLOOD PRESSURE: 71 MMHG | RESPIRATION RATE: 18 BRPM | HEART RATE: 83 BPM

## 2022-07-21 DIAGNOSIS — M47.816 LUMBAR SPONDYLOSIS: ICD-10-CM

## 2022-07-21 DIAGNOSIS — M51.16 INTERVERTEBRAL DISC DISORDER WITH RADICULOPATHY OF LUMBAR REGION: ICD-10-CM

## 2022-07-21 PROCEDURE — NC001 PR NO CHARGE: Performed by: ANESTHESIOLOGY

## 2022-07-21 PROCEDURE — 64494 INJ PARAVERT F JNT L/S 2 LEV: CPT | Performed by: ANESTHESIOLOGY

## 2022-07-21 PROCEDURE — 64493 INJ PARAVERT F JNT L/S 1 LEV: CPT | Performed by: ANESTHESIOLOGY

## 2022-07-21 RX ORDER — BUPIVACAINE HCL/PF 2.5 MG/ML
10 VIAL (ML) INJECTION ONCE
Status: DISCONTINUED | OUTPATIENT
Start: 2022-07-21 | End: 2022-07-21

## 2022-07-21 RX ORDER — BUPIVACAINE HCL/PF 2.5 MG/ML
10 VIAL (ML) INJECTION ONCE
Status: COMPLETED | OUTPATIENT
Start: 2022-07-21 | End: 2022-07-21

## 2022-07-21 RX ADMIN — BUPIVACAINE HYDROCHLORIDE 8 ML: 2.5 INJECTION, SOLUTION EPIDURAL; INFILTRATION; INTRACAUDAL at 10:12
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
## 2022-07-21 NOTE — DISCHARGE INSTRUCTIONS

## 2022-07-25 ENCOUNTER — HOSPITAL ENCOUNTER (OUTPATIENT)
Dept: RADIOLOGY | Facility: CLINIC | Age: 62
Discharge: HOME | End: 2022-07-25
Admitting: ANESTHESIOLOGY
Payer: COMMERCIAL

## 2022-07-25 VITALS
HEART RATE: 72 BPM | DIASTOLIC BLOOD PRESSURE: 76 MMHG | TEMPERATURE: 97.7 F | RESPIRATION RATE: 18 BRPM | OXYGEN SATURATION: 97 % | SYSTOLIC BLOOD PRESSURE: 132 MMHG

## 2022-07-25 PROCEDURE — A9585 GADOBUTROL INJECTION: HCPCS | Performed by: ANESTHESIOLOGY

## 2022-07-25 PROCEDURE — 64483 NJX AA&/STRD TFRM EPI L/S 1: CPT | Performed by: ANESTHESIOLOGY

## 2022-07-25 PROCEDURE — 64484 NJX AA&/STRD TFRM EPI L/S EA: CPT | Performed by: ANESTHESIOLOGY

## 2022-07-25 RX ORDER — PAPAVERINE HCL 150 MG
15 CAPSULE, EXTENDED RELEASE ORAL ONCE
Status: COMPLETED | OUTPATIENT
Start: 2022-07-25 | End: 2022-07-25

## 2022-07-25 RX ADMIN — DEXAMETHASONE SODIUM PHOSPHATE 15 MG: 10 INJECTION, SOLUTION INTRAMUSCULAR; INTRAVENOUS at 13:44

## 2022-07-25 RX ADMIN — GADOBUTROL 2 ML: 604.72 INJECTION INTRAVENOUS at 13:44

## 2022-07-25 NOTE — DISCHARGE INSTRUCTIONS
Epidural Steroid Injection   WHAT YOU NEED TO KNOW:   An epidural steroid injection (JESSI) is a procedure to inject steroid medicine into the epidural space  The epidural space is between your spinal cord and vertebrae  Steroids reduce inflammation and fluid buildup in your spine that may be causing pain  You may be given pain medicine along with the steroids  ACTIVITY  Do not drive or operate machinery today  No strenuous activity today - bending, lifting, etc   You may resume normal activites starting tomorrow - start slowly and as tolerated  You may shower today, but no tub baths or hot tubs  You may have numbness for several hours from the local anesthetic  Please use caution and common sense, especially with weight-bearing activities  CARE OF THE INJECTION SITE  If you have soreness or pain, apply ice to the area today (20 minutes on/20 minutes off)  Starting tomorrow, you may use warm, moist heat or ice if needed  You may have an increase or change in your discomfort for 36-48 hours after your treatment  Apply ice and continue with any pain medication you have been prescribed  Notify [facility] if you have any of the following: redness, drainage, swelling, headache, stiff neck or fever above 100°F     SPECIAL INSTRUCTIONS  Our office will contact you in approximately 7 days for a progress report  MEDICATIONS  Continue to take all routine medications  Our office may have instructed you to hold some medications  As no general anesthesia was used in today's procedure, you should not experience any side effects related to anesthesia  If you have a problem specifically related to your procedure, please call our office at [phone]  Problems not related to your procedure should be directed to your primary care physician
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
## 2022-08-01 ENCOUNTER — TELEPHONE (OUTPATIENT)
Dept: PAIN MEDICINE | Facility: CLINIC | Age: 62
End: 2022-08-01

## 2022-08-04 ENCOUNTER — HOSPITAL ENCOUNTER (OUTPATIENT)
Dept: RADIOLOGY | Facility: CLINIC | Age: 62
Discharge: HOME | End: 2022-08-04
Admitting: ANESTHESIOLOGY
Payer: COMMERCIAL

## 2022-08-04 VITALS
HEART RATE: 78 BPM | OXYGEN SATURATION: 95 % | SYSTOLIC BLOOD PRESSURE: 116 MMHG | RESPIRATION RATE: 20 BRPM | DIASTOLIC BLOOD PRESSURE: 66 MMHG | TEMPERATURE: 97.9 F

## 2022-08-04 DIAGNOSIS — M47.816 LUMBAR SPONDYLOSIS: ICD-10-CM

## 2022-08-04 PROCEDURE — 64494 INJ PARAVERT F JNT L/S 2 LEV: CPT | Performed by: ANESTHESIOLOGY

## 2022-08-04 PROCEDURE — 64493 INJ PARAVERT F JNT L/S 1 LEV: CPT | Performed by: ANESTHESIOLOGY

## 2022-08-04 RX ORDER — BUPIVACAINE HYDROCHLORIDE 7.5 MG/ML
10 INJECTION, SOLUTION EPIDURAL; RETROBULBAR ONCE
Status: COMPLETED | OUTPATIENT
Start: 2022-08-04 | End: 2022-08-04

## 2022-08-04 RX ADMIN — BUPIVACAINE HYDROCHLORIDE 7 ML: 7.5 INJECTION, SOLUTION EPIDURAL; RETROBULBAR at 09:52

## 2022-08-04 NOTE — DISCHARGE INSTRUCTIONS
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
## 2022-08-08 ENCOUNTER — TELEPHONE (OUTPATIENT)
Dept: PAIN MEDICINE | Facility: CLINIC | Age: 62
End: 2022-08-08

## 2022-08-08 NOTE — TELEPHONE ENCOUNTER
Patient   [phone]  Dr Alida Woo    Patient is calling in to schedule her next procedure  She said that she dropped her pain diaryy off on Friday 8/5/22

## 2022-08-09 NOTE — TELEPHONE ENCOUNTER
PRE OP INSTRUCTIONS:     -If you are on prescription blood thinners, you may have to hold the medication for several days before the procedure  Please call the office to    discuss medication holds at [phone]   -Do not eat or drink ONE HOUR prior to your procedure  If you are diabetic, may follow regular breakfast/lunch schedule and take usual    diabetic medications   -Lumbar( low back) procedure, please wear comfortable slacks/pants   -Cervical (neck) procedure, please wear a shirt/blouse that is easy to remove   -A  is required to take you home form your procedure   -Continue all to take prescribed medication the day of your procedure, including blood pressure medications   -If you are prescribe antibiotics, have an active infection or have an open wound, please contact the office at [phone]   -Please refrain from any vaccinations two weeks before and two weeks after injection   -Insurance authorization received in not a guarantee of payment per your insurance company's authorization disclaimer and it is    your responsibility to verify your benefits  -If you have any questions about the instructions, please call me at [phone]    Hold medication  x _ full days prior, last dose on _  Patient advised to hold medication from Date till their appointment at that time instructions to restart will be given  Patient stated verbal understanding  Aware that nursing will call her to review hold dates as well

## 2022-08-18 NOTE — TELEPHONE ENCOUNTER
Patient states she's starting to experience left leg pain especially at night  She is requesting to speak to Dr Shawanda Ray & his nurses about her status   Please reach out to her at # [phone], thx

## 2022-08-18 NOTE — TELEPHONE ENCOUNTER
S/w pt, stated that her pain in her L lateral lower leg into her ankle has returned x 2 days with no obvious cause after steroid injection 3 weeks ago  Per pt, she continues to have + relief in her L upper leg after her marie  Pt confirmed that she is scheduled for a L L3 - L5 RFA on 9/1  Pt understands that rfa is not expected to address her leg pain  Advised pt, as her pain has been for 2 days, it is possible to be "bad days" and may improve with rest  Advised pt, ok to use rest, ice / heat, medications as directed or prescribed  Cb early next week if no improvement  Pt stated that her pain is worst with resting and she cannot tolerate medications  Pt questioned another injection  Pt also mentioned calling Dr Maureen Enamorado for tpi in her L leg  Per pt, it is unlikely that she will call early next week as at that point - it will be close to her RFA appt  Advised pt, the writer will d/w DG and cb if there is anything different or additional  Pt verbalized understanding and appreciation

## 2022-09-01 ENCOUNTER — TELEPHONE (OUTPATIENT)
Dept: PAIN MEDICINE | Facility: CLINIC | Age: 62
End: 2022-09-01

## 2022-09-01 ENCOUNTER — HOSPITAL ENCOUNTER (OUTPATIENT)
Dept: RADIOLOGY | Facility: CLINIC | Age: 62
Discharge: HOME | End: 2022-09-01
Payer: COMMERCIAL

## 2022-09-01 VITALS
HEART RATE: 84 BPM | OXYGEN SATURATION: 98 % | TEMPERATURE: 97.3 F | SYSTOLIC BLOOD PRESSURE: 129 MMHG | RESPIRATION RATE: 18 BRPM | DIASTOLIC BLOOD PRESSURE: 71 MMHG

## 2022-09-01 DIAGNOSIS — M54.50 LOW BACK PAIN: ICD-10-CM

## 2022-09-01 DIAGNOSIS — M47.816 LUMBAR SPONDYLOSIS: ICD-10-CM

## 2022-09-01 PROCEDURE — 64635 DESTROY LUMB/SAC FACET JNT: CPT | Performed by: ANESTHESIOLOGY

## 2022-09-01 PROCEDURE — 64636 DESTROY L/S FACET JNT ADDL: CPT | Performed by: ANESTHESIOLOGY

## 2022-09-01 RX ADMIN — LIDOCAINE HYDROCHLORIDE 5 ML: 20 INJECTION, SOLUTION EPIDURAL; INFILTRATION; INTRACAUDAL at 13:15

## 2022-09-01 NOTE — DISCHARGE INSTRUCTIONS
Medial Branch Radiofrequency Ablation     WHAT YOU NEED TO KNOW:   Medial branch radiofrequency ablation (RFA) is a procedure used to treat facet joint pain in your neck, mid back, or lower back  Facet joints are found at the back of each vertebra  A needle electrode is used to send electrical currents to the nerves in your facet joint  The electrical currents create heat that damages the nerve so it cannot send pain signals  ACTIVITY  Do not drive or operate machinery today  No strenuous activity today - bending, lifting, etc   You may shower today, but do not sit in a tub of water  Resume normal activities tomorrow as tolerated  CARE OF THE INJECTION SITE  If you have soreness or pain, apply ice to the area today (20 minutes on/20 minutes off)  Starting tomorrow, you may use warm, moist heat or ice if needed  [facility] if you have any of the following: redness, drainage, swelling, or fever above 100°F     SPECIAL INSTRUCTIONS  Our office will call you tomorrow for a progress report and make an appointment for a follow up visit in 4 weeks  If you feel a sunburn-like sensation in the area of your procedure, call our office  MEDICATIONS  Continue to take all routine medications  Our office may have instructed you to hold some medications  As no general anesthesia was used in today's procedure, you should not experience any side effects related to anesthesia  If you have a problem specifically related to your procedure, please call our office at [phone]  Problems not related to your procedure should be directed to your primary care physician
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
## 2022-09-01 NOTE — TELEPHONE ENCOUNTER
Patient is S/P a Left L3-L5 RFA c/ SL on 9/2/22  Next RFA is scheduled for 9/15/22  Next OVS scheduled for 10/3/22  Please call on 9/2/22 post RFA   Thanks

## 2022-09-02 NOTE — TELEPHONE ENCOUNTER
S/w pt, reports mild tolerable soreness at procedure site  Denies s/sx infection, aware 4-6 wks for full benefit  Confirmed upcoming appts, advised to cb if any questions or concerns in the meantime  Pt verbalized understanding and appreciation

## 2022-09-15 ENCOUNTER — TELEPHONE (OUTPATIENT)
Dept: PAIN MEDICINE | Facility: CLINIC | Age: 62
End: 2022-09-15

## 2022-09-15 ENCOUNTER — HOSPITAL ENCOUNTER (OUTPATIENT)
Dept: RADIOLOGY | Facility: CLINIC | Age: 62
Discharge: HOME | End: 2022-09-15
Payer: COMMERCIAL

## 2022-09-15 VITALS
OXYGEN SATURATION: 96 % | HEART RATE: 85 BPM | TEMPERATURE: 97.9 F | RESPIRATION RATE: 20 BRPM | DIASTOLIC BLOOD PRESSURE: 74 MMHG | SYSTOLIC BLOOD PRESSURE: 146 MMHG

## 2022-09-15 DIAGNOSIS — M47.816 LUMBAR SPONDYLOSIS: ICD-10-CM

## 2022-09-15 DIAGNOSIS — M54.50 LOW BACK PAIN: ICD-10-CM

## 2022-09-15 PROCEDURE — 64636 DESTROY L/S FACET JNT ADDL: CPT | Performed by: ANESTHESIOLOGY

## 2022-09-15 PROCEDURE — 64635 DESTROY LUMB/SAC FACET JNT: CPT | Performed by: ANESTHESIOLOGY

## 2022-09-15 RX ADMIN — LIDOCAINE HYDROCHLORIDE 5 ML: 20 INJECTION, SOLUTION EPIDURAL; INFILTRATION; INTRACAUDAL at 10:22

## 2022-09-15 NOTE — TELEPHONE ENCOUNTER
Patient is S/P a Right L3-L5 RFA c/ SL on 9/15/22  Next OVS scheduled for 10/3/22  Please call on 9/16/22 post RFA   Thanks

## 2022-09-15 NOTE — DISCHARGE INSTRUCTIONS
Medial Branch Radiofrequency Ablation     WHAT YOU NEED TO KNOW:   Medial branch radiofrequency ablation (RFA) is a procedure used to treat facet joint pain in your neck, mid back, or lower back  Facet joints are found at the back of each vertebra  A needle electrode is used to send electrical currents to the nerves in your facet joint  The electrical currents create heat that damages the nerve so it cannot send pain signals  ACTIVITY  Do not drive or operate machinery today  No strenuous activity today - bending, lifting, etc   You may shower today, but do not sit in a tub of water  Resume normal activities tomorrow as tolerated  CARE OF THE INJECTION SITE  If you have soreness or pain, apply ice to the area today (20 minutes on/20 minutes off)  Starting tomorrow, you may use warm, moist heat or ice if needed  Notify [facility] if you have any of the following: redness, drainage, swelling, or fever above 100°F     SPECIAL INSTRUCTIONS  Our office will call you tomorrow for a progress report and make an appointment for a follow up visit in 4 weeks  If you feel a sunburn-like sensation in the area of your procedure, call our office  MEDICATIONS  Continue to take all routine medications  Our office may have instructed you to hold some medications  As no general anesthesia was used in today's procedure, you should not experience any side effects related to anesthesia  If you have a problem specifically related to your procedure, please call our office at [phone]  Problems not related to your procedure should be directed to your primary care physician
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
## 2022-09-16 NOTE — TELEPHONE ENCOUNTER
S/w pt, stated that she has some improvement s/p rfa  Advised pt to allow 2-6 weeks for the full effect  Cb if s/s infection present: redness, drainage, swelling at the site or fever 100+, or if a sunburn like sensation in the area of the procedure presents  Ok to continue w/ rest, ice / heat, medication as prescribed / directed  Cb sooner if questions / issues arise   Pt verbalized understanding and appreciation and confirmed 10/3 fu ov w/ NM

## 2022-10-03 ENCOUNTER — OFFICE VISIT (OUTPATIENT)
Dept: PAIN MEDICINE | Facility: CLINIC | Age: 62
End: 2022-10-03
Payer: COMMERCIAL

## 2022-10-03 ENCOUNTER — APPOINTMENT (OUTPATIENT)
Dept: RADIOLOGY | Facility: CLINIC | Age: 62
End: 2022-10-03
Payer: COMMERCIAL

## 2022-10-03 VITALS
HEIGHT: 64 IN | TEMPERATURE: 97.9 F | SYSTOLIC BLOOD PRESSURE: 110 MMHG | BODY MASS INDEX: 20.14 KG/M2 | WEIGHT: 118 LBS | DIASTOLIC BLOOD PRESSURE: 70 MMHG

## 2022-10-03 DIAGNOSIS — M96.1 LUMBAR POST-LAMINECTOMY SYNDROME: ICD-10-CM

## 2022-10-03 DIAGNOSIS — G89.29 CHRONIC BILATERAL LOW BACK PAIN, UNSPECIFIED WHETHER SCIATICA PRESENT: ICD-10-CM

## 2022-10-03 DIAGNOSIS — M51.16 INTERVERTEBRAL DISC DISORDER WITH RADICULOPATHY OF LUMBAR REGION: ICD-10-CM

## 2022-10-03 DIAGNOSIS — M54.2 NECK PAIN: ICD-10-CM

## 2022-10-03 DIAGNOSIS — M54.50 CHRONIC BILATERAL LOW BACK PAIN, UNSPECIFIED WHETHER SCIATICA PRESENT: ICD-10-CM

## 2022-10-03 DIAGNOSIS — M54.2 NECK PAIN: Primary | ICD-10-CM

## 2022-10-03 DIAGNOSIS — G89.4 CHRONIC PAIN SYNDROME: ICD-10-CM

## 2022-10-03 DIAGNOSIS — M47.816 LUMBAR SPONDYLOSIS: ICD-10-CM

## 2022-10-03 PROCEDURE — 72050 X-RAY EXAM NECK SPINE 4/5VWS: CPT

## 2022-10-03 PROCEDURE — 99214 OFFICE O/P EST MOD 30 MIN: CPT | Performed by: NURSE PRACTITIONER

## 2022-10-03 RX ORDER — GABAPENTIN 250 MG/5ML
300 SOLUTION ORAL 4 TIMES DAILY
COMMUNITY
End: 2022-10-03

## 2022-10-03 RX ORDER — GABAPENTIN 300 MG/1
300 CAPSULE ORAL 4 TIMES DAILY
COMMUNITY
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
## 2022-10-11 NOTE — TELEPHONE ENCOUNTER
Straightening of the cervical lordosis may be related to patient positioning or muscle spasm      Disc degeneration and facet hypertrophy

## 2022-10-11 NOTE — TELEPHONE ENCOUNTER
Caller: Patient    Doctor: Ida Jeter    Reason for call: Doc Salvatoreius results     Call back#: [phone]

## 2022-10-11 NOTE — TELEPHONE ENCOUNTER
RN s/w pt regarding previous  Per pt she is wondering if she is a candidate for what she and NM were discussing in her sovs, a cervical RFA? Per pt she does not know if this is what would be ordered for the pain that she is describing  If this is what can be done she would love to have this complete before the new year  --please advise thank you--  Cervical RFA?

## 2022-10-13 ENCOUNTER — TELEPHONE (OUTPATIENT)
Dept: PAIN MEDICINE | Facility: CLINIC | Age: 62
End: 2022-10-13

## 2022-10-13 DIAGNOSIS — M51.16 INTERVERTEBRAL DISC DISORDER WITH RADICULOPATHY OF LUMBAR REGION: Primary | ICD-10-CM

## 2022-10-13 DIAGNOSIS — M47.812 ARTHROPATHY OF CERVICAL FACET JOINT: Primary | ICD-10-CM

## 2022-10-13 NOTE — PROGRESS NOTES
Patient called with ongoing low back pain  Had lumbar RFA in September with no relief  Last Mri lumbar spine was from 2020    At this time, I will order updated imaging, and possible surgical referral

## 2022-10-13 NOTE — TELEPHONE ENCOUNTER
S/w pt, advised of above  Anticipate a cb from [address] to schedule procedure as discussed  Provided contact info for Central scheduling  Pt verbalized understanding and appreciation  Will proceed as discussed

## 2022-10-13 NOTE — TELEPHONE ENCOUNTER
PRE OP INSTRUCTIONS:     -If you are on prescription blood thinners, you may have to hold the medication for several days before the procedure  Please call the office to    discuss medication holds at [phone]   -Do not eat or drink ONE HOUR prior to your procedure  If you are diabetic, may follow regular breakfast/lunch schedule and take usual    diabetic medications   -Lumbar( low back) procedure, please wear comfortable slacks/pants   -Cervical (neck) procedure, please wear a shirt/blouse that is easy to remove   -A  is required to take you home form your procedure   -Continue all to take prescribed medication the day of your procedure, including blood pressure medications   -If you are prescribe antibiotics, have an active infection or have an open wound, please contact the office at [phone]   -Please refrain from any vaccinations two weeks before and two weeks after injection   -Insurance authorization received in not a guarantee of payment per your insurance company's authorization disclaimer and it is    your responsibility to verify your benefits  -If you have any questions about the instructions, please call me at [phone]    Hold medication  x _ full days prior, last dose on _  Patient advised to hold medication from Date till their appointment at that time instructions to restart will be given  Patient stated verbal understanding  Aware that nursing will call her to review hold dates as well

## 2022-10-13 NOTE — TELEPHONE ENCOUNTER
----- Message from Raquel Tsai [address] [place] sent at 10/13/2022  7:44 AM EDT -----  Please let patient know that xray c spine showed straightening of the normal curve of her neck  This occurs from muscle spasms  She also has some mild arthritis and degenerative disc disease C4-C7      If she is interested, I can order her for a b/l C4-C6 cervical MBB

## 2022-10-13 NOTE — TELEPHONE ENCOUNTER
S/w pt, advised of above  Pt verbalized agreement  Pt asked the writer to make NM aware that her low back pain is not improving s/t her recent procedure with SL  Pt stated that an mri was discussed  Pt stated that the pain starts in the thoracic region  Advised pt, will make nm aware  Anticipate a cb from [address] [place] [address] to schedule mbb  The writer will cb to discuss mri  Pt verbalized understanding and appreciation

## 2022-10-17 ENCOUNTER — HOSPITAL ENCOUNTER (OUTPATIENT)
Dept: RADIOLOGY | Facility: CLINIC | Age: 62
Discharge: HOME | End: 2022-10-17
Payer: COMMERCIAL

## 2022-10-17 VITALS
HEART RATE: 80 BPM | TEMPERATURE: 97.3 F | OXYGEN SATURATION: 96 % | SYSTOLIC BLOOD PRESSURE: 120 MMHG | DIASTOLIC BLOOD PRESSURE: 72 MMHG | RESPIRATION RATE: 20 BRPM

## 2022-10-17 DIAGNOSIS — M47.812 ARTHROPATHY OF CERVICAL FACET JOINT: ICD-10-CM

## 2022-10-17 PROCEDURE — 64490 INJ PARAVERT F JNT C/T 1 LEV: CPT | Performed by: ANESTHESIOLOGY

## 2022-10-17 PROCEDURE — 64491 INJ PARAVERT F JNT C/T 2 LEV: CPT | Performed by: ANESTHESIOLOGY

## 2022-10-17 RX ORDER — BUPIVACAINE HCL/PF 2.5 MG/ML
10 VIAL (ML) INJECTION ONCE
Status: COMPLETED | OUTPATIENT
Start: 2022-10-17 | End: 2022-10-17

## 2022-10-17 RX ADMIN — BUPIVACAINE HYDROCHLORIDE 6 ML: 2.5 INJECTION, SOLUTION EPIDURAL; INFILTRATION; INTRACAUDAL at 11:06
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
## 2022-10-17 NOTE — DISCHARGE INSTRUCTIONS

## 2022-10-27 ENCOUNTER — HOSPITAL ENCOUNTER (OUTPATIENT)
Dept: RADIOLOGY | Facility: CLINIC | Age: 62
Discharge: HOME | End: 2022-10-27
Payer: COMMERCIAL

## 2022-10-27 VITALS
RESPIRATION RATE: 20 BRPM | DIASTOLIC BLOOD PRESSURE: 81 MMHG | TEMPERATURE: 97.5 F | HEART RATE: 75 BPM | OXYGEN SATURATION: 98 % | SYSTOLIC BLOOD PRESSURE: 166 MMHG

## 2022-10-27 DIAGNOSIS — M54.2 CERVICAL PAIN (NECK): ICD-10-CM

## 2022-10-27 DIAGNOSIS — M47.812 CERVICAL SPONDYLOSIS: ICD-10-CM

## 2022-10-27 PROCEDURE — 64490 INJ PARAVERT F JNT C/T 1 LEV: CPT | Performed by: ANESTHESIOLOGY

## 2022-10-27 PROCEDURE — 64491 INJ PARAVERT F JNT C/T 2 LEV: CPT | Performed by: ANESTHESIOLOGY

## 2022-10-27 RX ORDER — BUPIVACAINE HYDROCHLORIDE 7.5 MG/ML
10 INJECTION, SOLUTION EPIDURAL; RETROBULBAR ONCE
Status: COMPLETED | OUTPATIENT
Start: 2022-10-27 | End: 2022-10-27

## 2022-10-27 RX ADMIN — BUPIVACAINE HYDROCHLORIDE 7 ML: 7.5 INJECTION, SOLUTION EPIDURAL; RETROBULBAR at 09:26

## 2022-10-27 NOTE — DISCHARGE INSTRUCTIONS
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
## 2022-11-03 ENCOUNTER — HOSPITAL ENCOUNTER (OUTPATIENT)
Dept: RADIOLOGY | Facility: HOSPITAL | Age: 62
Discharge: HOME | End: 2022-11-03

## 2022-11-03 DIAGNOSIS — M51.16 INTERVERTEBRAL DISC DISORDER WITH RADICULOPATHY OF LUMBAR REGION: ICD-10-CM

## 2022-11-08 ENCOUNTER — TELEPHONE (OUTPATIENT)
Dept: PAIN MEDICINE | Facility: CLINIC | Age: 62
End: 2022-11-08

## 2022-11-08 DIAGNOSIS — M54.16 LUMBAR RADICULITIS: ICD-10-CM

## 2022-11-08 DIAGNOSIS — G89.29 CHRONIC BILATERAL LOW BACK PAIN WITHOUT SCIATICA: Primary | ICD-10-CM

## 2022-11-08 DIAGNOSIS — M51.26 HERNIATED LUMBAR INTERVERTEBRAL DISC: ICD-10-CM

## 2022-11-08 DIAGNOSIS — M54.50 CHRONIC BILATERAL LOW BACK PAIN WITHOUT SCIATICA: Primary | ICD-10-CM

## 2022-11-08 NOTE — TELEPHONE ENCOUNTER
We could try an L3-4 LESI and that would address both areas of her pain and then go from there  The L1-2 changes and L3-4 changes her mid to higher levels of her lumbar spine  Unless she has pain in her mid thoracic or bra line level, I do not see a need for a thoracic MRI  I put the order in for the L3-4 LESI with Dr Keiko Liriano

## 2022-11-08 NOTE — TELEPHONE ENCOUNTER
S/w pt, advised of above  Confirmed pain is below her rib cage / ~ 2" below her bra line  Pt stated that she does have pain at her bra line as well but that is not the significant pain she is having now  Pt verbalized agreement with ALYSON as discussed and denied blood thinning medication  Advised pt, the writer will d/w DG  Anticipate a cb from [address] [place] [address] to schedule procedure as discussed  Pt verbalized understanding and appreciation

## 2022-11-08 NOTE — TELEPHONE ENCOUNTER
S/w pt, advised of above  Per pt, she has pain low in her thoracic spine / high in her lumbar spine and is questioning an mri of her thoracic spine or more elaboration on the comments in her mri report re: thoracic spine findings  Pt stated that she is not opposed to [facility] as the pain is significant  Advised pt, will d/w DG and cb to advise  Pt verbalized understanding and appreciation

## 2022-11-08 NOTE — TELEPHONE ENCOUNTER
----- Message from [address] sent at 11/7/2022  7:25 AM EST -----  Please let patient know MRI lumbar spine results: At L3-L4 there is a small left disc herniation that has increased in size since the last imaging  It is causing some mild narrowing about the nerves at that level  Would she be interested in trying an epidural at that level?   Please confirm what side pain is on

## 2022-11-09 NOTE — TELEPHONE ENCOUNTER
Patient called back and scheduled for Procedure    All pre procedure instructions were given to patient   Nothing to eat or drink for 1 hour prior  Loose fitting clothing   PATIENT INSTRUCTED TO STOP ALL NSAID'S 4 DAYS PRIOR TO PROCEDURE EXCEPT FOR IBUPROFEN IT CAN BE TAKEN UP TO 24 HOURS PRIOR TO PROCEDURE     DENIES ANTICOAG'S OR ASA   Denies Antibx   Needs    Patient instructed to contact our office if becomes sick or gets put on any ANTIBIOTIC or has any active infections   Refrain from any vaccines 2 weeks before & 2 weeks after  Insurance auth received but is not a guarantee of payment per your insurance company's authorization disclaimer and it is your responsibility to verify your benefits   COVID -19 screening complete

## 2022-11-10 ENCOUNTER — TELEPHONE (OUTPATIENT)
Dept: PAIN MEDICINE | Facility: CLINIC | Age: 62
End: 2022-11-10

## 2022-11-10 ENCOUNTER — HOSPITAL ENCOUNTER (OUTPATIENT)
Dept: RADIOLOGY | Facility: CLINIC | Age: 62
Discharge: HOME | End: 2022-11-10

## 2022-11-10 VITALS
TEMPERATURE: 97.2 F | RESPIRATION RATE: 20 BRPM | DIASTOLIC BLOOD PRESSURE: 82 MMHG | HEART RATE: 81 BPM | SYSTOLIC BLOOD PRESSURE: 121 MMHG | OXYGEN SATURATION: 98 %

## 2022-11-10 DIAGNOSIS — M47.812 CERVICAL SPONDYLOSIS: ICD-10-CM

## 2022-11-10 DIAGNOSIS — M54.2 CERVICAL PAIN (NECK): ICD-10-CM

## 2022-11-10 RX ADMIN — LIDOCAINE HYDROCHLORIDE 5 ML: 20 INJECTION, SOLUTION EPIDURAL; INFILTRATION; INTRACAUDAL at 13:12
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
## 2022-11-10 NOTE — TELEPHONE ENCOUNTER
Please call in am    Pt has LESI 11/17/22 at 920    Pt scheduled for Right C4-6 RFA on 11/27    Pt has 6 week f/u OVS 12/29 with 745 arrival

## 2022-11-10 NOTE — DISCHARGE INSTRUCTIONS
Medial Branch Radiofrequency Ablation     WHAT YOU NEED TO KNOW:   Medial branch radiofrequency ablation (RFA) is a procedure used to treat facet joint pain in your neck, mid back, or lower back  Facet joints are found at the back of each vertebra  A needle electrode is used to send electrical currents to the nerves in your facet joint  The electrical currents create heat that damages the nerve so it cannot send pain signals  ACTIVITY  Do not drive or operate machinery today  No strenuous activity today - bending, lifting, etc   You may shower today, but do not sit in a tub of water  Resume normal activities tomorrow as tolerated  CARE OF THE INJECTION SITE  If you have soreness or pain, apply ice to the area today (20 minutes on/20 minutes off)  Starting tomorrow, you may use warm, moist heat or ice if needed  Notify [facility] if you have any of the following: redness, drainage, swelling, or fever above 100°F     SPECIAL INSTRUCTIONS  Our office will call you tomorrow for a progress report and make an appointment for a follow up visit in 4 weeks  If you feel a sunburn-like sensation in the area of your procedure, call our office  MEDICATIONS  Continue to take all routine medications  Our office may have instructed you to hold some medications  As no general anesthesia was used in today's procedure, you should not experience any side effects related to anesthesia  If you have a problem specifically related to your procedure, please call our office at [phone]  Problems not related to your procedure should be directed to your primary care physician

## 2022-11-11 NOTE — TELEPHONE ENCOUNTER
S/w pt, stated that she has significant improvement in her neck pain s/p rfa of yesterday  Advised pt to allow 2-6 weeks for the full effect  Cb if s/s infection present: redness, drainage, swelling at the site or fever 100+, or if a sunburn like sensation in the area of the procedure presents  Ok to continue w/ rest, ice / heat, medication as prescribed / directed  Pt verbalized understanding and appreciation  Confirmed 11/27/22 rfa and 12/29 fu ov  Will cb sooner if questions / issues arise

## 2022-11-17 ENCOUNTER — HOSPITAL ENCOUNTER (OUTPATIENT)
Dept: RADIOLOGY | Facility: CLINIC | Age: 62
Discharge: HOME | End: 2022-11-17

## 2022-11-17 VITALS
RESPIRATION RATE: 20 BRPM | TEMPERATURE: 97.3 F | OXYGEN SATURATION: 92 % | DIASTOLIC BLOOD PRESSURE: 76 MMHG | HEART RATE: 87 BPM | SYSTOLIC BLOOD PRESSURE: 126 MMHG

## 2022-11-17 DIAGNOSIS — M54.50 CHRONIC BILATERAL LOW BACK PAIN WITHOUT SCIATICA: ICD-10-CM

## 2022-11-17 DIAGNOSIS — M51.26 HERNIATED LUMBAR INTERVERTEBRAL DISC: ICD-10-CM

## 2022-11-17 DIAGNOSIS — M54.16 LUMBAR RADICULITIS: ICD-10-CM

## 2022-11-17 DIAGNOSIS — G89.29 CHRONIC BILATERAL LOW BACK PAIN WITHOUT SCIATICA: ICD-10-CM

## 2022-11-17 RX ORDER — METHYLPREDNISOLONE ACETATE 80 MG/ML
80 INJECTION, SUSPENSION INTRA-ARTICULAR; INTRALESIONAL; INTRAMUSCULAR; PARENTERAL; SOFT TISSUE ONCE
Status: COMPLETED | OUTPATIENT
Start: 2022-11-17 | End: 2022-11-17

## 2022-11-17 RX ADMIN — IOHEXOL 1 ML: 300 INJECTION, SOLUTION INTRAVENOUS at 09:20

## 2022-11-17 RX ADMIN — METHYLPREDNISOLONE ACETATE 80 MG: 80 INJECTION, SUSPENSION INTRA-ARTICULAR; INTRALESIONAL; INTRAMUSCULAR; SOFT TISSUE at 09:20

## 2022-11-17 NOTE — DISCHARGE INSTRUCTIONS
Epidural Steroid Injection   WHAT YOU NEED TO KNOW:   An epidural steroid injection (JESSI) is a procedure to inject steroid medicine into the epidural space  The epidural space is between your spinal cord and vertebrae  Steroids reduce inflammation and fluid buildup in your spine that may be causing pain  You may be given pain medicine along with the steroids  ACTIVITY  Do not drive or operate machinery today  No strenuous activity today - bending, lifting, etc   You may resume normal activites starting tomorrow - start slowly and as tolerated  You may shower today, but no tub baths or hot tubs  You may have numbness for several hours from the local anesthetic  Please use caution and common sense, especially with weight-bearing activities  CARE OF THE INJECTION SITE  If you have soreness or pain, apply ice to the area today (20 minutes on/20 minutes off)  Starting tomorrow, you may use warm, moist heat or ice if needed  You may have an increase or change in your discomfort for 36-48 hours after your treatment  Apply ice and continue with any pain medication you have been prescribed  Notify the [facility] if you have any of the following: redness, drainage, swelling, headache, stiff neck or fever above 100°F     SPECIAL INSTRUCTIONS  Our office will contact you in approximately 7 days for a progress report  MEDICATIONS  Continue to take all routine medications  Our office may have instructed you to hold some medications  As no general anesthesia was used in today's procedure, you should not experience any side effects related to anesthesia  If you are diabetic, the steroids used in today's injection may temporarily increase your blood sugar levels after the first few days after your injection  Please keep a close eye on your sugars and alert the doctor who manages your diabetes if your sugars are significantly high from your baseline or you are symptomatic       If you have a problem specifically related to your procedure, please call our office at [phone]  Problems not related to your procedure should be directed to your primary care physician

## 2022-11-23 ENCOUNTER — TELEPHONE (OUTPATIENT)
Dept: PAIN MEDICINE | Facility: CLINIC | Age: 62
End: 2022-11-23

## 2022-11-28 ENCOUNTER — HOSPITAL ENCOUNTER (OUTPATIENT)
Dept: RADIOLOGY | Facility: CLINIC | Age: 62
Discharge: HOME | End: 2022-11-28

## 2022-11-28 ENCOUNTER — TELEPHONE (OUTPATIENT)
Dept: PAIN MEDICINE | Facility: CLINIC | Age: 62
End: 2022-11-28

## 2022-11-28 VITALS
SYSTOLIC BLOOD PRESSURE: 128 MMHG | OXYGEN SATURATION: 93 % | RESPIRATION RATE: 18 BRPM | TEMPERATURE: 97.4 F | DIASTOLIC BLOOD PRESSURE: 73 MMHG | HEART RATE: 71 BPM

## 2022-11-28 DIAGNOSIS — M54.2 CERVICAL PAIN (NECK): ICD-10-CM

## 2022-11-28 DIAGNOSIS — M47.812 CERVICAL SPONDYLOSIS: ICD-10-CM

## 2022-11-28 RX ADMIN — LIDOCAINE HYDROCHLORIDE 5 ML: 20 INJECTION, SOLUTION EPIDURAL; INFILTRATION; INTRACAUDAL at 13:14

## 2022-11-28 NOTE — DISCHARGE INSTRUCTIONS
Medial Branch Radiofrequency Ablation     WHAT YOU NEED TO KNOW:   Medial branch radiofrequency ablation (RFA) is a procedure used to treat facet joint pain in your neck, mid back, or lower back  Facet joints are found at the back of each vertebra  A needle electrode is used to send electrical currents to the nerves in your facet joint  The electrical currents create heat that damages the nerve so it cannot send pain signals  ACTIVITY  Do not drive or operate machinery today  No strenuous activity today - bending, lifting, etc   You may shower today, but do not sit in a tub of water  Resume normal activities tomorrow as tolerated  CARE OF THE INJECTION SITE  If you have soreness or pain, apply ice to the area today (20 minutes on/20 minutes off)  Starting tomorrow, you may use warm, moist heat or ice if needed  Notify the [facility] if you have any of the following: redness, drainage, swelling, or fever above 100°F     SPECIAL INSTRUCTIONS  Our office will call you tomorrow for a progress report and make an appointment for a follow up visit in 4 weeks  If you feel a sunburn-like sensation in the area of your procedure, call our office  MEDICATIONS  Continue to take all routine medications  Our office may have instructed you to hold some medications  As no general anesthesia was used in today's procedure, you should not experience any side effects related to anesthesia  If you have a problem specifically related to your procedure, please call our office at [phone]  Problems not related to your procedure should be directed to your primary care physician

## 2022-11-28 NOTE — TELEPHONE ENCOUNTER
Patient is S/P a Right C4-C6 RFA c/ SL on 11/28/22  Next OVS scheduled for 12/29/22  Please call on 11/29/22 post RFA   Thanks
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
## 2022-11-29 NOTE — TELEPHONE ENCOUNTER
S/w pt, stated that she has significant relief of her cervical symptoms s/p rfa yesterday  Pt stated that she does continue with some back pain s/p procedure last week, however she is scheduled to fu w/ NM on 12/29  Advised pt to allow 2-6 weeks for the full effect  Cb if s/s infection present: redness, drainage, swelling at the site or fever 100+, or if a sunburn like sensation in the area of the procedure presents  Ok to continue w/ rest, ice / heat, medication as prescribed / directed  Pt verbalized understanding and appreciation  Will fu as discussed  Massiel Soria

## 2022-12-22 ENCOUNTER — TELEPHONE (OUTPATIENT)
Dept: ADMINISTRATIVE | Facility: OTHER | Age: 62
End: 2022-12-22

## 2022-12-22 NOTE — TELEPHONE ENCOUNTER
12/22/22 2:19 PM    The patient was called and reminded about the open Cologuard order  Instructed to call the ordering provider's office if there are any questions about completing the CRC screen  Thank you    Tasha Lemus, [address] [place]   [facility]

## 2022-12-29 ENCOUNTER — OFFICE VISIT (OUTPATIENT)
Dept: PAIN MEDICINE | Facility: CLINIC | Age: 62
End: 2022-12-29

## 2022-12-29 VITALS
TEMPERATURE: 97.6 F | WEIGHT: 114 LBS | SYSTOLIC BLOOD PRESSURE: 105 MMHG | BODY MASS INDEX: 19.46 KG/M2 | HEIGHT: 64 IN | DIASTOLIC BLOOD PRESSURE: 70 MMHG

## 2022-12-29 DIAGNOSIS — G89.29 CHRONIC BILATERAL LOW BACK PAIN, UNSPECIFIED WHETHER SCIATICA PRESENT: ICD-10-CM

## 2022-12-29 DIAGNOSIS — M54.50 CHRONIC BILATERAL LOW BACK PAIN, UNSPECIFIED WHETHER SCIATICA PRESENT: ICD-10-CM

## 2022-12-29 DIAGNOSIS — M47.812 CERVICAL SPONDYLOSIS: ICD-10-CM

## 2022-12-29 DIAGNOSIS — M51.36 DISC DEGENERATION, LUMBAR: Primary | ICD-10-CM

## 2022-12-29 DIAGNOSIS — M47.816 LUMBAR SPONDYLOSIS: ICD-10-CM

## 2022-12-29 DIAGNOSIS — G89.4 CHRONIC PAIN SYNDROME: ICD-10-CM

## 2022-12-29 DIAGNOSIS — M54.2 NECK PAIN: ICD-10-CM

## 2022-12-29 DIAGNOSIS — M51.16 INTERVERTEBRAL DISC DISORDER WITH RADICULOPATHY OF LUMBAR REGION: ICD-10-CM

## 2022-12-29 DIAGNOSIS — M96.1 POSTLAMINECTOMY SYNDROME, LUMBAR: ICD-10-CM
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
## 2023-01-11 DIAGNOSIS — Z12.31 ENCOUNTER FOR SCREENING MAMMOGRAM FOR MALIGNANT NEOPLASM OF BREAST: ICD-10-CM

## 2023-02-02 ENCOUNTER — HOSPITAL ENCOUNTER (OUTPATIENT)
Dept: RADIOLOGY | Facility: CLINIC | Age: 63
Discharge: HOME | End: 2023-02-02
Admitting: ANESTHESIOLOGY

## 2023-02-02 VITALS
SYSTOLIC BLOOD PRESSURE: 121 MMHG | HEART RATE: 79 BPM | TEMPERATURE: 97.2 F | DIASTOLIC BLOOD PRESSURE: 72 MMHG | RESPIRATION RATE: 18 BRPM | OXYGEN SATURATION: 96 %

## 2023-02-02 DIAGNOSIS — M51.16 INTERVERTEBRAL DISC DISORDER WITH RADICULOPATHY OF LUMBAR REGION: ICD-10-CM

## 2023-02-02 RX ORDER — METHYLPREDNISOLONE ACETATE 80 MG/ML
80 INJECTION, SUSPENSION INTRA-ARTICULAR; INTRALESIONAL; INTRAMUSCULAR; PARENTERAL; SOFT TISSUE ONCE
Status: COMPLETED | OUTPATIENT
Start: 2023-02-02 | End: 2023-02-02

## 2023-02-02 RX ADMIN — IOHEXOL 1 ML: 300 INJECTION, SOLUTION INTRAVENOUS at 09:22

## 2023-02-02 RX ADMIN — METHYLPREDNISOLONE ACETATE 80 MG: 80 INJECTION, SUSPENSION INTRA-ARTICULAR; INTRALESIONAL; INTRAMUSCULAR; SOFT TISSUE at 09:22

## 2023-02-02 NOTE — DISCHARGE INSTRUCTIONS
Epidural Steroid Injection   WHAT YOU NEED TO KNOW:   An epidural steroid injection (JESSI) is a procedure to inject steroid medicine into the epidural space  The epidural space is between your spinal cord and vertebrae  Steroids reduce inflammation and fluid buildup in your spine that may be causing pain  You may be given pain medicine along with the steroids  ACTIVITY  Do not drive or operate machinery today  No strenuous activity today - bending, lifting, etc   You may resume normal activites starting tomorrow - start slowly and as tolerated  You may shower today, but no tub baths or hot tubs  You may have numbness for several hours from the local anesthetic  Please use caution and common sense, especially with weight-bearing activities  CARE OF THE INJECTION SITE  If you have soreness or pain, apply ice to the area today (20 minutes on/20 minutes off)  Starting tomorrow, you may use warm, moist heat or ice if needed  You may have an increase or change in your discomfort for 36-48 hours after your treatment  Apply ice and continue with any pain medication you have been prescribed  Notify the [facility] if you have any of the following: redness, drainage, swelling, headache, stiff neck or fever above 100°F     SPECIAL INSTRUCTIONS  Our office will contact you in approximately 7 days for a progress report  MEDICATIONS  Continue to take all routine medications  Our office may have instructed you to hold some medications  As no general anesthesia was used in today's procedure, you should not experience any side effects related to anesthesia  If you are diabetic, the steroids used in today's injection may temporarily increase your blood sugar levels after the first few days after your injection  Please keep a close eye on your sugars and alert the doctor who manages your diabetes if your sugars are significantly high from your baseline or you are symptomatic       If you have a problem specifically related to your procedure, please call our office at [phone]  Problems not related to your procedure should be directed to your primary care physician
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
## 2023-02-09 ENCOUNTER — TELEPHONE (OUTPATIENT)
Dept: PAIN MEDICINE | Facility: CLINIC | Age: 63
End: 2023-02-09

## 2023-02-09 NOTE — TELEPHONE ENCOUNTER
Pt reports no improvement post inj   Pain level 3/10  Pt aware I will call next week for an update    L3-L4 LESI 2/2,  F/u 2/23

## 2023-02-23 ENCOUNTER — OFFICE VISIT (OUTPATIENT)
Dept: PAIN MEDICINE | Facility: CLINIC | Age: 63
End: 2023-02-23

## 2023-02-23 VITALS
DIASTOLIC BLOOD PRESSURE: 70 MMHG | WEIGHT: 114 LBS | HEIGHT: 64 IN | TEMPERATURE: 98.2 F | SYSTOLIC BLOOD PRESSURE: 110 MMHG | BODY MASS INDEX: 19.46 KG/M2

## 2023-02-23 DIAGNOSIS — M96.1 POSTLAMINECTOMY SYNDROME, LUMBAR: ICD-10-CM

## 2023-02-23 DIAGNOSIS — G89.29 CHRONIC BILATERAL LOW BACK PAIN, UNSPECIFIED WHETHER SCIATICA PRESENT: ICD-10-CM

## 2023-02-23 DIAGNOSIS — M51.16 INTERVERTEBRAL DISC DISORDER WITH RADICULOPATHY OF LUMBAR REGION: ICD-10-CM

## 2023-02-23 DIAGNOSIS — M46.00 SPINAL ENTHESOPATHY (HCC): ICD-10-CM

## 2023-02-23 DIAGNOSIS — G89.4 CHRONIC PAIN SYNDROME: ICD-10-CM

## 2023-02-23 DIAGNOSIS — M54.50 CHRONIC BILATERAL LOW BACK PAIN, UNSPECIFIED WHETHER SCIATICA PRESENT: ICD-10-CM

## 2023-02-23 DIAGNOSIS — M54.2 NECK PAIN: ICD-10-CM

## 2023-02-23 DIAGNOSIS — G37.3 TRANSVERSE MYELITIS (HCC): ICD-10-CM

## 2023-02-23 DIAGNOSIS — M54.9 MID BACK PAIN: Primary | ICD-10-CM

## 2023-02-23 DIAGNOSIS — M54.14 THORACIC RADICULOPATHY: ICD-10-CM

## 2023-02-23 DIAGNOSIS — M47.812 CERVICAL SPONDYLOSIS: ICD-10-CM

## 2023-02-23 RX ORDER — METAXALONE 800 MG/1
800 TABLET ORAL DAILY
COMMUNITY
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
## 2023-03-03 ENCOUNTER — HOSPITAL ENCOUNTER (OUTPATIENT)
Dept: RADIOLOGY | Facility: HOSPITAL | Age: 63
Discharge: HOME | End: 2023-03-03

## 2023-03-03 DIAGNOSIS — M54.9 MID BACK PAIN: ICD-10-CM

## 2023-03-03 DIAGNOSIS — M54.14 THORACIC RADICULOPATHY: ICD-10-CM

## 2023-03-07 ENCOUNTER — TELEPHONE (OUTPATIENT)
Dept: PAIN MEDICINE | Facility: CLINIC | Age: 63
End: 2023-03-07

## 2023-03-07 NOTE — TELEPHONE ENCOUNTER
Caller: patient    Doctor: Patrick Current    Reason for call: requesting MRI result    Call back#: [phone]

## 2023-03-08 ENCOUNTER — EVALUATION (OUTPATIENT)
Dept: PHYSICAL THERAPY | Facility: CLINIC | Age: 63
End: 2023-03-08

## 2023-03-08 DIAGNOSIS — M96.1 POSTLAMINECTOMY SYNDROME, LUMBAR: ICD-10-CM

## 2023-03-08 DIAGNOSIS — M54.9 MID BACK PAIN: ICD-10-CM

## 2023-03-08 DIAGNOSIS — M54.14 THORACIC RADICULOPATHY: Primary | ICD-10-CM

## 2023-03-08 DIAGNOSIS — M51.16 INTERVERTEBRAL DISC DISORDER WITH RADICULOPATHY OF LUMBAR REGION: ICD-10-CM

## 2023-03-08 NOTE — PROGRESS NOTES
PT Evaluation     Today's date: 3/8/2023  Patient name: Krissy Nguyen  : 1960  MRN: [id number]  Referring provider: Prince Martinez  Dx:   Encounter Diagnosis     ICD-10-CM    1  Intervertebral disc disorder with radiculopathy of lumbar region  M51 16 Ambulatory referral to Physical Therapy      2  Postlaminectomy syndrome, lumbar  M96 1 Ambulatory referral to Physical Therapy      3  Mid back pain  M54 9 Ambulatory referral to Physical Therapy      4  Thoracic radiculopathy  M54 14 Ambulatory referral to Physical Therapy                     Assessment  Assessment details: Krissy Nguyen is a 70 y o  female who presents with chronic thoracic pain  Pt has decreased L LE strength, decreased lumbar/thoracic mobility and constant pain  Pt currently has difficulty with prolonged sitting, difficulty with getting out of bed, interrupted sleep, difficulty bending/lifting  Pt would benefit from skilled PT to address the above impairments and to return to pain free function  Impairments: abnormal or restricted ROM, impaired physical strength, lacks appropriate home exercise program and pain with function  Functional limitations: difficulty with prolonged sitting, difficulty with getting out of bed, interrupted sleep, difficulty bending/lifting  Symptom irritability: moderateUnderstanding of Dx/Px/POC: good   Prognosis: good    Goals  1  Pt will be independent with HEP upon DC  2  Pt's lumbar/thoracic mobility will improve by at least 25% to allow for bed mobility without difficulty  3  Pt will demonstrate good postural awareness to allow for prolonged sitting  4  Pt's pain will be no more than 4/10 to allow for uninterrupted sleep        Plan  Patient would benefit from: skilled physical therapy  Planned modality interventions: low level laser therapy  Planned therapy interventions: joint mobilization, manual therapy, neuromuscular re-education, patient education, therapeutic activities, therapeutic exercise, strengthening and home exercise program  Frequency: 2x week  Duration in visits: 12  Duration in weeks: 6  Treatment plan discussed with: patient        Subjective Evaluation    History of Present Illness  Onset date: chronic  Mechanism of injury: Pt reports a chronic history of back pain  Pt reports an onset of mid back pain about 2 years ago  Recent MRI of thoracic revealed minimal central disc protrusion at level T7-8 and T8-9, minimal disc bulge at level T11-12, central disc protrusion at level L1-2 resulting in mild canal stenosis  Pt reports that pain is in the center of her back and wraps around to both of her sides  Pt has had multiple surgeries/ablations/injections in lumbar spine  Pt presents to OP PT  Recurrent probem    Quality of life: good    Pain  Current pain ratin  At best pain ratin  At worst pain ratin  Location: mid back  Quality: sharp  Relieving factors: rest  Aggravating factors: sitting  Progression: no change      Diagnostic Tests  X-ray: abnormal  Treatments  Previous treatment: injection treatment  Patient Goals  Patient goals for therapy: decreased pain and increased strength          Objective     Palpation   Left   Tenderness of the cervical paraspinals  Right   Tenderness of the cervical paraspinals       Active Range of Motion     Lumbar   Flexion:  WFL  Extension:  with pain Restriction level: moderate  Left lateral flexion:  with pain Restriction level: minimal  Right lateral flexion:  with pain Restriction level: minimal  Left rotation:  WFL  Right rotation:  [facility]    Joint Play     Hypomobile: T3, T4, T5, T6, T7, T8, T9, T10, T11 and T12     Pain: T4, T5, T6, T7, T8, T9, T10, T11 and T12     Strength/Myotome Testing     Left Hip   Planes of Motion   Flexion: 3  Extension: 3  Abduction: 3+    Right Hip   Planes of Motion   Flexion: 4  Extension: 4  Abduction: 4             Precautions: multiple lumbar spine surgeries      Manuals 3/8 PA glides to thoracic             Transverse glides to thoracic                                       Neuro Re-Ed             Posture tband             Cane ext with scap sq                                                                              Ther Ex             UBE (back)             LTR 5"x15            PPT with ball sq 5"x15            Bridging             Open book stretch             Doorway stretch             Seated thoracic stretch 3x30"                         Ther Activity

## 2023-03-09 NOTE — TELEPHONE ENCOUNTER
Please let patient know MRI T spine shows:    Minimal central disc protrusion at level T7-8 and T8-9 resulting in mild canal narrowing, stable  Stable minimal disc bulge at level T11-12 without significant canal stenosis  Central disc protrusion at level L1-2 resulting in mild canal stenosis, progressed from prior MRI  Since she failed TPI and still have pain, Dr Jemima Sung can do a TESI @ T9 to see if this helps the pain   If interested let me know and I will order injection

## 2023-03-10 ENCOUNTER — OFFICE VISIT (OUTPATIENT)
Dept: PHYSICAL THERAPY | Facility: CLINIC | Age: 63
End: 2023-03-10

## 2023-03-10 DIAGNOSIS — M54.9 MID BACK PAIN: Primary | ICD-10-CM

## 2023-03-10 DIAGNOSIS — M54.14 THORACIC RADICULOPATHY: ICD-10-CM

## 2023-03-10 NOTE — PROGRESS NOTES
Daily Note     Today's date: 3/10/2023  Patient name: Joshua Reyna  : 1960  MRN: [id number]  Referring provider: Sukhi Vogt  Dx:   Encounter Diagnosis     ICD-10-CM    1  Mid back pain  M54 9       2  Thoracic radiculopathy  M54 14                      Subjective: Pt reports that she was not sore after the last session  Objective: See treatment diary below      Assessment: Pt had good tolerance to initiation of program  Gets frequent spasms across mid back with position changes  Plan: Continue per plan of care        Precautions: multiple lumbar spine surgeries      Manuals 3/8 3/10           PA glides to thoracic             Transverse glides to thoracic                                       Neuro Re-Ed             Posture tband  GTB x15ea           Cane ext with scap sq  10x10"                                                                            Ther Ex             UBE (back)  6'           LTR 5"x15 5"x15           PPT with ball sq 5"x15 5"x20           Bridging  x15           Supine SLR  x15ea           Open book stretch  3x30" ea           Doorway stretch  3x30"           Seated thoracic stretch 3x30" 3x30"           SL clamshells             Prone prop             Thoracic foam roller protocol             Ther Activity

## 2023-03-10 NOTE — TELEPHONE ENCOUNTER
S/w pt, advised of above  Pt verbalized agreement  Advised pt, the writer will make NM aware  Anticipate a cb from [address] to schedule procedure as discussed  Pt verbalized understanding and appreciation  Denied blood thinning medication

## 2023-03-10 NOTE — TELEPHONE ENCOUNTER
Caller: Patient    Doctor: Erwin Brizuela    Reason for call: Returning a missed call    Call back#:

## 2023-03-10 NOTE — TELEPHONE ENCOUNTER
Caller: Marilee Kinsey    Doctor: Dr Spencer Arias    Reason for call: Patient returning rn call     Call back#: [phone]

## 2023-03-13 DIAGNOSIS — M54.14 THORACIC RADICULOPATHY: ICD-10-CM

## 2023-03-13 DIAGNOSIS — M51.24: Primary | ICD-10-CM

## 2023-03-13 NOTE — TELEPHONE ENCOUNTER
PRE OP INSTRUCTIONS:     -If you are on prescription blood thinners, you may have to hold the medication for several days before the procedure  Please call the office to    discuss medication holds at [phone]   -Do not eat or drink ONE HOUR prior to your procedure  If you are diabetic, may follow regular breakfast/lunch schedule and take usual    diabetic medications   -Lumbar( low back) procedure, please wear comfortable slacks/pants   -Cervical (neck) procedure, please wear a shirt/blouse that is easy to remove   -A  is required to take you home form your procedure   -Continue all to take prescribed medication the day of your procedure, including blood pressure medications   -If you are prescribe antibiotics, have an active infection or have an open wound, please contact the office at [phone]   -Please refrain from any vaccinations two weeks before and two weeks after injection   -Insurance authorization received in not a guarantee of payment per your insurance company's authorization disclaimer and it is    your responsibility to verify your benefits  -If you have any questions about the instructions, please call me at [phone]    Hold medication  x _ full days prior, last dose on _  Patient advised to hold medication from Date till their appointment at that time instructions to restart will be given  Patient stated verbal understanding  Aware that nursing will call her to review hold dates as well

## 2023-03-14 ENCOUNTER — OFFICE VISIT (OUTPATIENT)
Dept: PHYSICAL THERAPY | Facility: CLINIC | Age: 63
End: 2023-03-14

## 2023-03-14 DIAGNOSIS — M54.14 THORACIC RADICULOPATHY: ICD-10-CM

## 2023-03-14 DIAGNOSIS — M54.9 MID BACK PAIN: Primary | ICD-10-CM

## 2023-03-17 ENCOUNTER — OFFICE VISIT (OUTPATIENT)
Dept: PHYSICAL THERAPY | Facility: CLINIC | Age: 63
End: 2023-03-17

## 2023-03-17 DIAGNOSIS — M54.9 MID BACK PAIN: Primary | ICD-10-CM

## 2023-03-17 DIAGNOSIS — M54.14 THORACIC RADICULOPATHY: ICD-10-CM

## 2023-03-17 NOTE — PROGRESS NOTES
Daily Note     Today's date: 3/17/2023  Patient name: Maria Antonia Flannery  : 1960  MRN: [id number]  Referring provider: Amalia Fields  Dx:   Encounter Diagnosis     ICD-10-CM    1  Mid back pain  M54 9       2  Thoracic radiculopathy  M54 14                      Subjective: Pt reports that she's getting trigger point injections on Monday and is getting thoracic injections in 2 weeks  Objective: See treatment diary below      Assessment: Pt had good tolerance to program  Continues to have spasms with bed mobility  Plan: Continue per plan of care        Precautions: multiple lumbar spine surgeries      Manuals 3/8 3/10 3/14 3/17                                                             Neuro Re-Ed             Posture tband  GTB x15ea grn x15 ea w/ TA GTB x15ea         Cane ext with scap sq  10x10" 10x10" 10x10"         TA   5"x5 standing review                                                             Ther Ex             UBE (back)  6' 6' 8'         LTR 5"x15 5"x15 hold          PPT with ball sq 5"x15 5"x20 5"x20 5"x20         Bridging  x15 w/ TA 5"x15 W/TA 5"x20         Supine SLR  x15ea w/ TA x15 ea W/TA x15ea         Open book stretch  3x30" ea hold          Doorway stretch  3x30" 3x30" 3x30"         Seated thoracic stretch 3x30" 3x30" 10x10" 10x10"         Thoracic extension elbows against wall   10x 15x         SL clamshells             Prone press up T/S focus   10x  review         Prone prop   3' 3'         Thoracic foam roller protocol             Ther Activity

## 2023-03-20 ENCOUNTER — OFFICE VISIT (OUTPATIENT)
Dept: PHYSICAL THERAPY | Facility: CLINIC | Age: 63
End: 2023-03-20

## 2023-03-20 DIAGNOSIS — M54.14 THORACIC RADICULOPATHY: ICD-10-CM

## 2023-03-20 DIAGNOSIS — M54.9 MID BACK PAIN: Primary | ICD-10-CM

## 2023-03-20 NOTE — PROGRESS NOTES
Daily Note     Today's date: 3/20/2023  Patient name: Tai Sterling  : 1960  MRN: [id number]  Referring provider: Ranjan Chiang  Dx:   Encounter Diagnosis     ICD-10-CM    1  Mid back pain  M54 9       2  Thoracic radiculopathy  M54 14                      Subjective: Pt reports no new changes  Objective: See treatment diary below      Assessment: Pt continues to have spasms/pain with all bed mobility and standing after prolonged sitting  Pt to see the neurologist today  Plan: Continue per plan of care        Precautions: multiple lumbar spine surgeries      Manuals 3/8 3/10 3/14 3/17 3/20                                                            Neuro Re-Ed             Posture tband  GTB x15ea grn x15 ea w/ TA GTB x15ea GTB x20ea        Cane ext with scap sq  10x10" 10x10" 10x10" 10x10"        TA   5"x5 standing review                                                             Ther Ex             UBE (back)  6' 6' 8' 8'        LTR 5"x15 5"x15 hold          PPT with ball sq 5"x15 5"x20 5"x20 5"x20 5"x20        Bridging  x15 w/ TA 5"x15 W/TA 5"x20 W/TA 5"x20        Supine SLR  x15ea w/ TA x15 ea W/TA x15ea W/TA x20ea        Open book stretch  3x30" ea hold          Doorway stretch  3x30" 3x30" 3x30" 3x30"        Seated thoracic stretch 3x30" 3x30" 10x10" 10x10" 10x10"        Thoracic extension elbows against wall   10x 15x 15x        SL clamshells     x20 ea        Prone press up T/S focus   10x  review         Prone prop   3' 3'         Thoracic foam roller protocol             Ther Activity

## 2023-03-22 ENCOUNTER — OFFICE VISIT (OUTPATIENT)
Dept: PHYSICAL THERAPY | Facility: CLINIC | Age: 63
End: 2023-03-22

## 2023-03-22 DIAGNOSIS — M51.16 INTERVERTEBRAL DISC DISORDER WITH RADICULOPATHY OF LUMBAR REGION: ICD-10-CM

## 2023-03-22 DIAGNOSIS — M96.1 POSTLAMINECTOMY SYNDROME, LUMBAR: ICD-10-CM

## 2023-03-22 DIAGNOSIS — M54.9 MID BACK PAIN: Primary | ICD-10-CM

## 2023-03-22 DIAGNOSIS — M54.14 THORACIC RADICULOPATHY: ICD-10-CM

## 2023-03-22 NOTE — PROGRESS NOTES
Daily Note     Today's date: 3/22/2023  Patient name: Gila Brooks  : 1960  MRN: [id number]  Referring provider: Jim Christy  Dx:   Encounter Diagnosis     ICD-10-CM    1  Mid back pain  M54 9       2  Thoracic radiculopathy  M54 14       3  Intervertebral disc disorder with radiculopathy of lumbar region  M51 16       4  Postlaminectomy syndrome, lumbar  M96 1                      Subjective: "I had trigger points done on Monday, and they hurt this time "   Notes she was painful yesterday, pain level is 6/10 upon arrival to therapy  Pt is scheduled for cataract surgery next month  Objective: See treatment diary below    Assessment: Performed exercise program w/o difficulty or discomfort  Demonstrates good knowledge of same  Issued GTB and updated written HEP instructions, reviewed same w/pt  Will monitor  Plan: Cont per POC       Precautions: multiple lumbar spine surgeries      Manuals 3/8 3/10 3/14 3/17 3/20 3/22                                                           Neuro Re-Ed             Posture tband  GTB x15ea grn x15 ea w/ TA GTB x15ea GTB x20ea GTB  x20ea       Cane ext with scap sq  10x10" 10x10" 10x10" 10x10" 10x10"       TA   5"x5 standing review                                                             Ther Ex             UBE (back)  6' 6' 8' 8' 8'       LTR 5"x15 5"x15 hold          PPT with ball sq 5"x15 5"x20 5"x20 5"x20 5"x20 5"x20       Bridging  x15 w/ TA 5"x15 W/TA 5"x20 W/TA 5"x20 W/TA  5"x20       Supine SLR  x15ea w/ TA x15 ea W/TA x15ea W/TA x20ea W/TA  x20ea       Open book stretch  3x30" ea hold          Doorway stretch  3x30" 3x30" 3x30" 3x30" 3x30"       Seated thoracic stretch 3x30" 3x30" 10x10" 10x10" 10x10" 10x10"       Thoracic extension elbows against wall   10x 15x 15x 15x       SL clamshells     x20 ea x20 ea       Prone press up T/S focus   10x  review         Prone prop   3' 3'         Thoracic foam roller protocol             Ther Activity

## 2023-03-27 ENCOUNTER — OFFICE VISIT (OUTPATIENT)
Dept: PHYSICAL THERAPY | Facility: CLINIC | Age: 63
End: 2023-03-27

## 2023-03-27 DIAGNOSIS — M96.1 POSTLAMINECTOMY SYNDROME, LUMBAR: ICD-10-CM

## 2023-03-27 DIAGNOSIS — M54.14 THORACIC RADICULOPATHY: ICD-10-CM

## 2023-03-27 DIAGNOSIS — M54.9 MID BACK PAIN: Primary | ICD-10-CM

## 2023-03-27 DIAGNOSIS — M51.16 INTERVERTEBRAL DISC DISORDER WITH RADICULOPATHY OF LUMBAR REGION: ICD-10-CM

## 2023-03-27 NOTE — PROGRESS NOTES
"Daily Note     Today's date: 3/27/2023  Patient name: Flora Caal  : 1960  MRN: [id number]  Referring provider: Chantel Hannon  Dx:   Encounter Diagnosis     ICD-10-CM    1  Mid back pain  M54 9       2  Thoracic radiculopathy  M54 14       3  Intervertebral disc disorder with radiculopathy of lumbar region  M51 16       4  Postlaminectomy syndrome, lumbar  M96 1                      Subjective:Patient reports having trigger point injections prior to Pt session today  Objective: See treatment diary below      Assessment: Pt did well with all TE as listed, reports no increased pain during or post tx  Plan: Continue per plan of care        Precautions: multiple lumbar spine surgeries      Manuals 3/8 3/10 3/14 3/17 3/20 3/22 3/27                                                          Neuro Re-Ed             Posture tband  GTB x15ea grn x15 ea w/ TA GTB x15ea GTB x20ea GTB  x20ea GTB x20 ea      Cane ext with scap sq  10x10\" 10x10\" 10x10\" 10x10\" 10x10\" 10x10\"      TA   5\"x5 standing review                                                             Ther Ex             UBE (back)  6' 6' 8' 8' 8' 8'      LTR 5\"x15 5\"x15 hold          PPT with ball sq 5\"x15 5\"x20 5\"x20 5\"x20 5\"x20 5\"x20 5\"x20      Bridging  x15 w/ TA 5\"x15 W/TA 5\"x20 W/TA 5\"x20 W/TA  5\"x20 W/ TA 5\"x20      Supine SLR  x15ea w/ TA x15 ea W/TA x15ea W/TA x20ea W/TA  x20ea W/ /TA x20 ea      Open book stretch  3x30\" ea hold          Doorway stretch  3x30\" 3x30\" 3x30\" 3x30\" 3x30\" 3x30\"      Seated thoracic stretch 3x30\" 3x30\" 10x10\" 10x10\" 10x10\" 10x10\" 10\"x10      Thoracic extension elbows against wall   10x 15x 15x 15x 5\" x15      SL clamshells     x20 ea x20 ea x20 ea      Prone press up T/S focus   10x  review         Prone prop   3' 3'         Thoracic foam roller protocol             Ther Activity                                                                                                                          "

## 2023-03-29 ENCOUNTER — OFFICE VISIT (OUTPATIENT)
Dept: PHYSICAL THERAPY | Facility: CLINIC | Age: 63
End: 2023-03-29

## 2023-03-29 DIAGNOSIS — M51.16 INTERVERTEBRAL DISC DISORDER WITH RADICULOPATHY OF LUMBAR REGION: ICD-10-CM

## 2023-03-29 DIAGNOSIS — M96.1 POSTLAMINECTOMY SYNDROME, LUMBAR: ICD-10-CM

## 2023-03-29 DIAGNOSIS — M54.14 THORACIC RADICULOPATHY: ICD-10-CM

## 2023-03-29 DIAGNOSIS — M54.9 MID BACK PAIN: Primary | ICD-10-CM

## 2023-03-29 NOTE — PROGRESS NOTES
"Daily Note     Today's date: 3/29/2023  Patient name: Bonita Deutsch  : 1960  MRN: [id number]  Referring provider: Deyanira Chapman  Dx:   Encounter Diagnosis     ICD-10-CM    1  Mid back pain  M54 9       2  Thoracic radiculopathy  M54 14       3  Intervertebral disc disorder with radiculopathy of lumbar region  M51 16       4  Postlaminectomy syndrome, lumbar  M96 1                      Subjective: Pt reports her thoracic pain level is 5/10, \"it's getting better  \"    Objective: See treatment diary below    Assessment: Pt performed exercise program w/o increasing symptoms  Change of position causes an increase in thoracic pain  Will monitor  Plan: Cont per POC       Precautions: multiple lumbar spine surgeries      Manuals 3/8 3/10 3/14 3/17 3/20 3/22 3/27 3/29                                                         Neuro Re-Ed             Posture tband  GTB x15ea grn x15 ea w/ TA GTB x15ea GTB x20ea GTB  x20ea GTB x20 ea GTB  [id number]     Cane ext with scap sq  10x10\" 10x10\" 10x10\" 10x10\" 10x10\" 10x10\" 10x10\"     TA   5\"x5 standing review                                                             Ther Ex             UBE (back)  6' 6' 8' 8' 8' 8' 8'     LTR 5\"x15 5\"x15 hold          PPT with ball sq 5\"x15 5\"x20 5\"x20 5\"x20 5\"x20 5\"x20 5\"x20 5\"x20     Bridging  x15 w/ TA 5\"x15 W/TA 5\"x20 W/TA 5\"x20 W/TA  5\"x20 W/ TA 5\"x20 W/TA  5\"x20     Supine SLR  x15ea w/ TA x15 ea W/TA x15ea W/TA x20ea W/TA  x20ea W/ /TA x20 ea W/TA  x20  ea     Open book stretch  3x30\" ea hold          Doorway stretch  3x30\" 3x30\" 3x30\" 3x30\" 3x30\" 3x30\" 3x30\"     Seated thoracic stretch 3x30\" 3x30\" 10x10\" 10x10\" 10x10\" 10x10\" 10\"x10 10\"x10     Thoracic extension elbows against wall   10x 15x 15x 15x 5\" x15 5\"x15     SL clamshells     x20 ea x20 ea x20 ea x20 ea     Prone press up T/S focus   10x  review         Prone prop   3' 3'         Thoracic foam roller protocol             Ther Activity                                "

## 2023-04-03 ENCOUNTER — OFFICE VISIT (OUTPATIENT)
Dept: PHYSICAL THERAPY | Facility: CLINIC | Age: 63
End: 2023-04-03

## 2023-04-03 DIAGNOSIS — M51.16 INTERVERTEBRAL DISC DISORDER WITH RADICULOPATHY OF LUMBAR REGION: ICD-10-CM

## 2023-04-03 DIAGNOSIS — M96.1 POSTLAMINECTOMY SYNDROME, LUMBAR: ICD-10-CM

## 2023-04-03 DIAGNOSIS — M54.9 MID BACK PAIN: Primary | ICD-10-CM

## 2023-04-03 DIAGNOSIS — M54.14 THORACIC RADICULOPATHY: ICD-10-CM

## 2023-04-03 NOTE — PROGRESS NOTES
"Daily Note     Today's date: 4/3/2023  Patient name: Yosef De Leon  : 1960  MRN: [id number]  Referring provider: Vanessa Nieves  Dx:   Encounter Diagnosis     ICD-10-CM    1  Mid back pain  M54 9       2  Thoracic radiculopathy  M54 14       3  Intervertebral disc disorder with radiculopathy of lumbar region  M51 16       4  Postlaminectomy syndrome, lumbar  M96 1                      Subjective: Pt reports feeling a little better  Objective: See treatment diary below    Assessment: Pt tweaked her back when standing up from chair after performing thoracic stretch  Performed exercise progressions w/o complaint of pain  Will monitor  Plan: Cont per POC       Precautions: multiple lumbar spine surgeries      Manuals 3/8 3/10 3/14 3/17 3/20 3/22 3/27 3/29 4/3                                                        Neuro Re-Ed             Posture tband  GTB x15ea grn x15 ea w/ TA GTB x15ea GTB x20ea GTB  x20ea GTB x20 ea GTB  x20ea Blue  x15 ea     Cane ext with scap sq  10x10\" 10x10\" 10x10\" 10x10\" 10x10\" 10x10\" 10x10\" 10x10\"    TA   5\"x5 standing review                                                             Ther Ex             UBE (back)  6' 6' 8' 8' 8' 8' 8' 8'    LTR 5\"x15 5\"x15 hold          PPT with ball sq 5\"x15 5\"x20 5\"x20 5\"x20 5\"x20 5\"x20 5\"x20 5\"x20 5\"x20    Bridging  x15 w/ TA 5\"x15 W/TA 5\"x20 W/TA 5\"x20 W/TA  5\"x20 W/ TA 5\"x20 W/TA  5\"x20 W/TA  5\"x20    Supine SLR  x15ea w/ TA x15 ea W/TA x15ea W/TA x20ea W/TA  x20ea W/ /TA x20 ea W/TA  x20  ea W/TA  x20 ea    Open book stretch  3x30\" ea hold          Doorway stretch  3x30\" 3x30\" 3x30\" 3x30\" 3x30\" 3x30\" 3x30\" 3x30\"    Seated thoracic stretch 3x30\" 3x30\" 10x10\" 10x10\" 10x10\" 10x10\" 10\"x10 10\"x10 10x10\"    Thoracic extension elbows against wall   10x 15x 15x 15x 5\" x15 5\"x15 5\"x15    SL clamshells     x20 ea x20 ea x20 ea x20 ea RTB  x15 ea    Prone press up T/S focus   10x  review         Prone prop   3' 3'         Thoracic foam " roller protocol             Ther Activity

## 2023-04-05 ENCOUNTER — OFFICE VISIT (OUTPATIENT)
Dept: PHYSICAL THERAPY | Facility: CLINIC | Age: 63
End: 2023-04-05

## 2023-04-05 DIAGNOSIS — M96.1 POSTLAMINECTOMY SYNDROME, LUMBAR: ICD-10-CM

## 2023-04-05 DIAGNOSIS — M54.9 MID BACK PAIN: Primary | ICD-10-CM

## 2023-04-05 DIAGNOSIS — M51.16 INTERVERTEBRAL DISC DISORDER WITH RADICULOPATHY OF LUMBAR REGION: ICD-10-CM

## 2023-04-05 DIAGNOSIS — M54.14 THORACIC RADICULOPATHY: ICD-10-CM

## 2023-04-05 NOTE — PROGRESS NOTES
"Daily Note     Today's date: 2023  Patient name: Saira Trejo  : 1960  MRN: [id number]  Referring provider: Bethany Miller  Dx:   Encounter Diagnosis     ICD-10-CM    1  Mid back pain  M54 9       2  Thoracic radiculopathy  M54 14       3  Intervertebral disc disorder with radiculopathy of lumbar region  M51 16       4  Postlaminectomy syndrome, lumbar  M96 1           Start Time: 44  Stop Time: 825  Total time in clinic (min): 30 minutes    Subjective: Patient reports that she has an epidural scheduled for tomorrow  She was more fatigued with increased resistance last visit but denies aggravation of chief complaint  Objective: See treatment diary below      Assessment: Prescribed activity not progressed today secondary to response from last visit  Patient continues to tolerate exercises well with most difficulty occurring with rolling onto side to perform clamshells  Assess effect of epidural next week and modify POC, as indicated  Plan: Continue per plan of care        Precautions: multiple lumbar spine surgeries      Manuals 3/8 3/10 3/14 3/17 3/20 3/22 3/27 3/29 4/3 4/5                                                       Neuro Re-Ed             Posture tband  GTB x15ea grn x15 ea w/ TA GTB x15ea GTB x20ea GTB  x20ea GTB x20 ea GTB  x20ea Blue  x15 ea  Blue 15x ea   Cane ext with scap sq  10x10\" 10x10\" 10x10\" 10x10\" 10x10\" 10x10\" 10x10\" 10x10\" 10x10\"   TA   5\"x5 standing review                                                             Ther Ex             UBE (back)  6' 6' 8' 8' 8' 8' 8' 8' 8'   LTR 5\"x15 5\"x15 hold          PPT with ball sq 5\"x15 5\"x20 5\"x20 5\"x20 5\"x20 5\"x20 5\"x20 5\"x20 5\"x20 5\"x20   Bridging  x15 w/ TA 5\"x15 W/TA 5\"x20 W/TA 5\"x20 W/TA  5\"x20 W/ TA 5\"x20 W/TA  5\"x20 W/TA  5\"x20 W/TA  5\"x20   Supine SLR  x15ea w/ TA x15 ea W/TA x15ea W/TA x20ea W/TA  x20ea W/ /TA x20 ea W/TA  x20  ea W/TA  x20 ea W/TA  x20 ea   Open book stretch  3x30\" ea hold        " "  Doorway stretch  3x30\" 3x30\" 3x30\" 3x30\" 3x30\" 3x30\" 3x30\" 3x30\" 3x30\"   Seated thoracic stretch 3x30\" 3x30\" 10x10\" 10x10\" 10x10\" 10x10\" 10\"x10 10\"x10 10x10\" 10x10\"   Thoracic extension elbows against wall   10x 15x 15x 15x 5\" x15 5\"x15 5\"x15 5\"x15   SL clamshells     x20 ea x20 ea x20 ea x20 ea RTB  x15 ea RTB 15x ea   Prone press up T/S focus   10x  review         Prone prop   3' 3'         Thoracic foam roller protocol             Ther Activity                                                                                                                          "

## 2023-04-06 ENCOUNTER — HOSPITAL ENCOUNTER (OUTPATIENT)
Dept: RADIOLOGY | Facility: CLINIC | Age: 63
Discharge: HOME | End: 2023-04-06
Admitting: ANESTHESIOLOGY

## 2023-04-06 VITALS
TEMPERATURE: 97.2 F | RESPIRATION RATE: 18 BRPM | HEART RATE: 87 BPM | OXYGEN SATURATION: 96 % | SYSTOLIC BLOOD PRESSURE: 131 MMHG | DIASTOLIC BLOOD PRESSURE: 71 MMHG

## 2023-04-06 DIAGNOSIS — M54.14 THORACIC RADICULOPATHY: ICD-10-CM

## 2023-04-06 DIAGNOSIS — M51.24: ICD-10-CM

## 2023-04-06 RX ORDER — PAPAVERINE HCL 150 MG
10 CAPSULE, EXTENDED RELEASE ORAL ONCE
Status: COMPLETED | OUTPATIENT
Start: 2023-04-06 | End: 2023-04-06

## 2023-04-06 RX ADMIN — DEXAMETHASONE SODIUM PHOSPHATE 10 MG: 10 INJECTION, SOLUTION INTRAMUSCULAR; INTRAVENOUS at 09:40

## 2023-04-06 RX ADMIN — IOHEXOL 1 ML: 300 INJECTION, SOLUTION INTRAVENOUS at 09:40

## 2023-04-06 NOTE — DISCHARGE INSTRUCTIONS
Epidural Steroid Injection   WHAT YOU NEED TO KNOW:   An epidural steroid injection (JESSI) is a procedure to inject steroid medicine into the epidural space  The epidural space is between your spinal cord and vertebrae  Steroids reduce inflammation and fluid buildup in your spine that may be causing pain  You may be given pain medicine along with the steroids  ACTIVITY  Do not drive or operate machinery today  No strenuous activity today - bending, lifting, etc   You may resume normal activites starting tomorrow - start slowly and as tolerated  You may shower today, but no tub baths or hot tubs  You may have numbness for several hours from the local anesthetic  Please use caution and common sense, especially with weight-bearing activities  CARE OF THE INJECTION SITE  If you have soreness or pain, apply ice to the area today (20 minutes on/20 minutes off)  Starting tomorrow, you may use warm, moist heat or ice if needed  You may have an increase or change in your discomfort for 36-48 hours after your treatment  Apply ice and continue with any pain medication you have been prescribed  Notify the [facility] if you have any of the following: redness, drainage, swelling, headache, stiff neck or fever above 100°F     SPECIAL INSTRUCTIONS  Our office will contact you in approximately 7 days for a progress report  MEDICATIONS  Continue to take all routine medications  Our office may have instructed you to hold some medications  As no general anesthesia was used in today's procedure, you should not experience any side effects related to anesthesia  If you are diabetic, the steroids used in today's injection may temporarily increase your blood sugar levels after the first few days after your injection  Please keep a close eye on your sugars and alert the doctor who manages your diabetes if your sugars are significantly high from your baseline or you are symptomatic       If you have a problem specifically related to your procedure, please call our office at [phone]  Problems not related to your procedure should be directed to your primary care physician
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
## 2023-04-13 ENCOUNTER — TELEPHONE (OUTPATIENT)
Dept: PAIN MEDICINE | Facility: CLINIC | Age: 63
End: 2023-04-13

## 2023-04-28 ENCOUNTER — OFFICE VISIT (OUTPATIENT)
Dept: PAIN MEDICINE | Facility: CLINIC | Age: 63
End: 2023-04-28

## 2023-04-28 VITALS
DIASTOLIC BLOOD PRESSURE: 78 MMHG | HEIGHT: 64 IN | WEIGHT: 115 LBS | TEMPERATURE: 98.5 F | BODY MASS INDEX: 19.63 KG/M2 | SYSTOLIC BLOOD PRESSURE: 118 MMHG | HEART RATE: 78 BPM

## 2023-04-28 DIAGNOSIS — G89.4 CHRONIC PAIN SYNDROME: ICD-10-CM

## 2023-04-28 DIAGNOSIS — M54.50 CHRONIC BILATERAL LOW BACK PAIN WITHOUT SCIATICA: ICD-10-CM

## 2023-04-28 DIAGNOSIS — M51.26 LUMBAR DISC HERNIATION: Primary | ICD-10-CM

## 2023-04-28 DIAGNOSIS — G89.29 CHRONIC BILATERAL LOW BACK PAIN WITHOUT SCIATICA: ICD-10-CM
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
## 2023-05-03 ENCOUNTER — OFFICE VISIT (OUTPATIENT)
Dept: PHYSICAL THERAPY | Facility: CLINIC | Age: 63
End: 2023-05-03

## 2023-05-03 DIAGNOSIS — M54.9 MID BACK PAIN: ICD-10-CM

## 2023-05-03 DIAGNOSIS — M51.16 INTERVERTEBRAL DISC DISORDER WITH RADICULOPATHY OF LUMBAR REGION: ICD-10-CM

## 2023-05-03 DIAGNOSIS — M96.1 POSTLAMINECTOMY SYNDROME, LUMBAR: ICD-10-CM

## 2023-05-03 DIAGNOSIS — M54.14 THORACIC RADICULOPATHY: Primary | ICD-10-CM

## 2023-05-03 NOTE — PROGRESS NOTES
"Daily Note     Today's date: 5/3/2023  Patient name: David Schmitz  : 1960  MRN: [id number]  Referring provider: Devonte Quintero  Dx:   Encounter Diagnosis     ICD-10-CM    1  Thoracic radiculopathy  M54 14       2  Mid back pain  M54 9       3  Intervertebral disc disorder with radiculopathy of lumbar region  M51 16       4  Postlaminectomy syndrome, lumbar  M96 1                      Subjective: Pt returns to therapy after having had cataract surgery on her R eye  Pt is scheduled for cataract surgery on her L eye on 5/10/23  Objective: See treatment diary below    Assessment: Performed exercise program w/o difficulty or discomfort  Demonstrates good knowledge of same  Will monitor  Plan: Cont per POC       Precautions: multiple lumbar spine surgeries      Manuals  4/10 4/12 4/17  5/3  3/22 3/27 3/29 4/3 4/5                                                       Neuro Re-Ed             Posture tband Blue  15x ea Blue 20x ea Blue  20x ea Blue   20x ea  GTB  x20ea GTB x20 ea GTB  x20ea Blue  x15 ea  Blue 15x ea   Cane ext with scap sq 10x10\" 10x10\" 10x10\" 10x10\"  10x10\" 10x10\" 10x10\" 10x10\" 10x10\"   TA                                                                 Ther Ex             UBE (back) 8' 8' 8' 8'  8' 8' 8' 8' 8'   LTR             PPT with ball sq 5\"x20 5\"x20 5\"x20 5\"x20  5\"x20 5\"x20 5\"x20 5\"x20 5\"x20   Bridging W/TA 5\"x20 W/TA 5\"x20 W/TA  5\"x20 W/TA  5\"x20  W/TA  5\"x20 W/ TA 5\"x20 W/TA  5\"x20 W/TA  5\"x20 W/TA  5\"x20   Supine SLR W/TA   x20 ea W/TA x20ea W/TA  x20 ea W/TA  x20 ea  W/TA  x20ea W/ /TA x20 ea W/TA  x20  ea W/TA  x20 ea W/TA  x20 ea   Doorway stretch 3x30\" 3x30\" 3x30\" 3x30\"  3x30\" 3x30\" 3x30\" 3x30\" 3x30\"   Seated thoracic stretch 10x10\" 10x10\" 10x10\" 10x10\"  10x10\" 10\"x10 10\"x10 10x10\" 10x10\"   Thoracic extension elbows against wall 5\"x20 5\"x20 5\"x20 5\"x20  15x 5\" x15 5\"x15 5\"x15 5\"x15   SL clamshells  5\"x20 ea GTB  5\"x20ea GTB  5\"x20  ea  x20 ea x20 ea x20 ea RTB  x15 " ea RTB 15x ea   Prone press up T/S focus             Prone prop             Ther Activity

## 2023-05-25 ENCOUNTER — OFFICE VISIT (OUTPATIENT)
Dept: PHYSICAL THERAPY | Facility: CLINIC | Age: 63
End: 2023-05-25

## 2023-05-25 DIAGNOSIS — M51.26 LUMBAR DISC HERNIATION: ICD-10-CM

## 2023-05-25 DIAGNOSIS — G89.29 CHRONIC BILATERAL LOW BACK PAIN WITHOUT SCIATICA: ICD-10-CM

## 2023-05-25 DIAGNOSIS — M54.50 CHRONIC BILATERAL LOW BACK PAIN WITHOUT SCIATICA: ICD-10-CM

## 2023-05-30 ENCOUNTER — APPOINTMENT (OUTPATIENT)
Dept: PHYSICAL THERAPY | Facility: CLINIC | Age: 63
End: 2023-05-30
Payer: MEDICARE

## 2023-05-31 ENCOUNTER — OFFICE VISIT (OUTPATIENT)
Dept: PHYSICAL THERAPY | Facility: CLINIC | Age: 63
End: 2023-05-31

## 2023-05-31 DIAGNOSIS — M54.50 CHRONIC BILATERAL LOW BACK PAIN WITHOUT SCIATICA: ICD-10-CM

## 2023-05-31 DIAGNOSIS — M51.26 LUMBAR DISC HERNIATION: Primary | ICD-10-CM

## 2023-05-31 DIAGNOSIS — G89.29 CHRONIC BILATERAL LOW BACK PAIN WITHOUT SCIATICA: ICD-10-CM

## 2023-05-31 NOTE — PROGRESS NOTES
"Daily Note     Today's date: 2023  Patient name: Darrell Laughlin  : 1960  MRN: [id number]  Referring provider: Joan Pablo  Dx: Lumbar disc herniation  Chronic bilateral low back pain without sciatica                 Subjective: Pt reports she rec ligament injections yesterday, in cerv, T-9 and L-5, w/good relief  Refer to Epic note regarding same  Objective: See treatment diary below    Assessment: Performed exercise program w/o complaint  Will monitor  Plan: Cont per POC       Precautions: multiple lumbar spine surgeries      Manuals  4/10 4/12 4/17  5/3 5/25 5/31  3/29 4/3 4/5                                                       Neuro Re-Ed             Posture tband Blue  15x ea Blue 20x ea Blue  20x ea Blue   20x ea Blue 20x ea Blue  20x ea  GTB  x20ea Blue  x15 ea  Blue 15x ea   Cane ext with scap sq 10x10\" 10x10\" 10x10\" 10x10\" 10x10\" 10x10\"  10x10\" 10x10\" 10x10\"   TA                                                                 Ther Ex             UBE (back) 8' 8' 8' 8' 8' 8'  8' 8' 8'   LTR             PPT with ball sq 5\"x20 5\"x20 5\"x20 5\"x20 5\"x20 5\"x20  5\"x20 5\"x20 5\"x20   Bridging W/TA 5\"x20 W/TA 5\"x20 W/TA  5\"x20 W/TA  5\"x20 x20 5\"x20  W/TA  5\"x20 W/TA  5\"x20 W/TA  5\"x20   Supine SLR W/TA   x20 ea W/TA x20ea W/TA  x20 ea W/TA  x20 ea x20ea x20ea  W/TA  x20  ea W/TA  x20 ea W/TA  x20 ea   Doorway stretch 3x30\" 3x30\" 3x30\" 3x30\" 3x30\" 3x30\"  3x30\" 3x30\" 3x30\"   Seated thoracic stretch 10x10\" 10x10\" 10x10\" 10x10\" 3x30\" 3x30\"  10\"x10 10x10\" 10x10\"   Thoracic extension elbows against wall 5\"x20 5\"x20 5\"x20 5\"x20 5\"x20 5\"x20  5\"x15 5\"x15 5\"x15   SL clamshells  5\"x20 ea GTB  5\"x20ea GTB  5\"x20  ea 5\"x15 ea GTB  5\"x20 ea  x20 ea RTB  x15 ea RTB 15x ea   Prone press up T/S focus             Prone prop             Ther Activity                                                                                                                          "

## 2023-06-01 ENCOUNTER — OFFICE VISIT (OUTPATIENT)
Dept: PHYSICAL THERAPY | Facility: CLINIC | Age: 63
End: 2023-06-01

## 2023-06-01 DIAGNOSIS — M54.14 THORACIC RADICULOPATHY: Primary | ICD-10-CM

## 2023-06-01 DIAGNOSIS — M54.9 MID BACK PAIN: ICD-10-CM

## 2023-06-01 NOTE — PROGRESS NOTES
"Daily Note     Today's date: 2023  Patient name: Lola Mena  : 1960  MRN: [id number]  Referring provider: Zeferion Ramirez  Dx:   Encounter Diagnosis     ICD-10-CM    1  Thoracic radiculopathy  M54 14       2  Mid back pain  M54 9                      Subjective: Pt reports no new changes  Objective: See treatment diary below      Assessment: Pt has good tolerance to all activities  No discomfort noted t/o session  Plan: Continue per plan of care        Precautions: multiple lumbar spine surgeries      Manuals  4/10 4/12 4/17  5/3 5/25 5/31 6/1                                           Neuro Re-Ed          Posture tband Blue  15x ea Blue 20x ea Blue  20x ea Blue   20x ea Blue 20x ea Blue  20x ea Blue 20x ea   Cane ext with scap sq 10x10\" 10x10\" 10x10\" 10x10\" 10x10\" 10x10\" 10x10\"   TA                                                  Ther Ex          UBE (back) 8' 8' 8' 8' 8' 8' 8'   LTR          PPT with ball sq 5\"x20 5\"x20 5\"x20 5\"x20 5\"x20 5\"x20 5\"x20   Bridging W/TA 5\"x20 W/TA 5\"x20 W/TA  5\"x20 W/TA  5\"x20 x20 5\"x20 5\"x20   Supine SLR W/TA   x20 ea W/TA x20ea W/TA  x20 ea W/TA  x20 ea x20ea x20ea x20ea   Doorway stretch 3x30\" 3x30\" 3x30\" 3x30\" 3x30\" 3x30\" 3x30\"   Seated thoracic stretch 10x10\" 10x10\" 10x10\" 10x10\" 3x30\" 3x30\" 3x30\"   Thoracic extension elbows against wall 5\"x20 5\"x20 5\"x20 5\"x20 5\"x20 5\"x20 5\"x20   SL clamshells  5\"x20 ea GTB  5\"x20ea GTB  5\"x20  ea 5\"x15 ea GTB  5\"x20 ea BTB 5\"x20   Prone press up T/S focus          Prone prop          Ther Activity                                                                                               "

## 2023-06-06 ENCOUNTER — OFFICE VISIT (OUTPATIENT)
Dept: PHYSICAL THERAPY | Facility: CLINIC | Age: 63
End: 2023-06-06
Payer: MEDICARE

## 2023-06-06 ENCOUNTER — TELEPHONE (OUTPATIENT)
Dept: OBGYN CLINIC | Facility: CLINIC | Age: 63
End: 2023-06-06

## 2023-06-06 ENCOUNTER — ANNUAL EXAM (OUTPATIENT)
Dept: OBGYN CLINIC | Facility: CLINIC | Age: 63
End: 2023-06-06
Payer: MEDICARE

## 2023-06-06 VITALS
SYSTOLIC BLOOD PRESSURE: 102 MMHG | DIASTOLIC BLOOD PRESSURE: 60 MMHG | HEIGHT: 63 IN | WEIGHT: 117.6 LBS | BODY MASS INDEX: 20.84 KG/M2

## 2023-06-06 DIAGNOSIS — M54.9 MID BACK PAIN: ICD-10-CM

## 2023-06-06 DIAGNOSIS — Z01.419 ENCOUNTER FOR ANNUAL ROUTINE GYNECOLOGICAL EXAMINATION: Primary | ICD-10-CM

## 2023-06-06 DIAGNOSIS — M54.14 THORACIC RADICULOPATHY: Primary | ICD-10-CM

## 2023-06-06 DIAGNOSIS — M51.26 LUMBAR DISC HERNIATION: ICD-10-CM

## 2023-06-06 DIAGNOSIS — Z12.31 BREAST CANCER SCREENING BY MAMMOGRAM: ICD-10-CM

## 2023-06-06 DIAGNOSIS — M54.50 CHRONIC BILATERAL LOW BACK PAIN WITHOUT SCIATICA: ICD-10-CM

## 2023-06-06 DIAGNOSIS — G89.29 CHRONIC BILATERAL LOW BACK PAIN WITHOUT SCIATICA: ICD-10-CM

## 2023-06-06 DIAGNOSIS — Z79.890 HORMONE REPLACEMENT THERAPY (HRT): ICD-10-CM

## 2023-06-06 PROCEDURE — G0101 CA SCREEN;PELVIC/BREAST EXAM: HCPCS | Performed by: OBSTETRICS & GYNECOLOGY

## 2023-06-06 PROCEDURE — 97112 NEUROMUSCULAR REEDUCATION: CPT

## 2023-06-06 PROCEDURE — 97110 THERAPEUTIC EXERCISES: CPT

## 2023-06-06 RX ORDER — CYANOCOBALAMIN (VITAMIN B-12) 500 MCG
TABLET ORAL EVERY 24 HOURS
COMMUNITY

## 2023-06-06 RX ORDER — TRETINOIN 0.025 %
CREAM (GRAM) TOPICAL
COMMUNITY
Start: 2023-05-30
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
## 2023-06-06 NOTE — TELEPHONE ENCOUNTER
Franky Becerra l/m Dr Zarina Massey is waiting for her to call with name of her estrogen      Estradiol   025 manufactured by [facility]    Dr Zarina Massey, Serena Singh

## 2023-06-06 NOTE — ASSESSMENT & PLAN NOTE
Stopped patch for 6 weeks and hot flashes returned with significance  Now improving after restarting  Wishes to continue current dose  She is on lowest dose  Reviewed w/ pt risks are increase risk in VTE, stroke, CAD and breast cancer (risks for estrogen only, pt after hysterectomy, are only stroke and VTE)  Studies show these risks increase with age at treatment, age at start of treatment and prolonged use  Risks can be limited by limiting duration of therapy to 5 yrs  Benefit are reduction hot flashes/night sweats, improve general well being and energy, decrease dysparunia, decrease fracture and colon cancer risk  Pt wishes to continue on HRT  Expressed understanding of risk and benefits of continuing medication  Refilled 1 yr

## 2023-06-06 NOTE — PROGRESS NOTES
"Daily Note     Today's date: 2023  Patient name: Tiffanie Apple  : 1960  MRN: [id number]  Referring provider: Saxis Hammed  Dx:   Encounter Diagnosis     ICD-10-CM    1  Thoracic radiculopathy  M54 14       2  Mid back pain  M54 9       3  Lumbar disc herniation  M51 26       4  Chronic bilateral low back pain without sciatica  M54 50     G89 29                      Subjective: Pt reports that occasionally she has pain at fibulars  She states pain is sudden and is not sure what is causing pain  She states that the injections have been successful in reducing sxs  Objective: See treatment diary below      Assessment: Session initiated on UBE for postural strengthening  VCs and tactile cues utilized for shoulder hiking with postural exercises  Some modifications used for standing T-S extension with pelvis pushed further into wall for more effective stretch  Patient would benefit from continued PT  Plan: Continue per plan of care        Precautions: multiple lumbar spine surgeries      Manuals 6/6 4/12 4/17  5/3 5/25 5/31 6/1                                           Neuro Re-Ed          Posture tband Blue 20x,    Low for final position Blue 20x ea Blue  20x ea Blue   20x ea Blue 20x ea Blue  20x ea Blue 20x ea   Cane ext with scap sq 10x10\" 10x10\" 10x10\" 10x10\" 10x10\" 10x10\" 10x10\"   TA                                                  Ther Ex          UBE (back) 8 8' 8' 8' 8' 8' 8'   LTR          PPT with ball sq 5\"x20 5\"x20 5\"x20 5\"x20 5\"x20 5\"x20 5\"x20   Bridging 5\"x20 W/TA 5\"x20 W/TA  5\"x20 W/TA  5\"x20 x20 5\"x20 5\"x20   Supine SLR 3x30\" W/TA x20ea W/TA  x20 ea W/TA  x20 ea x20ea x20ea x20ea   Doorway stretch 3x30\" 3x30\" 3x30\" 3x30\" 3x30\" 3x30\" 3x30\"   Seated thoracic stretch [id number]\"/ EX 30\"x3 10x10\" 10x10\" 10x10\" 3x30\" 3x30\" 3x30\"   Thoracic extension elbows against wall 5\"x20 5\"x20 5\"x20 5\"x20 5\"x20 5\"x20 5\"x20   SL clamshells BTB 5\"x20 5\"x20 ea GTB  5\"x20ea GTB  5\"x20  ea 5\"x15 " "ea GTB  5\"x20 ea BTB 5\"x20   Prone press up T/S focus          Prone prop          Ther Activity                                                                                               "
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
## 2023-06-08 ENCOUNTER — OFFICE VISIT (OUTPATIENT)
Dept: PHYSICAL THERAPY | Facility: CLINIC | Age: 63
End: 2023-06-08
Payer: MEDICARE

## 2023-06-08 DIAGNOSIS — M54.14 THORACIC RADICULOPATHY: Primary | ICD-10-CM

## 2023-06-08 DIAGNOSIS — M54.9 MID BACK PAIN: ICD-10-CM

## 2023-06-08 PROCEDURE — 97112 NEUROMUSCULAR REEDUCATION: CPT | Performed by: PHYSICAL THERAPIST

## 2023-06-08 PROCEDURE — 97110 THERAPEUTIC EXERCISES: CPT | Performed by: PHYSICAL THERAPIST

## 2023-06-08 NOTE — PROGRESS NOTES
"Daily Note     Today's date: 2023  Patient name: Tiffanie Apple  : 1960  MRN: [id number]  Referring provider: Croydon Hammed  Dx:   Encounter Diagnosis     ICD-10-CM    1  Thoracic radiculopathy  M54 14       2  Mid back pain  M54 9                      Subjective: Pt reports no new changes  Objective: See treatment diary below      Assessment: Pt had good tolerance to all activities  No increased pain reports t/o session  Plan: Continue per plan of care        Precautions: multiple lumbar spine surgeries      Manuals 6/6 6/8   5/3 5/25 5/31 6/1                                           Neuro Re-Ed          Posture tband Blue 20x,    Low for final position Blue x20ea  Blue   20x ea Blue 20x ea Blue  20x ea Blue 20x ea   Cane ext with scap sq 10x10\" 10x10\"  10x10\" 10x10\" 10x10\" 10x10\"   TA                                                  Ther Ex          UBE (back) 8 8'  8' 8' 8' 8'   LTR          PPT with ball sq 5\"x20 5\"x20  5\"x20 5\"x20 5\"x20 5\"x20   Bridging 5\"x20 5\"x20  W/TA  5\"x20 x20 5\"x20 5\"x20   Supine SLR 3x30\" x20 ea  W/TA  x20 ea x20ea x20ea x20ea   Doorway stretch 3x30\" 3s30\"  3x30\" 3x30\" 3x30\" 3x30\"   Seated thoracic stretch [id number]\"/ EX 30\"x3 Rot 10x10\" ea  Ext 3x30\"  10x10\" 3x30\" 3x30\" 3x30\"   Thoracic extension elbows against wall 5\"x20 5\"x20  5\"x20 5\"x20 5\"x20 5\"x20   SL clamshells BTB 5\"x20 BTB 5\"x20  GTB  5\"x20  ea 5\"x15 ea GTB  5\"x20 ea BTB 5\"x20   Prone press up T/S focus          Prone prop          Ther Activity                                                                                               "

## 2023-06-13 ENCOUNTER — OFFICE VISIT (OUTPATIENT)
Dept: PHYSICAL THERAPY | Facility: CLINIC | Age: 63
End: 2023-06-13
Payer: MEDICARE

## 2023-06-13 DIAGNOSIS — M54.14 THORACIC RADICULOPATHY: Primary | ICD-10-CM

## 2023-06-13 DIAGNOSIS — M54.9 MID BACK PAIN: ICD-10-CM

## 2023-06-13 PROCEDURE — 97110 THERAPEUTIC EXERCISES: CPT | Performed by: PHYSICAL THERAPIST

## 2023-06-13 PROCEDURE — 97112 NEUROMUSCULAR REEDUCATION: CPT | Performed by: PHYSICAL THERAPIST

## 2023-06-13 NOTE — PROGRESS NOTES
"Daily Note     Today's date: 2023  Patient name: Lew Atkinson  : 1960  MRN: [id number]  Referring provider: Terrance Diane  Dx:   Encounter Diagnosis     ICD-10-CM    1  Thoracic radiculopathy  M54 14       2  Mid back pain  M54 9                      Subjective: Pt reports no new changes  Objective: See treatment diary below      Assessment: Pt has good knowledge of program and offers no complaints t/o session  Plan: Continue per plan of care        Precautions: multiple lumbar spine surgeries      Manuals                                            Neuro Re-Ed          Posture tband Blue 20x,    Low for final position Blue x20ea Blue x20ea   Blue  20x ea Blue 20x ea   Cane ext with scap sq 10x10\" 10x10\" 10x10\"   10x10\" 10x10\"   TA                                                  Ther Ex          UBE (back) 8 8' 8'   8' 8'   LTR          PPT with ball sq 5\"x20 5\"x20 5\"x20   5\"x20 5\"x20   Bridging 5\"x20 5\"x20 5\"x20   5\"x20 5\"x20   Supine SLR 3x30\" x20 ea x20ea   x20ea x20ea   Doorway stretch 3x30\" 3s30\" 3x30\"   3x30\" 3x30\"   Seated thoracic stretch [id number]\"/ EX 30\"x3 Rot 10x10\" ea  Ext 3x30\" Rot 10x10\" ea  Ext 3x30\"   3x30\" 3x30\"   Thoracic extension elbows against wall 5\"x20 5\"x20 5\"x20   5\"x20 5\"x20   SL clamshells BTB 5\"x20 BTB 5\"x20 BTB 5\"x20   GTB  5\"x20 ea BTB 5\"x20   Prone press up T/S focus          Prone prop          Ther Activity                                                                                               "

## 2023-06-15 ENCOUNTER — OFFICE VISIT (OUTPATIENT)
Dept: PHYSICAL THERAPY | Facility: CLINIC | Age: 63
End: 2023-06-15
Payer: MEDICARE

## 2023-06-15 DIAGNOSIS — M54.9 MID BACK PAIN: ICD-10-CM

## 2023-06-15 DIAGNOSIS — M54.14 THORACIC RADICULOPATHY: Primary | ICD-10-CM

## 2023-06-15 PROCEDURE — 97110 THERAPEUTIC EXERCISES: CPT | Performed by: PHYSICAL THERAPIST

## 2023-06-15 PROCEDURE — 97112 NEUROMUSCULAR REEDUCATION: CPT | Performed by: PHYSICAL THERAPIST

## 2023-06-15 NOTE — PROGRESS NOTES
"Daily Note     Today's date: 6/15/2023  Patient name: Byron Sears  : 1960  MRN: [id number]  Referring provider: Ken Thakur  Dx:   Encounter Diagnosis     ICD-10-CM    1  Thoracic radiculopathy  M54 14       2  Mid back pain  M54 9                      Subjective: Pt reports no significant changes  Objective: See treatment diary below      Assessment: Pt had good tolerance to all activities  No pain reported t/o session  Plan: Continue per plan of care        Precautions: multiple lumbar spine surgeries      Manuals 6/6 6/8 6/13 6/15                                              Neuro Re-Ed          Posture tband Blue 20x,    Low for final position Blue x20ea Blue x20ea Blue x20ea      Cane ext with scap sq 10x10\" 10x10\" 10x10\" 10x10\"      TA                                                  Ther Ex          UBE (back) 8 8' 8' 8'      LTR          PPT with ball sq 5\"x20 5\"x20 5\"x20 5\"x20      Bridging 5\"x20 5\"x20 5\"x20 5\"x20      Supine SLR 3x30\" x20 ea x20ea x20      Doorway stretch 3x30\" 3s30\" 3x30\" 3x30\"      Seated thoracic stretch [id number]\"/ EX 30\"x3 Rot 10x10\" ea  Ext 3x30\" Rot 10x10\" ea  Ext 3x30\" Rot 10x10\" ea  Ext 3x30\"      Thoracic extension elbows against wall 5\"x20 5\"x20 5\"x20 5\"x20      SL clamshells BTB 5\"x20 BTB 5\"x20 BTB 5\"x20 BTB 5\"x20      Prone press up T/S focus          Prone prop          Ther Activity                                                                                               "

## 2023-06-19 ENCOUNTER — OFFICE VISIT (OUTPATIENT)
Dept: PHYSICAL THERAPY | Facility: CLINIC | Age: 63
End: 2023-06-19
Payer: MEDICARE

## 2023-06-19 DIAGNOSIS — M54.9 MID BACK PAIN: ICD-10-CM

## 2023-06-19 DIAGNOSIS — M54.14 THORACIC RADICULOPATHY: Primary | ICD-10-CM

## 2023-06-19 PROCEDURE — 97112 NEUROMUSCULAR REEDUCATION: CPT | Performed by: PHYSICAL THERAPIST

## 2023-06-19 PROCEDURE — 97110 THERAPEUTIC EXERCISES: CPT | Performed by: PHYSICAL THERAPIST

## 2023-06-19 NOTE — PROGRESS NOTES
"Daily Note     Today's date: 2023  Patient name: Josee Davis  : 1960  MRN: [id number]  Referring provider: Cj Lerma  Dx:   Encounter Diagnosis     ICD-10-CM    1  Thoracic radiculopathy  M54 14       2  Mid back pain  M54 9                      Subjective: Pt reports that she continues to do well overall  She states that pain is well managed thus far  Objective: See treatment diary below      Assessment: Pt tolerates current program with no complaints  Pt is responding to POC per pt subjective of pain control  Pt will benefit from continued skilled PT  Plan: Continue per plan of care        Precautions: multiple lumbar spine surgeries      Manuals 6/6 6/8 6/13 6/15 6/19                                             Neuro Re-Ed          Posture tband Blue 20x,    Low for final position Blue x20ea Blue x20ea Blue x20ea Blue x20ea     Cane ext with scap sq 10x10\" 10x10\" 10x10\" 10x10\" 10x10\"     TA                                                  Ther Ex          UBE (back) 8 8' 8' 8' 8'     LTR          PPT with ball sq 5\"x20 5\"x20 5\"x20 5\"x20 5\"x20     Bridging 5\"x20 5\"x20 5\"x20 5\"x20 5\"x20     Supine SLR 3x30\" x20 ea x20ea x20 x20     Doorway stretch 3x30\" 3s30\" 3x30\" 3x30\" 3x30\"     Seated thoracic stretch [id number]\"/ EX 30\"x3 Rot 10x10\" ea  Ext 3x30\" Rot 10x10\" ea  Ext 3x30\" Rot 10x10\" ea  Ext 3x30\" Rot 10x10\" ea  Ext 3x30\"     Thoracic extension elbows against wall 5\"x20 5\"x20 5\"x20 5\"x20 5\"x20     SL clamshells BTB 5\"x20 BTB 5\"x20 BTB 5\"x20 BTB 5\"x20 BTB 5\"x20     Prone press up T/S focus          Prone prop          Ther Activity                                                                                               "

## 2023-06-20 ENCOUNTER — APPOINTMENT (OUTPATIENT)
Dept: PHYSICAL THERAPY | Facility: CLINIC | Age: 63
End: 2023-06-20
Payer: MEDICARE

## 2023-06-22 ENCOUNTER — OFFICE VISIT (OUTPATIENT)
Dept: PHYSICAL THERAPY | Facility: CLINIC | Age: 63
End: 2023-06-22
Payer: MEDICARE

## 2023-06-22 DIAGNOSIS — M54.14 THORACIC RADICULOPATHY: Primary | ICD-10-CM

## 2023-06-22 DIAGNOSIS — M54.50 CHRONIC BILATERAL LOW BACK PAIN WITHOUT SCIATICA: ICD-10-CM

## 2023-06-22 DIAGNOSIS — M54.9 MID BACK PAIN: ICD-10-CM

## 2023-06-22 DIAGNOSIS — M51.26 LUMBAR DISC HERNIATION: ICD-10-CM

## 2023-06-22 DIAGNOSIS — G89.29 CHRONIC BILATERAL LOW BACK PAIN WITHOUT SCIATICA: ICD-10-CM

## 2023-06-22 PROCEDURE — 97110 THERAPEUTIC EXERCISES: CPT

## 2023-06-22 PROCEDURE — 97112 NEUROMUSCULAR REEDUCATION: CPT

## 2023-06-22 NOTE — PROGRESS NOTES
"Daily Note     Today's date: 2023  Patient name: Dani Olszewski  : 1960  MRN: [id number]  Referring provider: Susan Amado  Dx:   Encounter Diagnosis     ICD-10-CM    1  Thoracic radiculopathy  M54 14       2  Mid back pain  M54 9       3  Lumbar disc herniation  M51 26       4  Chronic bilateral low back pain without sciatica  M54 50     G89 29           Start Time: 918  Stop Time:   Total time in clinic (min): 37 minutes    Subjective: Patient reports that she continues to have intermittent discomfort into thoracic spine  She denies any complaints of pain presently, however, had soreness along distal thoracic spine yesterday  She is concerned about loss of muscle mass, especially in UE  Objective: See treatment diary below      Assessment: Tolerated treatment well  Added hold time to TB postural strengthening to avoid fast pacing and improper muscular recruitment  Patient demonstrated fatigue post treatment, exhibited good technique with therapeutic exercises and would benefit from continued PT      Plan: Continue per plan of care  Progress treatment as tolerated  Advance program as able and appropriate for functional strengthening and to achieve patient goal of returning to working out        Precautions: multiple lumbar spine surgeries      Manuals  6/8 6/13 6/15 6/19 6/22                                            Neuro Re-Ed          Posture tband Blue 20x,    Low for final position Blue x20ea Blue x20ea Blue x20ea Blue x20ea Blue 3\"x20 ea    Cane ext with scap sq 10x10\" 10x10\" 10x10\" 10x10\" 10x10\" 10\"x10    TA                                                  Ther Ex          UBE (back) 8 8' 8' 8' 8' 8\"    LTR          PPT with ball sq 5\"x20 5\"x20 5\"x20 5\"x20 5\"x20 5\"x20    Bridging 5\"x20 5\"x20 5\"x20 5\"x20 5\"x20 5\"x20    Supine SLR 3x30\" x20 ea x20ea x20 x20 With PPT 20x ea    Doorway stretch 3x30\" 3s30\" 3x30\" 3x30\" 3x30\" 30\"x3    Seated thoracic stretch [id number]\"/ EX " "30\"x3 Rot 10x10\" ea  Ext 3x30\" Rot 10x10\" ea  Ext 3x30\" Rot 10x10\" ea  Ext 3x30\" Rot 10x10\" ea  Ext 3x30\" Rot 10x10\" ea  Ext 3x30\"    Thoracic extension elbows against wall 5\"x20 5\"x20 5\"x20 5\"x20 5\"x20 5\"x20    SL clamshells BTB 5\"x20 BTB 5\"x20 BTB 5\"x20 BTB 5\"x20 BTB 5\"x20 BTB 5\" 20x    Prone press up T/S focus          Prone prop          Ther Activity                                                                                               "

## 2023-06-27 ENCOUNTER — OFFICE VISIT (OUTPATIENT)
Dept: PHYSICAL THERAPY | Facility: CLINIC | Age: 63
End: 2023-06-27
Payer: MEDICARE

## 2023-06-27 DIAGNOSIS — M54.14 THORACIC RADICULOPATHY: Primary | ICD-10-CM

## 2023-06-27 DIAGNOSIS — M54.9 MID BACK PAIN: ICD-10-CM

## 2023-06-27 PROCEDURE — 97112 NEUROMUSCULAR REEDUCATION: CPT | Performed by: PHYSICAL THERAPIST

## 2023-06-27 PROCEDURE — 97110 THERAPEUTIC EXERCISES: CPT | Performed by: PHYSICAL THERAPIST

## 2023-06-27 NOTE — PROGRESS NOTES
"Daily Note     Today's date: 2023  Patient name: Daniel Pillai  : 1960  MRN: [id number]  Referring provider: Candy Cline  Dx:   Encounter Diagnosis     ICD-10-CM    1  Thoracic radiculopathy  M54 14       2  Mid back pain  M54 9                      Subjective: Pt reports she's been feeling pretty good and would like to progress NV  Objective: See treatment diary below      Assessment: Pt had good tolerance to all tx  Will progress NV  Plan: Continue per plan of care        Precautions: multiple lumbar spine surgeries      Manuals 6/8 6/13 6/15 6/19 6/22 6/27                                                 Neuro Re-Ed           Posture tband Blue x20ea Blue x20ea Blue x20ea Blue x20ea Blue 3\"x20 ea Blue x20 ea seated on pball    Cane ext with scap sq 10x10\" 10x10\" 10x10\" 10x10\" 10\"x10 10x10\"     TA      NV     Palloff press      NV     Tband rotation                                 Ther Ex           UBE (back) 8' 8' 8' 8' 8\" 8'     LTR           PPT with ball sq 5\"x20 5\"x20 5\"x20 5\"x20 5\"x20 5\"x20     Bridging 5\"x20 5\"x20 5\"x20 5\"x20 5\"x20 5\"x20     Supine SLR x20 ea x20ea x20 x20 With PPT 20x ea x20ea     Doorway stretch 3s30\" 3x30\" 3x30\" 3x30\" 30\"x3 30\"x3     Seated thoracic stretch Rot 10x10\" ea  Ext 3x30\" Rot 10x10\" ea  Ext 3x30\" Rot 10x10\" ea  Ext 3x30\" Rot 10x10\" ea  Ext 3x30\" Rot 10x10\" ea  Ext 3x30\" Rot 10x10\" ea  Ext 3x30\"     Thoracic extension elbows against wall 5\"x20 5\"x20 5\"x20 5\"x20 5\"x20 5\"x20     SL clamshells BTB 5\"x20 BTB 5\"x20 BTB 5\"x20 BTB 5\"x20 BTB 5\" 20x BTB 5\"x20     Prone ER ball sq      NV                Ther Activity                                                                                                        "

## 2023-06-29 ENCOUNTER — OFFICE VISIT (OUTPATIENT)
Dept: PHYSICAL THERAPY | Facility: CLINIC | Age: 63
End: 2023-06-29
Payer: MEDICARE

## 2023-06-29 DIAGNOSIS — M54.14 THORACIC RADICULOPATHY: Primary | ICD-10-CM

## 2023-06-29 DIAGNOSIS — M54.9 MID BACK PAIN: ICD-10-CM

## 2023-06-29 PROCEDURE — 97112 NEUROMUSCULAR REEDUCATION: CPT | Performed by: PHYSICAL THERAPIST

## 2023-06-29 PROCEDURE — 97110 THERAPEUTIC EXERCISES: CPT | Performed by: PHYSICAL THERAPIST

## 2023-06-29 NOTE — PROGRESS NOTES
"Daily Note     Today's date: 2023  Patient name: Bryce Mart  : 1960  MRN: [id number]  Referring provider: Diego Jimenez  Dx:   Encounter Diagnosis     ICD-10-CM    1  Thoracic radiculopathy  M54 14       2  Mid back pain  M54 9                      Subjective: Pt reports no new changes  Objective: See treatment diary below      Assessment: Pt had good tolerance to progression of program  Reported fatigue post session  Plan: Continue per plan of care        Precautions: multiple lumbar spine surgeries      Manuals 6/13 6/15 6/19 6/22 6/27 6/29                                            Neuro Re-Ed          Posture tband Blue x20ea Blue x20ea Blue x20ea Blue 3\"x20 ea Blue x20 ea seated on pball blue x20ea    Cane ext with scap sq 10x10\" 10x10\" 10x10\" 10\"x10 10x10\" 10x10\"              Palloff press     NV GTB x10ea    Tband rotation      GTB x10ea                        Ther Ex          UBE (back) 8' 8' 8' 8\" 8' 8'    LTR          PPT with ball sq 5\"x20 5\"x20 5\"x20 5\"x20 5\"x20 5\"x20    Bridging 5\"x20 5\"x20 5\"x20 5\"x20 5\"x20 5\"x20    Supine SLR x20ea x20 x20 With PPT 20x ea x20ea x20ea    Doorway stretch 3x30\" 3x30\" 3x30\" 30\"x3 30\"x3 30\"x3    Seated thoracic stretch Rot 10x10\" ea  Ext 3x30\" Rot 10x10\" ea  Ext 3x30\" Rot 10x10\" ea  Ext 3x30\" Rot 10x10\" ea  Ext 3x30\" Rot 10x10\" ea  Ext 3x30\" Rot 10x10\" ea  Ext 3x30\"    Thoracic extension elbows against wall 5\"x20 5\"x20 5\"x20 5\"x20 5\"x20 5\"x20    SL clamshells BTB 5\"x20 BTB 5\"x20 BTB 5\"x20 BTB 5\" 20x BTB 5\"x20 BTB 5\"x20    Prone ER ball sq     NV               Ther Activity                                                                                               "

## 2023-07-03 ENCOUNTER — OFFICE VISIT (OUTPATIENT)
Dept: PHYSICAL THERAPY | Facility: CLINIC | Age: 63
End: 2023-07-03
Payer: MEDICARE

## 2023-07-03 DIAGNOSIS — M54.14 THORACIC RADICULOPATHY: Primary | ICD-10-CM

## 2023-07-03 DIAGNOSIS — M51.26 LUMBAR DISC HERNIATION: ICD-10-CM

## 2023-07-03 DIAGNOSIS — M54.9 MID BACK PAIN: ICD-10-CM

## 2023-07-03 DIAGNOSIS — M54.50 CHRONIC BILATERAL LOW BACK PAIN WITHOUT SCIATICA: ICD-10-CM

## 2023-07-03 DIAGNOSIS — G89.29 CHRONIC BILATERAL LOW BACK PAIN WITHOUT SCIATICA: ICD-10-CM

## 2023-07-03 PROCEDURE — 97112 NEUROMUSCULAR REEDUCATION: CPT

## 2023-07-03 PROCEDURE — 97110 THERAPEUTIC EXERCISES: CPT

## 2023-07-03 NOTE — PROGRESS NOTES
Daily Note     Today's date: 7/3/2023  Patient name: Dayanna Monique  : 1960  MRN: [id number]  Referring provider: Alice Em  Dx:   Encounter Diagnosis     ICD-10-CM    1. Thoracic radiculopathy  M54.14       2. Mid back pain  M54.9       3. Lumbar disc herniation  M51.26       4. Chronic bilateral low back pain without sciatica  M54.50     G89.29                      Subjective: Pt states she's been feeling "good". Notes she cont to experience cramping in B LE, L>R, especially at night. Objective: See treatment diary below    Assessment: Notes soreness after performing GTB rotations. Performed remaining ex program w/o complaint. Will monitor. Cont progression of exercises as appropriate. Plan: Cont per POC.      Precautions: multiple lumbar spine surgeries      Manuals 6/13 6/15 6/19 6/22 6/27 6/29 7/3                                           Neuro Re-Ed          Posture tband Blue x20ea Blue x20ea Blue x20ea Blue 3"x20 ea Blue x20 ea seated on pball blue x20ea seated on pball blue  x20 ea   Cane ext with scap sq 10x10" 10x10" 10x10" 10"x10 10x10" 10x10" 10x10"             Palloff press     NV GTB x10ea GTB  x10 ea   Tband rotation      GTB x10ea GTB  x10 ea                       Ther Ex          UBE (back) 8' 8' 8' 8" 8' 8' 8'   LTR          PPT with ball sq 5"x20 5"x20 5"x20 5"x20 5"x20 5"x20 5"x20   Bridging 5"x20 5"x20 5"x20 5"x20 5"x20 5"x20 5"x20   Supine SLR x20ea x20 x20 With PPT 20x ea x20ea x20ea x20 ea   Doorway stretch 3x30" 3x30" 3x30" 30"x3 30"x3 30"x3 30"x3   Seated thoracic stretch Rot 10x10" ea  Ext 3x30" Rot 10x10" ea  Ext 3x30" Rot 10x10" ea  Ext 3x30" Rot 10x10" ea  Ext 3x30" Rot 10x10" ea  Ext 3x30" Rot 10x10" ea  Ext 3x30" Rot q  10x10" ea, ext 3x30"   Thoracic extension elbows against wall 5"x20 5"x20 5"x20 5"x20 5"x20 5"x20 5"x20   SL clamshells BTB 5"x20 BTB 5"x20 BTB 5"x20 BTB 5" 20x BTB 5"x20 BTB 5"x20 BTB  5"x20   Prone ER ball sq     NV               Ther Activity

## 2023-07-06 ENCOUNTER — OFFICE VISIT (OUTPATIENT)
Dept: PHYSICAL THERAPY | Facility: CLINIC | Age: 63
End: 2023-07-06
Payer: MEDICARE

## 2023-07-06 DIAGNOSIS — M54.50 CHRONIC BILATERAL LOW BACK PAIN WITHOUT SCIATICA: ICD-10-CM

## 2023-07-06 DIAGNOSIS — G89.29 CHRONIC BILATERAL LOW BACK PAIN WITHOUT SCIATICA: ICD-10-CM

## 2023-07-06 DIAGNOSIS — M51.26 LUMBAR DISC HERNIATION: ICD-10-CM

## 2023-07-06 DIAGNOSIS — M54.14 THORACIC RADICULOPATHY: Primary | ICD-10-CM

## 2023-07-06 DIAGNOSIS — M54.9 MID BACK PAIN: ICD-10-CM

## 2023-07-06 PROCEDURE — 97112 NEUROMUSCULAR REEDUCATION: CPT

## 2023-07-06 PROCEDURE — 97110 THERAPEUTIC EXERCISES: CPT

## 2023-07-06 NOTE — PROGRESS NOTES
Daily Note     Today's date: 2023  Patient name: Raya Treviño  : 1960  MRN: [id number]  Referring provider: Marco Mcdowell  Dx:   Encounter Diagnosis     ICD-10-CM    1. Thoracic radiculopathy  M54.14       2. Mid back pain  M54.9       3. Lumbar disc herniation  M51.26       4. Chronic bilateral low back pain without sciatica  M54.50     G89.29                      Subjective: Pt c/o feeling tired today, no c/o pain. Objective: See treatment diary below    Assessment: Performed exercise progressions and remaining ex program w/o difficulty or discomfort. Will monitor. Plan: Cont per POC.      Precautions: multiple lumbar spine surgeries      Manuals  7/6  6/19 6/22 6/27 6/29 7/3                                           Neuro Re-Ed          Posture tband Seated on pball blue x20 ea  Blue x20ea Blue 3"x20 ea Blue x20 ea seated on pball blue x20ea seated on pball blue  x20 ea   Cane ext with scap sq 10x10"  10x10" 10"x10 10x10" 10x10" 10x10"             Palloff press Blue x10 ea    NV GTB x10ea GTB  x10 ea   Tband rotation Blue  x10 ea     GTB x10ea GTB  x10 ea                       Ther Ex          UBE (back) 8'  8' 8" 8' 8' 8'   LTR          PPT with ball sq 5"x20  5"x20 5"x20 5"x20 5"x20 5"x20   Bridging 5"x20  5"x20 5"x20 5"x20 5"x20 5"x20   Supine SLR   x20 With PPT 20x ea x20ea x20ea x20 ea   Doorway stretch 30"x3  3x30" 30"x3 30"x3 30"x3 30"x3   Seated thoracic stretch Rot 10x10" ea;  ext 3x30"  Rot 10x10" ea  Ext 3x30" Rot 10x10" ea  Ext 3x30" Rot 10x10" ea  Ext 3x30" Rot 10x10" ea  Ext 3x30" Rot q  10x10" ea, ext 3x30"   Thoracic extension elbows against wall 5"x20  5"x20 5"x20 5"x20 5"x20 5"x20   SL clamshells BTB  5"x20  BTB 5"x20 BTB 5" 20x BTB 5"x20 BTB 5"x20 BTB  5"x20   Prone ER ball sq     NV               Ther Activity

## 2023-07-11 ENCOUNTER — APPOINTMENT (OUTPATIENT)
Dept: PHYSICAL THERAPY | Facility: CLINIC | Age: 63
End: 2023-07-11
Payer: MEDICARE

## 2023-07-12 ENCOUNTER — APPOINTMENT (OUTPATIENT)
Dept: PHYSICAL THERAPY | Facility: CLINIC | Age: 63
End: 2023-07-12
Payer: MEDICARE

## 2023-07-13 ENCOUNTER — APPOINTMENT (OUTPATIENT)
Dept: PHYSICAL THERAPY | Facility: CLINIC | Age: 63
End: 2023-07-13
Payer: MEDICARE

## 2023-07-17 ENCOUNTER — OFFICE VISIT (OUTPATIENT)
Dept: PHYSICAL THERAPY | Facility: CLINIC | Age: 63
End: 2023-07-17
Payer: MEDICARE

## 2023-07-17 DIAGNOSIS — M54.9 MID BACK PAIN: ICD-10-CM

## 2023-07-17 DIAGNOSIS — M54.14 THORACIC RADICULOPATHY: Primary | ICD-10-CM

## 2023-07-17 PROCEDURE — 97110 THERAPEUTIC EXERCISES: CPT

## 2023-07-17 PROCEDURE — 97112 NEUROMUSCULAR REEDUCATION: CPT

## 2023-07-17 NOTE — PROGRESS NOTES
Daily Note     Today's date: 2023  Patient name: Kami Goff  : 1960  MRN: [id number]  Referring provider: Alvarez Nicole  Dx:   Encounter Diagnosis     ICD-10-CM    1. Thoracic radiculopathy  M54.14       2. Mid back pain  M54.9       3. Lumbar disc herniation  M51.26       4. Chronic bilateral low back pain without sciatica  M54.50     G89.29                      Subjective: Pt reports feeling tired, she had her granddaughter over the weekend. Notes she is feeling so much better with therapy and the injections she rec. Objective: See treatment diary below    Assessment: Performed exercise program w/o complaint. Cont to demonstrate good knowledge of same. Will monitor. Plan: Cont per POC.     Precautions: multiple lumbar spine surgeries      Manuals  7/6 7/17  6/22 6/27 6/29 7/3                                           Neuro Re-Ed          Posture tband Seated on pball blue x20 ea Seated on pball blue   x20 ea  Blue 3"x20 ea Blue x20 ea seated on pball blue x20ea seated on pball blue  x20 ea   Cane ext with scap sq 10x10" 10x10"  10"x10 10x10" 10x10" 10x10"             Palloff press Blue x10 ea Blue  x10ea   NV GTB x10ea GTB  x10 ea   Tband rotation Blue  x10 ea Blue  x10ea    GTB x10ea GTB  x10 ea                       Ther Ex          UBE (back) 8' 8'  8" 8' 8' 8'   LTR          PPT with ball sq 5"x20 5"x20  5"x20 5"x20 5"x20 5"x20   Bridging 5"x20 5"x20  5"x20 5"x20 5"x20 5"x20   Supine SLR  x20 ea  With PPT 20x ea x20ea x20ea x20 ea   Doorway stretch 30"x3 30"x3  30"x3 30"x3 30"x3 30"x3   Seated thoracic stretch Rot 10x10" ea;  ext 3x30" Rot. 10x10"; ext 3x30"  Rot 10x10" ea  Ext 3x30" Rot 10x10" ea  Ext 3x30" Rot 10x10" ea  Ext 3x30" Rot q  10x10" ea, ext 3x30"   Thoracic extension elbows against wall 5"x20 5"x20  5"x20 5"x20 5"x20 5"x20   SL clamshells BTB  5"x20 BTB  5"x20  BTB 5" 20x BTB 5"x20 BTB 5"x20 BTB  5"x20   Prone ER ball sq     NV               Ther Activity

## 2023-07-20 ENCOUNTER — OFFICE VISIT (OUTPATIENT)
Dept: PHYSICAL THERAPY | Facility: CLINIC | Age: 63
End: 2023-07-20
Payer: MEDICARE

## 2023-07-20 DIAGNOSIS — M54.14 THORACIC RADICULOPATHY: Primary | ICD-10-CM

## 2023-07-20 DIAGNOSIS — M54.9 MID BACK PAIN: ICD-10-CM

## 2023-07-20 PROCEDURE — 97112 NEUROMUSCULAR REEDUCATION: CPT | Performed by: PHYSICAL THERAPIST

## 2023-07-20 PROCEDURE — 97110 THERAPEUTIC EXERCISES: CPT | Performed by: PHYSICAL THERAPIST

## 2023-07-20 NOTE — PROGRESS NOTES
Daily Note     Today's date: 2023  Patient name: Pat Polalrd  : 1960  MRN: [id number]  Referring provider: Osiris Donovan  Dx:   Encounter Diagnosis     ICD-10-CM    1. Thoracic radiculopathy  M54.14       2. Mid back pain  M54.9                      Subjective: Pt reports feeling much better. Overall improvement with PT tx verbalized. No pain. Objective: See treatment diary below      Assessment: Tolerated treatment well. Patient exhibited good technique with therapeutic exercises and would benefit from continued PT as per primary PT's POC. No pain pos tx voiced. Pt is safe with all therex. Plan: Continue per plan of care. Cont per POC.     Precautions: multiple lumbar spine surgeries      Manuals                             Neuro Re-Ed      Posture tband Seated on pball blue x20 ea Seated on pball blue   x20 ea seated on swiss ball, blue TB 20x5" ea   Cane ext with scap sq 10x10" 10x10" 15x10"         Palloff press Blue x10 ea Blue  x10ea GTB x10ea side   Tband rotation Blue  x10 ea Blue  x10ea GTB x10x ea side               Ther Ex      UBE (back) 8' 8' 8'   LTR      PPT with ball sq 5"x20 5"x20 5"x20   Bridging 5"x20 5"x20 5"x20   Supine SLR  x20 ea x20 ea   Doorway stretch 30"x3 30"x3    Seated thoracic stretch Rot 10x10" ea;  ext 3x30" Rot. 10x10"; ext 3x30" Rot. 10x10" ext 3x30"   Thoracic extension elbows against wall 5"x20 5"x20 5"x20   SL clamshells BTB  5"x20 BTB  5"x20 B TB 5"x20   Prone ER ball sq            Ther Activity

## 2023-07-24 ENCOUNTER — OFFICE VISIT (OUTPATIENT)
Dept: PHYSICAL THERAPY | Facility: CLINIC | Age: 63
End: 2023-07-24
Payer: MEDICARE

## 2023-07-24 DIAGNOSIS — M51.26 LUMBAR DISC HERNIATION: ICD-10-CM

## 2023-07-24 DIAGNOSIS — M54.14 THORACIC RADICULOPATHY: Primary | ICD-10-CM

## 2023-07-24 DIAGNOSIS — G89.29 CHRONIC BILATERAL LOW BACK PAIN WITHOUT SCIATICA: ICD-10-CM

## 2023-07-24 DIAGNOSIS — M54.9 MID BACK PAIN: ICD-10-CM

## 2023-07-24 DIAGNOSIS — M54.50 CHRONIC BILATERAL LOW BACK PAIN WITHOUT SCIATICA: ICD-10-CM

## 2023-07-24 PROCEDURE — 97110 THERAPEUTIC EXERCISES: CPT

## 2023-07-24 PROCEDURE — 97112 NEUROMUSCULAR REEDUCATION: CPT

## 2023-07-24 NOTE — PROGRESS NOTES
Daily Note     Today's date: 2023  Patient name: Karlene Baldwin  : 1960  MRN: [id number]  Referring provider: Jason Mccallum  Dx:   Encounter Diagnosis     ICD-10-CM    1. Thoracic radiculopathy  M54.14       2. Mid back pain  M54.9       3. Lumbar disc herniation  M51.26       4. Chronic bilateral low back pain without sciatica  M54.50     G89.29           Start Time: 0845  Stop Time: 915  Total time in clinic (min): 30 minutes      Subjective: Patient states, "I'm slowly gaining over time."    Patient reports she is experiencing less back pain and muscle spasms. Objective: See treatment diary below. Assessment: Patient is doing well with progression of therapeutic exercise program.      Plan: Continue treatment as per PT plan of care.        Precautions: multiple lumbar spine surgeries      Manuals                                      Neuro Re-Ed        Posture tband Seated on pball blue x20 ea Seated on pball blue   x20 ea seated on swiss ball, blue TB 20x5" ea seated on pball  30 ea    Cane ext with scap sq 10x10" 10x10" 15x10" 10"x10            Palloff press Blue x10 ea Blue  x10ea GTB x10ea side blue  15 ea    Tband rotation Blue  x10 ea Blue  x10ea GTB x10x ea side blue  15 ea                    Ther Ex        UBE (back) 8' 8' 8' 8'    LTR        PPT with ball sq 5"x20 5"x20 5"x20 5"x20    Bridging 5"x20 5"x20 5"x20 5"x20    Supine SLR  x20 ea x20 ea 20 ea    Doorway stretch 30"x3 30"x3  30"x3    Seated thoracic stretch Rot 10x10" ea;  ext 3x30" Rot. 10x10"; ext 3x30" Rot. 10x10" ext 3x30" rot 10x10" ext 3x30"    thoracic extension elbows against wall 5"x20 5"x20 5"x20 5"x20    SL clamshells BTB  5"x20 BTB  5"x20 B TB 5"x20 blue  5"x20    Prone ER ball sq                        Ther Activity

## 2023-07-27 ENCOUNTER — OFFICE VISIT (OUTPATIENT)
Dept: PHYSICAL THERAPY | Facility: CLINIC | Age: 63
End: 2023-07-27
Payer: MEDICARE

## 2023-07-27 DIAGNOSIS — M54.14 THORACIC RADICULOPATHY: Primary | ICD-10-CM

## 2023-07-27 DIAGNOSIS — M54.9 MID BACK PAIN: ICD-10-CM

## 2023-07-27 DIAGNOSIS — G89.29 CHRONIC BILATERAL LOW BACK PAIN WITHOUT SCIATICA: ICD-10-CM

## 2023-07-27 DIAGNOSIS — M54.50 CHRONIC BILATERAL LOW BACK PAIN WITHOUT SCIATICA: ICD-10-CM

## 2023-07-27 DIAGNOSIS — M51.26 LUMBAR DISC HERNIATION: ICD-10-CM

## 2023-07-27 PROCEDURE — 97112 NEUROMUSCULAR REEDUCATION: CPT

## 2023-07-27 PROCEDURE — 97110 THERAPEUTIC EXERCISES: CPT

## 2023-07-27 NOTE — PROGRESS NOTES
Daily Note     Today's date: 2023  Patient name: Nickola Felty  : 1960  MRN: [id number]  Referring provider: Lucy Limon  Dx:   Encounter Diagnosis     ICD-10-CM    1. Thoracic radiculopathy  M54.14       2. Mid back pain  M54.9       3. Lumbar disc herniation  M51.26       4. Chronic bilateral low back pain without sciatica  M54.50     G89.29           Start Time: 849  Stop Time: 928  Total time in clinic (min): 39 minutes      Subjective: Patient reports mild pain in her neck this morning. Objective: See treatment diary below. Assessment: Patient is doing well with therapeutic exercise program.  Progression to the black theraband for clamshells tolerated well. Plan: Continue treatment as per PT plan of care.        Precautions: multiple lumbar spine surgeries      Manuals                                          Neuro Re-Ed         Posture tband Seated on pball blue x20 ea0 Seated on pball blue   x20 ea seated on swiss ball, blue TB 20x5" ea seated on pball  30 ea seated on pball  30 ea    Cane ext with scap sq 10x10" 10x10" 15x10" 10"x10 10"x10             Palloff press Blue x10 ea Blue  x10ea GTB x10ea side blue  15 ea blue  15 ea    Tband rotation Blue  x10 ea Blue  x10ea GTB x10x ea side blue  15 ea blue  15 ea                      Ther Ex         UBE (back) 8' 8' 8' 8' 8'    LTR         PPT with ball sq 5"x20 5"x20 5"x20 5"x20 5"x20    Bridging 5"x20 5"x20 5"x20 5"x20 5"x20    Supine SLR  x20 ea x20 ea 20 ea 20 ea    Doorway stretch 30"x3 30"x3  30"x3 30"x3    Seated thoracic stretch Rot 10x10" ea;  ext 3x30" Rot. 10x10"; ext 3x30" Rot. 10x10" ext 3x30" rot 10x10" ext 3x30" rot 10x10" ext 3x30"    thoracic extension elbows against wall 5"x20 5"x20 5"x20 5"x20 5"x20    SL clamshells BTB  5"x20 BTB  5"x20 B TB 5"x20 blue  5"x20 black  5"x20    Prone ER ball sq                           Ther Activity

## 2023-07-31 ENCOUNTER — OFFICE VISIT (OUTPATIENT)
Dept: PHYSICAL THERAPY | Facility: CLINIC | Age: 63
End: 2023-07-31
Payer: MEDICARE

## 2023-07-31 DIAGNOSIS — M54.9 MID BACK PAIN: ICD-10-CM

## 2023-07-31 DIAGNOSIS — G89.29 CHRONIC BILATERAL LOW BACK PAIN WITHOUT SCIATICA: ICD-10-CM

## 2023-07-31 DIAGNOSIS — M54.50 CHRONIC BILATERAL LOW BACK PAIN WITHOUT SCIATICA: ICD-10-CM

## 2023-07-31 DIAGNOSIS — M51.26 LUMBAR DISC HERNIATION: ICD-10-CM

## 2023-07-31 DIAGNOSIS — M54.14 THORACIC RADICULOPATHY: Primary | ICD-10-CM

## 2023-07-31 PROCEDURE — 97110 THERAPEUTIC EXERCISES: CPT

## 2023-07-31 PROCEDURE — 97112 NEUROMUSCULAR REEDUCATION: CPT

## 2023-07-31 NOTE — PROGRESS NOTES
Daily Note     Today's date: 2023  Patient name: Sally Fry  : 1960  MRN: [id number]  Referring provider: Melissa Garg  Dx:   Encounter Diagnosis     ICD-10-CM    1. Thoracic radiculopathy  M54.14       2. Mid back pain  M54.9       3. Lumbar disc herniation  M51.26       4. Chronic bilateral low back pain without sciatica  M54.50     G89.29                      Subjective: Pt reports she is doing ok, she hurt her toe this weekend and her neck has been hurting as well, but her back is doing ok. Objective: See treatment diary below      Assessment: Tolerated treatment well. Pt performed all exercises without issue, continue to progress to tolerance. Pt demonstrated good form with exercises and had strong understanding of exercise program. Patient demonstrated fatigue post treatment, exhibited good technique with therapeutic exercises and would benefit from continued PT      Plan: Continue per plan of care.       Precautions: multiple lumbar spine surgeries      Manuals                                         Neuro Re-Ed         Posture tband Seated on pball blue x20 ea0 Seated on pball blue   x20 ea seated on swiss ball, blue TB 20x5" ea seated on pball  30 ea seated on pball  30 ea Seated on pball 30x ea   Cane ext with scap sq 10x10" 10x10" 15x10" 10"x10 10"x10 10x10"            Palloff press Blue x10 ea Blue  x10ea GTB x10ea side blue  15 ea blue  15 ea Blue 15x ea   Tband rotation Blue  x10 ea Blue  x10ea GTB x10x ea side blue  15 ea blue  15 ea Blue 15x ea                     Ther Ex         UBE (back) 8' 8' 8' 8' 8' 8'   LTR         PPT with ball sq 5"x20 5"x20 5"x20 5"x20 5"x20 5"x20   Bridging 5"x20 5"x20 5"x20 5"x20 5"x20 5"x20   Supine SLR  x20 ea x20 ea 20 ea 20 ea 20x ea   Doorway stretch 30"x3 30"x3  30"x3 30"x3 30"x3   Seated thoracic stretch Rot 10x10" ea;  ext 3x30" Rot. 10x10"; ext 3x30" Rot. 10x10" ext 3x30" rot 10x10" ext 3x30" rot 10x10" ext 3x30" Rot   10x10" ext 3x30"   thoracic extension elbows against wall 5"x20 5"x20 5"x20 5"x20 5"x20 5"x20   SL clamshells BTB  5"x20 BTB  5"x20 B TB 5"x20 blue  5"x20 black  5"x20 Black 5"x20   Prone ER ball sq                           Ther Activity

## 2023-08-03 ENCOUNTER — OFFICE VISIT (OUTPATIENT)
Dept: PHYSICAL THERAPY | Facility: CLINIC | Age: 63
End: 2023-08-03
Payer: MEDICARE

## 2023-08-03 DIAGNOSIS — M54.9 MID BACK PAIN: ICD-10-CM

## 2023-08-03 DIAGNOSIS — M54.14 THORACIC RADICULOPATHY: Primary | ICD-10-CM

## 2023-08-03 PROCEDURE — 97112 NEUROMUSCULAR REEDUCATION: CPT | Performed by: PHYSICAL THERAPIST

## 2023-08-03 PROCEDURE — 97110 THERAPEUTIC EXERCISES: CPT | Performed by: PHYSICAL THERAPIST

## 2023-08-03 NOTE — PROGRESS NOTES
Daily Note     Today's date: 8/3/2023  Patient name: Neto Yang  : 1960  MRN: [id number]  Referring provider: Javier Barros  Dx:   Encounter Diagnosis     ICD-10-CM    1. Thoracic radiculopathy  M54.14       2. Mid back pain  M54.9                      Subjective: Pt reports no new changes. Objective: See treatment diary below      Assessment: Pt has good tolerance to all activities. Pt to transition to independent program at this time. Plan: No further skilled PT required.       Precautions: multiple lumbar spine surgeries      Manuals 7/17 7/20 7/24 7/27 7/31 8/3                                            Neuro Re-Ed          Posture tband Seated on pball blue   x20 ea seated on swiss ball, blue TB 20x5" ea seated on pball  30 ea seated on pball  30 ea Seated on pball 30x ea Seated on pball 30x ea    Cane ext with scap sq 10x10" 15x10" 10"x10 10"x10 10x10" 10x10"              Palloff press Blue  x10ea GTB x10ea side blue  15 ea blue  15 ea Blue 15x ea Blue 20x ea    Tband rotation Blue  x10ea GTB x10x ea side blue  15 ea blue  15 ea Blue 15x ea Blue 15x ea                        Ther Ex          UBE (back) 8' 8' 8' 8' 8' 8'    LTR          PPT with ball sq 5"x20 5"x20 5"x20 5"x20 5"x20 5"x20    Bridging 5"x20 5"x20 5"x20 5"x20 5"x20 5"x20    Supine SLR x20 ea x20 ea 20 ea 20 ea 20x ea 20x ea    Doorway stretch 30"x3  30"x3 30"x3 30"x3 30"x3    Seated thoracic stretch Rot. 10x10"; ext 3x30" Rot. 10x10" ext 3x30" rot 10x10" ext 3x30" rot 10x10" ext 3x30" Rot   10x10" ext 3x30" Rot   10x10" ext 3x30"    thoracic extension elbows against wall 5"x20 5"x20 5"x20 5"x20 5"x20 5"x20    SL clamshells BTB  5"x20 B TB 5"x20 blue  5"x20 black  5"x20 Black 5"x20 Black 5"x20    Prone ER ball sq                              Ther Activity

## 2023-11-02 RX ORDER — AZELASTINE 1 MG/ML
SPRAY, METERED NASAL EVERY 12 HOURS
COMMUNITY
Start: 2023-10-09

## 2023-11-02 RX ORDER — CLARITHROMYCIN 500 MG/1
TABLET, COATED ORAL
COMMUNITY
Start: 2023-08-02

## 2023-11-02 RX ORDER — CODEINE PHOSPHATE AND GUAIFENESIN 10; 100 MG/5ML; MG/5ML
SOLUTION ORAL
COMMUNITY

## 2023-11-02 RX ORDER — BENZONATATE 100 MG/1
CAPSULE ORAL
COMMUNITY
Start: 2023-08-10

## 2023-11-02 RX ORDER — PREDNISONE 20 MG/1
TABLET ORAL
COMMUNITY
Start: 2023-10-24

## 2023-11-02 RX ORDER — FLUTICASONE PROPIONATE AND SALMETEROL 250; 50 UG/1; UG/1
1 POWDER RESPIRATORY (INHALATION) EVERY 12 HOURS
COMMUNITY
Start: 2023-08-09

## 2023-11-02 RX ORDER — BENZONATATE 200 MG/1
1 CAPSULE ORAL 3 TIMES DAILY
COMMUNITY
Start: 2023-10-24

## 2023-11-03 ENCOUNTER — CONSULT (OUTPATIENT)
Age: 63
End: 2023-11-03
Payer: MEDICARE

## 2023-11-03 VITALS
DIASTOLIC BLOOD PRESSURE: 60 MMHG | OXYGEN SATURATION: 96 % | BODY MASS INDEX: 21.82 KG/M2 | SYSTOLIC BLOOD PRESSURE: 124 MMHG | HEART RATE: 83 BPM | TEMPERATURE: 91.6 F | WEIGHT: 123.2 LBS

## 2023-11-03 DIAGNOSIS — R05.3 CHRONIC COUGH: ICD-10-CM

## 2023-11-03 DIAGNOSIS — J45.40 MODERATE PERSISTENT ASTHMA WITHOUT COMPLICATION: Primary | ICD-10-CM

## 2023-11-03 DIAGNOSIS — J30.9 ALLERGIC RHINITIS, UNSPECIFIED SEASONALITY, UNSPECIFIED TRIGGER: ICD-10-CM

## 2023-11-03 PROCEDURE — 99205 OFFICE O/P NEW HI 60 MIN: CPT | Performed by: INTERNAL MEDICINE

## 2023-11-03 RX ORDER — MONTELUKAST SODIUM 10 MG/1
10 TABLET ORAL
Qty: 30 TABLET | Refills: 1 | Status: SHIPPED | OUTPATIENT
Start: 2023-11-03

## 2023-11-03 RX ORDER — ALBUTEROL SULFATE 90 UG/1
2 AEROSOL, METERED RESPIRATORY (INHALATION) EVERY 6 HOURS PRN
Qty: 18 G | Refills: 1 | Status: SHIPPED | OUTPATIENT
Start: 2023-11-03

## 2023-11-03 NOTE — PATIENT INSTRUCTIONS
Asthma   WHAT YOU NEED TO KNOW:   Asthma is a lung disease that makes breathing difficult. Chronic inflammation and reactions to triggers narrow the airways in the lungs. Asthma can become life-threatening if it is not managed. DISCHARGE INSTRUCTIONS:   Call your local emergency number (911 in the [address]) if:   You have severe shortness of breath. The skin around your neck and ribs pulls in with each breath. Your peak flow numbers are in the red zone of your AAP. Return to the emergency department if:   You have shortness of breath, even after you take your short-term medicine as directed. Your lips or nails turn blue or gray. Call your doctor or asthma specialist if:   You run out of medicine before your next refill is due. Your symptoms get worse. You need to take more medicine than usual to control your symptoms. You have questions or concerns about your condition or care. Medicines:   Medicines  may be used to decrease inflammation, open airways, and make it easier to breathe. Medicines may be inhaled, taken as a pill, or injected. Short-term medicines relieve your symptoms quickly. Long-term medicines are used to prevent future asthma attacks. Other medicines may be needed if your regular medicines are not able to prevent attacks. You may also need medicine to help control your allergies. Ask your healthcare provider for more information about any medicine you are given and how to take it safely. Take your medicine as directed. Contact your healthcare provider if you think your medicine is not helping or if you have side effects. Tell your provider if you are allergic to any medicine. Keep a list of the medicines, vitamins, and herbs you take. Include the amounts, and when and why you take them. Bring the list or the pill bottles to follow-up visits. Carry your medicine list with you in case of an emergency. Manage your symptoms and prevent future attacks:    Follow your Asthma Action Plan (AAP). This is a written plan that you and your healthcare provider create. It explains which medicine you need and when to change doses if necessary. It also explains how you can monitor symptoms and use a peak flow meter. The meter measures how well your lungs are working. Manage other health conditions , such as allergies, acid reflux, and sleep apnea. Identify and avoid triggers. These may include pets, dust mites, mold, and cockroaches. Do not smoke or be around others who smoke. Nicotine and other chemicals in cigarettes and cigars can cause lung damage. Ask your healthcare provider for information if you currently smoke and need help to quit. E-cigarettes or smokeless tobacco still contain nicotine. Talk to your healthcare provider before you use these products. Ask about the flu vaccine. The flu can make your asthma worse. You may need a yearly flu shot. Follow up with your healthcare provider as directed: You will need to return to make sure your medicine is working and your symptoms are controlled. You may be referred to an asthma or allergy specialist. Diamond Hagen may be asked to keep a record of your peak flow values and bring it with you to your appointments. Write down your questions so you remember to ask them during your visits. © Copyright [employer]let[employer] Gosselin 2023 Information is for End User's use only and may not be sold, redistributed or otherwise used for commercial purposes. The above information is an  only. It is not intended as medical advice for individual conditions or treatments. Talk to your doctor, nurse or pharmacist before following any medical regimen to see if it is safe and effective for you.
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
## 2023-11-03 NOTE — ASSESSMENT & PLAN NOTE
Management as detailed above  Potentially reactive airway disease versus asthmatic bronchitis versus eosinophilic bronchitis
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
## 2023-11-07 ENCOUNTER — APPOINTMENT (OUTPATIENT)
Dept: LAB | Facility: HOSPITAL | Age: 63
End: 2023-11-07
Payer: MEDICARE

## 2023-11-07 ENCOUNTER — HOSPITAL ENCOUNTER (OUTPATIENT)
Dept: RADIOLOGY | Facility: HOSPITAL | Age: 63
Discharge: HOME | End: 2023-11-07
Payer: MEDICARE

## 2023-11-07 DIAGNOSIS — J45.40 MODERATE PERSISTENT ASTHMA WITHOUT COMPLICATION: ICD-10-CM

## 2023-11-07 DIAGNOSIS — R05.3 CHRONIC COUGH: ICD-10-CM

## 2023-11-07 PROCEDURE — 36415 COLL VENOUS BLD VENIPUNCTURE: CPT

## 2023-11-07 PROCEDURE — 86003 ALLG SPEC IGE CRUDE XTRC EA: CPT

## 2023-11-07 PROCEDURE — 82785 ASSAY OF IGE: CPT

## 2023-11-07 PROCEDURE — 71046 X-RAY EXAM CHEST 2 VIEWS: CPT

## 2023-11-10 ENCOUNTER — HOSPITAL ENCOUNTER (OUTPATIENT)
Dept: PULMONOLOGY | Facility: HOSPITAL | Age: 63
End: 2023-11-10
Attending: INTERNAL MEDICINE
Payer: MEDICARE

## 2023-11-10 DIAGNOSIS — J45.40 MODERATE PERSISTENT ASTHMA WITHOUT COMPLICATION: ICD-10-CM

## 2023-11-10 DIAGNOSIS — R05.3 CHRONIC COUGH: ICD-10-CM

## 2023-11-10 PROCEDURE — 94726 PLETHYSMOGRAPHY LUNG VOLUMES: CPT

## 2023-11-10 PROCEDURE — 94060 EVALUATION OF WHEEZING: CPT | Performed by: INTERNAL MEDICINE

## 2023-11-10 PROCEDURE — 94060 EVALUATION OF WHEEZING: CPT

## 2023-11-10 PROCEDURE — 94726 PLETHYSMOGRAPHY LUNG VOLUMES: CPT | Performed by: INTERNAL MEDICINE

## 2023-11-10 PROCEDURE — 94729 DIFFUSING CAPACITY: CPT | Performed by: INTERNAL MEDICINE

## 2023-11-10 PROCEDURE — 94729 DIFFUSING CAPACITY: CPT

## 2023-11-10 PROCEDURE — 94760 N-INVAS EAR/PLS OXIMETRY 1: CPT

## 2023-11-10 RX ORDER — ALBUTEROL SULFATE 2.5 MG/3ML
2.5 SOLUTION RESPIRATORY (INHALATION) ONCE
Status: COMPLETED | OUTPATIENT
Start: 2023-11-10 | End: 2023-11-10

## 2023-11-10 RX ADMIN — ALBUTEROL SULFATE 2.5 MG: 2.5 SOLUTION RESPIRATORY (INHALATION) at 10:12

## 2023-11-14 LAB

## 2023-11-25 DIAGNOSIS — R05.3 CHRONIC COUGH: ICD-10-CM

## 2023-11-25 DIAGNOSIS — J45.40 MODERATE PERSISTENT ASTHMA WITHOUT COMPLICATION: ICD-10-CM

## 2023-11-27 RX ORDER — MONTELUKAST SODIUM 10 MG/1
10 TABLET ORAL
Qty: 90 TABLET | Refills: 1 | Status: SHIPPED | OUTPATIENT
Start: 2023-11-27

## 2023-11-28 ENCOUNTER — EVALUATION (OUTPATIENT)
Dept: PHYSICAL THERAPY | Facility: CLINIC | Age: 63
End: 2023-11-28
Payer: MEDICARE

## 2023-11-28 DIAGNOSIS — M25.562 ACUTE PAIN OF LEFT KNEE: ICD-10-CM

## 2023-11-28 DIAGNOSIS — M76.32 IT BAND SYNDROME, LEFT: ICD-10-CM

## 2023-11-28 DIAGNOSIS — M70.42 PREPATELLAR BURSITIS OF LEFT KNEE: Primary | ICD-10-CM

## 2023-11-28 PROCEDURE — 97110 THERAPEUTIC EXERCISES: CPT | Performed by: PHYSICAL THERAPIST

## 2023-11-28 PROCEDURE — 97162 PT EVAL MOD COMPLEX 30 MIN: CPT | Performed by: PHYSICAL THERAPIST

## 2023-11-28 NOTE — LETTER
2023    Bethany Morris DO  [address] [place] [address]    Patient: Slava Ivan   YOB: 1960   Date of Visit: 2023     Encounter Diagnosis     ICD-10-CM    1. Prepatellar bursitis of left knee  M70.42       2. Acute pain of left knee  M25.562       3. It band syndrome, left  M76.32           Dear Dr. Ballesteros Arn:    Thank you for your recent referral of Slava Ivan. Please review the attached evaluation summary from Wilma's recent visit. Please verify that you agree with the plan of care by signing the attached order. If you have any questions or concerns, please do not hesitate to call. I sincerely appreciate the opportunity to share in the care of one of your patients and hope to have another opportunity to work with you in the near future. Sincerely,    Leila Biswas, PT      Referring Provider:      I certify that I have read the below Plan of Care and certify the need for these services furnished under this plan of treatment while under my care. Bethany Morris DO  [address] [place], [place] [id number]  Via Fax: [phone]          PT Evaluation     Today's date: 2023  Patient name: Slava Ivan  : 1960  MRN: [id number]  Referring provider: Bethany Morris DO  Dx:   Encounter Diagnosis     ICD-10-CM    1. Prepatellar bursitis of left knee  M70.42       2. Acute pain of left knee  M25.562       3. It band syndrome, left  M76.32                      Assessment  Assessment details: Slava Ivan is a 69 yo female with left anterolateral knee pain and pain along left IT band presenting with decreased LLE strength, decreased flexibility, and the listed functional limitations. She is a good candidate for OPPT to address the above impairments and optimize functional status. Of note, patient has a history of transverse myelitis and L4-5 discectomy.  She continues to have low back pain and complains of frequent cramping in her left hamstrings. Functional limitations: trips frequently ascending stairs  Goals  6 weeks  1. Optimize hip and knee strength of LLE to improve body mechanics. 2. Normalize flexibility of LLE to improve body mechanics. 3. Tolerate negotiating stairs without pain or difficulty to allow resuming PLOF. 4. Independent with HEP at discharge. Plan  Plan details: Provided with and reviewed initial written HEP. Reviewed physical exam findings and plan of care. All questions answered to patient's satisfaction. Patient would benefit from: PT eval and skilled physical therapy  Planned modality interventions: low level laser therapy  Planned therapy interventions: manual therapy, neuromuscular re-education, patient education, therapeutic activities, therapeutic exercise and home exercise program  Frequency: 2x week  Duration in weeks: 6  Treatment plan discussed with: patient        Subjective Evaluation    History of Present Illness  Mechanism of injury: Patient has been having left anterolateral knee pain since May of this year when she noticed a "cyst". She irritated it while walking up the stairs appx 3 weeks ago with her knee giving out and causing significant pain. She saw ortho and was referred to OPPT. She is no longer having significant knee pain but would like to strengthen her leg.      PMH significant for transverse myelitis, L4-5 discectomy   Patient Goals  Patient goals for therapy: increased strength    Pain  Current pain ratin  At best pain ratin  At worst pain rating: 10  Location: L ant knee    Exercise history: walking    Treatments  No previous or current treatments        Objective     Strength/Myotome Testing     Left Hip   Planes of Motion   Flexion: 4  Extension: 4  Abduction: 4-  Adduction: 4-  External rotation: 4  Internal rotation: 4    Right Hip   Planes of Motion   Flexion: 5  Extension: 5  Abduction: 4  Adduction: 4  External rotation: 4  Internal rotation: 5    Left Knee   Flexion: 4  Prone flexion: 4  Extension: 4    Right Knee   Flexion: 5  Extension: 5    Left Ankle/Foot   Dorsiflexion: 3-    Right Ankle/Foot   Dorsiflexion: 5    Tests     Left Hip   Positive Jan. HEP provided as follows:  Access Code: [email or web address]  URL: [email or web address]  Date: 11/28/2023  Prepared by: Velma Lerner    Exercises  - Supine Transversus Abdominis Bracing - Hands on Stomach  - 1 x daily - 7 x weekly - 3 sets - 10 reps  - Clamshell with Resistance  - 1 x daily - 7 x weekly - 3 sets - 10 reps  - Sidelying Hip Abduction  - 1 x daily - 7 x weekly - 3 sets - 10 reps  - Supine Active Straight Leg Raise  - 1 x daily - 7 x weekly - 3 sets - 10 reps  - Prone Hip Extension  - 1 x daily - 7 x weekly - 3 sets - 10 reps  - Sidelying Hip Adduction  - 1 x daily - 7 x weekly - 3 sets - 10 reps  - Supine ITB Stretch with Strap  - 1 x daily - 7 x weekly - 3 reps - 30 hold         Precautions: LLE weakness      Manuals 11/28            ITB tiger tail KT                                                   Neuro Re-Ed                                                                                                        Ther Ex             TA + SLRx4 mat             Clam             ITB str w/ strap             Bridge w/ ball sq             TR                                       Pt ed HEP rev KT            Ther Activity             Bike warm up             Squat over mat             Step up F/L/C                          Gait Training                                       Modalities

## 2023-11-28 NOTE — PROGRESS NOTES
PT Evaluation     Today's date: 2023  Patient name: Mark Betancourt  : 1960  MRN: [id number]  Referring provider: Onel Moore DO  Dx:   Encounter Diagnosis     ICD-10-CM    1. Prepatellar bursitis of left knee  M70.42       2. Acute pain of left knee  M25.562       3. It band syndrome, left  M76.32                      Assessment  Assessment details: Mark Betancourt is a 69 yo female with left anterolateral knee pain and pain along left IT band presenting with decreased LLE strength, decreased flexibility, and the listed functional limitations. She is a good candidate for OPPT to address the above impairments and optimize functional status. Of note, patient has a history of transverse myelitis and L4-5 discectomy. She continues to have low back pain and complains of frequent cramping in her left hamstrings. Functional limitations: trips frequently ascending stairs  Goals  6 weeks  1. Optimize hip and knee strength of LLE to improve body mechanics. 2. Normalize flexibility of LLE to improve body mechanics. 3. Tolerate negotiating stairs without pain or difficulty to allow resuming PLOF. 4. Independent with HEP at discharge. Plan  Plan details: Provided with and reviewed initial written HEP. Reviewed physical exam findings and plan of care. All questions answered to patient's satisfaction. Patient would benefit from: PT eval and skilled physical therapy  Planned modality interventions: low level laser therapy  Planned therapy interventions: manual therapy, neuromuscular re-education, patient education, therapeutic activities, therapeutic exercise and home exercise program  Frequency: 2x week  Duration in weeks: 6  Treatment plan discussed with: patient        Subjective Evaluation    History of Present Illness  Mechanism of injury: Patient has been having left anterolateral knee pain since May of this year when she noticed a "cyst".  She irritated it while walking up the stairs appx 3 weeks ago with her knee giving out and causing significant pain. She saw ortho and was referred to OPPT. She is no longer having significant knee pain but would like to strengthen her leg. PMH significant for transverse myelitis, L4-5 discectomy   Patient Goals  Patient goals for therapy: increased strength    Pain  Current pain ratin  At best pain ratin  At worst pain rating: 10  Location: L ant knee    Exercise history: walking    Treatments  No previous or current treatments        Objective     Strength/Myotome Testing     Left Hip   Planes of Motion   Flexion: 4  Extension: 4  Abduction: 4-  Adduction: 4-  External rotation: 4  Internal rotation: 4    Right Hip   Planes of Motion   Flexion: 5  Extension: 5  Abduction: 4  Adduction: 4  External rotation: 4  Internal rotation: 5    Left Knee   Flexion: 4  Prone flexion: 4  Extension: 4    Right Knee   Flexion: 5  Extension: 5    Left Ankle/Foot   Dorsiflexion: 3-    Right Ankle/Foot   Dorsiflexion: 5    Tests     Left Hip   Positive Jan. HEP provided as follows:  Access Code: [email or web address]  URL: [email or web address]  Date: 2023  Prepared by: Etelvina Lerner    Exercises  - Supine Transversus Abdominis Bracing - Hands on Stomach  - 1 x daily - 7 x weekly - 3 sets - 10 reps  - Clamshell with Resistance  - 1 x daily - 7 x weekly - 3 sets - 10 reps  - Sidelying Hip Abduction  - 1 x daily - 7 x weekly - 3 sets - 10 reps  - Supine Active Straight Leg Raise  - 1 x daily - 7 x weekly - 3 sets - 10 reps  - Prone Hip Extension  - 1 x daily - 7 x weekly - 3 sets - 10 reps  - Sidelying Hip Adduction  - 1 x daily - 7 x weekly - 3 sets - 10 reps  - Supine ITB Stretch with Strap  - 1 x daily - 7 x weekly - 3 reps - 30 hold         Precautions: LLE weakness      Manuals             ITB tiger tail KT                                                   Neuro Re-Ed Ther Ex             TA + SLRx4 mat             Clam             ITB str w/ strap             Bridge w/ ball sq             TR                                       Pt ed HEP rev KT            Ther Activity             Bike warm up             Squat over mat             Step up F/L/C                          Gait Training                                       Modalities

## 2023-12-01 ENCOUNTER — OFFICE VISIT (OUTPATIENT)
Dept: PHYSICAL THERAPY | Facility: CLINIC | Age: 63
End: 2023-12-01
Payer: MEDICARE

## 2023-12-01 DIAGNOSIS — M70.42 PREPATELLAR BURSITIS OF LEFT KNEE: Primary | ICD-10-CM

## 2023-12-01 DIAGNOSIS — M76.32 IT BAND SYNDROME, LEFT: ICD-10-CM

## 2023-12-01 DIAGNOSIS — M25.562 ACUTE PAIN OF LEFT KNEE: ICD-10-CM

## 2023-12-01 PROCEDURE — 97140 MANUAL THERAPY 1/> REGIONS: CPT

## 2023-12-01 PROCEDURE — 97110 THERAPEUTIC EXERCISES: CPT

## 2023-12-01 NOTE — PROGRESS NOTES
Daily Note     Today's date: 2023  Patient name: Nat Dong  : 1960  MRN: [id number]  Referring provider: Roxann Kruse DO  Dx:   Encounter Diagnosis     ICD-10-CM    1. Prepatellar bursitis of left knee  M70.42       2. Acute pain of left knee  M25.562       3. It band syndrome, left  M76.32           Start Time: 1115  Stop Time: 1200  Total time in clinic (min): 45 minutes    Subjective: Patient states that she feels more weakness in the LLE than the RLE. Objective: See treatment diary below      Assessment: Good tolerance to program this visit. Most challenge with SL adduction this visit. Manuals done with large foam roll 2* to self reported bruising on lateral thigh. Plan: Continue per plan of care.   Update HEP with SLR 4-way NV     Precautions: LLE weakness      Manuals            ITB tiger tail KT LQ larger roller reported bruising at hip                                                  Neuro Re-Ed                                                                                                        Ther Ex             TA + SLRx4 mat  3x5b/l ea   flex, ab/add Add prone hip extension          Clam             ITB str w/ strap  30"x3           Bridge w/ ball sq   NV          TR  2x10                                     Pt ed HEP rev KT            Ther Activity             Bike warm up  8 min           Squat over mat             Step up F/L/C                          Gait Training                                       Modalities

## 2023-12-06 ENCOUNTER — OFFICE VISIT (OUTPATIENT)
Dept: PHYSICAL THERAPY | Facility: CLINIC | Age: 63
End: 2023-12-06
Payer: MEDICARE

## 2023-12-06 DIAGNOSIS — M25.562 ACUTE PAIN OF LEFT KNEE: ICD-10-CM

## 2023-12-06 DIAGNOSIS — M70.42 PREPATELLAR BURSITIS OF LEFT KNEE: Primary | ICD-10-CM

## 2023-12-06 PROCEDURE — 97112 NEUROMUSCULAR REEDUCATION: CPT | Performed by: PHYSICAL THERAPIST

## 2023-12-06 PROCEDURE — 97140 MANUAL THERAPY 1/> REGIONS: CPT | Performed by: PHYSICAL THERAPIST

## 2023-12-06 PROCEDURE — 97110 THERAPEUTIC EXERCISES: CPT | Performed by: PHYSICAL THERAPIST

## 2023-12-06 NOTE — PROGRESS NOTES
Daily Note     Today's date: 2023  Patient name: Huan Santiago  : 1960  MRN: [id number]  Referring provider: Amari Centeno DO  Dx:   Encounter Diagnosis     ICD-10-CM    1. Prepatellar bursitis of left knee  M70.42       2. Acute pain of left knee  M25.562                      Subjective: Patient denies pain upon arrival to PT but that her leg continues to feel weak. Objective: See treatment diary below    Access Code: [email or web address]  URL: [email or web address]  Date: 2023  Prepared by: Hanane Guaman    Exercises  - Clamshell with Resistance  - 1 x daily - 7 x weekly - 30 reps - 5 seconds hold  - Sidelying Hip Abduction  - 1 x daily - 7 x weekly - 20 reps  - Prone Hip Extension - Two Pillows  - 1 x daily - 7 x weekly - 20 reps  - Sidelying Hip Adduction  - 1 x daily - 7 x weekly - 20 reps  - Supine Active Straight Leg Raise  - 1 x daily - 7 x weekly - 20 reps  - Supine Bridge with Mini Swiss Ball Between Knees  - 1 x daily - 7 x weekly - 20 reps - 5 seconds hold  - Standing Toe Taps  - 1 x daily - 7 x weekly - 20 reps      Assessment: slight discomfort reports with manual tx but otherwise has good tolerance. Progressed therex as listed. Pt requires occasional verbal cues for proper TaA during therex and to avoid circumduction pattern on cone taps. She reports fatigue with therex. Improved tolerance to prone hip extension with pillow under hips. Provided pt with green tb and updated hep. Plan: Continue per plan of care.        Precautions: LLE weakness      Manuals           ITB tiger tail KT LQ larger roller reported bruising at hip kl                                                 Neuro Re-Ed                                                                                                        Ther Ex             TA + SLRx4 mat  3x5b/l ea   flex, ab/add 20ea          Clam   Green tb 5"x20          ITB str w/ strap  30"x3 30"x3          Bridge w/ ball sq 5"x20          TR  2x10 x20          Cone tap   X20 orange cones                       Pt ed HEP rev KT            Ther Activity             Bike warm up  8 min 8' lvl 3          Squat over mat             Step up F/L/C                          Gait Training                                       Modalities

## 2023-12-08 ENCOUNTER — OFFICE VISIT (OUTPATIENT)
Dept: PHYSICAL THERAPY | Facility: CLINIC | Age: 63
End: 2023-12-08
Payer: MEDICARE

## 2023-12-08 DIAGNOSIS — M25.562 ACUTE PAIN OF LEFT KNEE: ICD-10-CM

## 2023-12-08 DIAGNOSIS — M70.42 PREPATELLAR BURSITIS OF LEFT KNEE: Primary | ICD-10-CM

## 2023-12-08 DIAGNOSIS — M76.32 IT BAND SYNDROME, LEFT: ICD-10-CM

## 2023-12-08 PROCEDURE — 97530 THERAPEUTIC ACTIVITIES: CPT | Performed by: PHYSICAL THERAPIST

## 2023-12-08 PROCEDURE — 97110 THERAPEUTIC EXERCISES: CPT | Performed by: PHYSICAL THERAPIST

## 2023-12-08 NOTE — PROGRESS NOTES
Daily Note     Today's date: 2023  Patient name: Torrey Cameron  : 1960  MRN: [id number]  Referring provider: Sofía Esquivel DO  Dx:   Encounter Diagnosis     ICD-10-CM    1. Prepatellar bursitis of left knee  M70.42       2. Acute pain of left knee  M25.562       3. It band syndrome, left  M76.32                      Subjective: Pt reports she was noticing some left buttock pain with her exercises. Not sure which exercise in particular. Objective: See treatment diary below      Assessment: Tolerated treatment well. Patient reports recreation of left buttock pain with ITB stretch; instructed patient to hold for a few days to see if buttock pain improves. No complaint with addition of functional strengthening. Plan: Continue per plan of care.       Precautions: LLE weakness      Manuals          ITB tiger tail KT LQ larger roller reported bruising at hip kl KT                                                Neuro Re-Ed                                                                                                        Ther Ex             TA + SLRx4 mat  3x5b/l ea   flex, ab/add 20ea 3x10 ea B/L         Clam   Green tb 5"x20 Grn 5"x30         ITB str w/ strap  30"x3 30"x3 hold         Bridge w/ ball sq   5"x20 5"x20         TR  2x10 x20 x20         Cone tap   X20 orange cones                       Pt ed HEP rev KT            Ther Activity             Bike warm up  8 min 8' lvl 3 8' lv 4         Squat over mat    2x10         Step up F/L/C    6" 10x ea                      Gait Training                                       Modalities

## 2023-12-12 ENCOUNTER — OFFICE VISIT (OUTPATIENT)
Dept: PHYSICAL THERAPY | Facility: CLINIC | Age: 63
End: 2023-12-12
Payer: MEDICARE

## 2023-12-12 DIAGNOSIS — M70.42 PREPATELLAR BURSITIS OF LEFT KNEE: Primary | ICD-10-CM

## 2023-12-12 DIAGNOSIS — M25.562 ACUTE PAIN OF LEFT KNEE: ICD-10-CM

## 2023-12-12 DIAGNOSIS — M76.32 IT BAND SYNDROME, LEFT: ICD-10-CM

## 2023-12-12 PROCEDURE — 97110 THERAPEUTIC EXERCISES: CPT | Performed by: PHYSICAL THERAPIST

## 2023-12-12 PROCEDURE — 97530 THERAPEUTIC ACTIVITIES: CPT | Performed by: PHYSICAL THERAPIST

## 2023-12-12 NOTE — PROGRESS NOTES
Daily Note     Today's date: 2023  Patient name: Torrey Cameron  : 1960  MRN: [id number]  Referring provider: Sofía Esquivel DO  Dx:   Encounter Diagnosis     ICD-10-CM    1. Prepatellar bursitis of left knee  M70.42       2. Acute pain of left knee  M25.562       3. It band syndrome, left  M76.32                      Subjective: Pt reports prepatellar swelling after Friday. Isn't sure if it's from progression in PT, wearing heels at a wedding, or not being as consistent with voltaren cream.      Objective: See treatment diary below      Assessment: Tolerated treatment well. Held squats and step ups due to increased swelling after last visit. Plan: Continue per plan of care.       Precautions: LLE weakness      Manuals         ITB tiger tail KT LQ larger roller reported bruising at hip kl KT KT                                               Neuro Re-Ed                                                                                                        Ther Ex             TA + SLRx4 mat  3x5b/l ea   flex, ab/add 20ea 3x10 ea B/L 3x10 ea B/L        Clam   Green tb 5"x20 Grn 5"x30         ITB str w/ strap  30"x3 30"x3 hold         Bridge w/ ball sq   5"x20 5"x20 5"x30        TR  2x10 x20 x20         Cone tap   X20 orange cones                       Pt ed HEP rev KT            Ther Activity             Bike warm up  8 min 8' lvl 3 8' lv 4 10'        Squat over mat    2x10 hold        Step up F/L/C    6" 10x ea hold                     Gait Training                                       Modalities

## 2023-12-14 ENCOUNTER — OFFICE VISIT (OUTPATIENT)
Dept: PHYSICAL THERAPY | Facility: CLINIC | Age: 63
End: 2023-12-14
Payer: MEDICARE

## 2023-12-14 DIAGNOSIS — M70.42 PREPATELLAR BURSITIS OF LEFT KNEE: Primary | ICD-10-CM

## 2023-12-14 DIAGNOSIS — M76.32 IT BAND SYNDROME, LEFT: ICD-10-CM

## 2023-12-14 DIAGNOSIS — M25.562 ACUTE PAIN OF LEFT KNEE: ICD-10-CM

## 2023-12-14 PROCEDURE — 97530 THERAPEUTIC ACTIVITIES: CPT | Performed by: PHYSICAL THERAPIST

## 2023-12-14 PROCEDURE — 97110 THERAPEUTIC EXERCISES: CPT | Performed by: PHYSICAL THERAPIST

## 2023-12-14 NOTE — PROGRESS NOTES
Daily Note     Today's date: 2023  Patient name: Sharonda Lilly  : 1960  MRN: [id number]  Referring provider: Linda Bryson DO  Dx:   Encounter Diagnosis     ICD-10-CM    1. Prepatellar bursitis of left knee  M70.42       2. Acute pain of left knee  M25.562       3. It band syndrome, left  M76.32                      Subjective: Pt reports she's still having some swelling but her knee isn't really bothering her. She feels her left leg is still weak. She had a lateral calf spasm the night after last PT visit. Objective: See treatment diary below      Assessment: Tolerated treatment well. Patient exhibited good technique with therapeutic exercises and would benefit from continued PT. No complaints with addition of ankle weight. Plan: Continue per plan of care.       Precautions: LLE weakness      Manuals        ITB tiger tail KT LQ larger roller reported bruising at hip kl KT KT KT                                              Neuro Re-Ed                                                                                                        Ther Ex             TA + SLRx4 mat  3x5b/l ea   flex, ab/add 20ea 3x10 ea B/L 3x10 ea B/L 3x10 ea B/L 1#       Clam   Green tb 5"x20 Grn 5"x30  Grn 5"x30       ITB str w/ strap  30"x3 30"x3 hold         Bridge w/ ball sq   5"x20 5"x20 5"x30 5"x30       TR  2x10 x20 x20  x30       Cone tap   X20 orange cones                       Pt ed HEP rev KT            Ther Activity             Bike warm up  8 min 8' lvl 3 8' lv 4 10' 10'       Squat over mat    2x10 hold        Step up F/L/C    6" 10x ea hold                     Gait Training                                       Modalities

## 2023-12-18 ENCOUNTER — OFFICE VISIT (OUTPATIENT)
Dept: PHYSICAL THERAPY | Facility: CLINIC | Age: 63
End: 2023-12-18
Payer: MEDICARE

## 2023-12-18 DIAGNOSIS — M25.562 ACUTE PAIN OF LEFT KNEE: ICD-10-CM

## 2023-12-18 DIAGNOSIS — M70.42 PREPATELLAR BURSITIS OF LEFT KNEE: Primary | ICD-10-CM

## 2023-12-18 DIAGNOSIS — M76.32 IT BAND SYNDROME, LEFT: ICD-10-CM

## 2023-12-18 PROCEDURE — 97530 THERAPEUTIC ACTIVITIES: CPT

## 2023-12-18 PROCEDURE — 97110 THERAPEUTIC EXERCISES: CPT

## 2023-12-18 NOTE — PROGRESS NOTES
"Daily Note     Today's date: 2023  Patient name: Wilma Berger  : 1960  MRN: [id number]  Referring provider: Bela Marin DO  Dx:   Encounter Diagnosis     ICD-10-CM    1. Prepatellar bursitis of left knee  M70.42       2. Acute pain of left knee  M25.562       3. It band syndrome, left  M76.32                      Subjective: Pt reports discomfort at times in her L buttock, especially when driving.    Objective: See treatment diary below    Assessment: Pt experienced some dizziness during tranfers supine to S/L, to prone, and reverse of same. Performed exercise program w/o complaint. Responded well to tiger tail STM to L hip/buttock. Tolerated treatment well. Will monitor.    Plan: Continue per plan of care.      Precautions: LLE weakness      Manuals       ITB tiger tail KT LQ larger roller reported bruising at hip kl KT KT KT MO                                             Neuro Re-Ed                                                                                                        Ther Ex             TA + SLRx4 mat  3x5b/l ea   flex, ab/add 20ea 3x10 ea B/L 3x10 ea B/L 3x10 ea B/L 1# 3x10  Ea  B/L 1#      Clam   Green tb 5\"x20 Grn 5\"x30  Grn 5\"x30 Grn  5\"x30      ITB str w/ strap  30\"x3 30\"x3 hold         Bridge w/ ball sq   5\"x20 5\"x20 5\"x30 5\"x30 5\"x30      TR  2x10 x20 x20  x30 30      Cone tap   X20 orange cones                       Pt ed HEP rev KT            Ther Activity             Bike warm up  8 min 8' lvl 3 8' lv 4 10' 10' 10'      Squat over mat    2x10 hold        Step up F/L/C    6\" 10x ea hold                     Gait Training                                       Modalities                                                    "

## 2023-12-27 ENCOUNTER — OFFICE VISIT (OUTPATIENT)
Dept: PHYSICAL THERAPY | Facility: CLINIC | Age: 63
End: 2023-12-27
Payer: MEDICARE

## 2023-12-27 DIAGNOSIS — M25.562 ACUTE PAIN OF LEFT KNEE: ICD-10-CM

## 2023-12-27 DIAGNOSIS — M70.42 PREPATELLAR BURSITIS OF LEFT KNEE: Primary | ICD-10-CM

## 2023-12-27 DIAGNOSIS — M76.32 IT BAND SYNDROME, LEFT: ICD-10-CM

## 2023-12-27 PROCEDURE — 97530 THERAPEUTIC ACTIVITIES: CPT

## 2023-12-27 PROCEDURE — 97110 THERAPEUTIC EXERCISES: CPT

## 2023-12-27 NOTE — PROGRESS NOTES
"Daily Note     Today's date: 2023  Patient name: Wilma Berger  : 1960  MRN: [id number]  Referring provider: Bela Marin DO  Dx:   Encounter Diagnosis     ICD-10-CM    1. Prepatellar bursitis of left knee  M70.42       2. Acute pain of left knee  M25.562       3. It band syndrome, left  M76.32           Start Time: 09  Stop Time: 1018  Total time in clinic (min): 48 minutes    Subjective: Pt denies any pain at L knee upon arrival to physical therapy today, just weakness.  Notes she saw her doctor yesterday who states she can reduce physical therapy to once a week and to avoid any squatting or kneeling.        Objective: See treatment diary below      Assessment: Tolerated treatment well. Continued with program as outlined below.  Visible signs of fatigue with assigned exercise.  Slight cramping/spasm in L gastroc during bridges, but resolved with short rest break.  Slight increased tone/soft tissue restriction present along mid ITB today.  Patient would benefit from continued PT to further improve strength, increase muscular endurance, and maximize function with ADL's.        Plan: Continue per plan of care.      Precautions: LLE weakness      Manuals      ITB tiger tail KT LQ larger roller reported bruising at hip kl KT KT KT [employer]                                            Neuro Re-Ed                                                                                                        Ther Ex             TA + SLRx4 mat  3x5b/l ea   flex, ab/add 20ea 3x10 ea B/L 3x10 ea B/L 3x10 ea B/L 1# 3x10  Ea  B/L 1# 3x10  Ea  B/L 1#     Clam   Green tb 5\"x20 Grn 5\"x30  Grn 5\"x30 Grn  5\"x30 Grn  5\"x30     ITB str w/ strap  30\"x3 30\"x3 hold         Bridge w/ ball sq   5\"x20 5\"x20 5\"x30 5\"x30 5\"x30 5\"x30     TR  2x10 x20 x20  x30 30 x30     Cone tap   X20 orange cones                       Pt ed HEP rev KT            Ther Activity             Bike warm up  8 min " "8' lvl 3 8' lv 4 10' 10' 10' 10'     Squat over mat    2x10 hold        Step up F/L/C    6\" 10x ea hold                     Gait Training                                       Modalities                                                      "

## 2024-01-02 ENCOUNTER — OFFICE VISIT (OUTPATIENT)
Dept: PHYSICAL THERAPY | Facility: CLINIC | Age: 64
End: 2024-01-02
Payer: MEDICARE

## 2024-01-02 DIAGNOSIS — M70.42 PREPATELLAR BURSITIS OF LEFT KNEE: Primary | ICD-10-CM

## 2024-01-02 DIAGNOSIS — M25.562 ACUTE PAIN OF LEFT KNEE: ICD-10-CM

## 2024-01-02 DIAGNOSIS — M76.32 IT BAND SYNDROME, LEFT: ICD-10-CM

## 2024-01-02 PROCEDURE — 97530 THERAPEUTIC ACTIVITIES: CPT | Performed by: PHYSICAL THERAPIST

## 2024-01-02 PROCEDURE — 97110 THERAPEUTIC EXERCISES: CPT | Performed by: PHYSICAL THERAPIST

## 2024-01-02 NOTE — PROGRESS NOTES
"Daily Note     Today's date: 2024  Patient name: Wilma Berger  : 1960  MRN: [id number]  Referring provider: Bela Marin DO  Dx:   Encounter Diagnosis     ICD-10-CM    1. Prepatellar bursitis of left knee  M70.42       2. Acute pain of left knee  M25.562       3. It band syndrome, left  M76.32                      Subjective: Pt reports the swelling on her knee is a little better.       Objective: See treatment diary below      Assessment: Tolerated treatment well. Patient exhibited good technique with therapeutic exercises and would benefit from continued PT. No complaints with adding back in step ups and sit to stands.       Plan: Continue per plan of care.      Precautions: LLE weakness      Manuals     ITB tiger tail KT LQ larger roller reported bruising at hip kl KT KT KT [employer] KT                                           Neuro Re-Ed                                                                                                        Ther Ex             TA + SLRx4 mat  3x5b/l ea   flex, ab/add 20ea 3x10 ea B/L 3x10 ea B/L 3x10 ea B/L 1# 3x10  Ea  B/L 1# 3x10  Ea  B/L 1# 3x10 ea B/L 1.5#    Clam   Green tb 5\"x20 Grn 5\"x30  Grn 5\"x30 Grn  5\"x30 Grn  5\"x30 5\"x30 grn    ITB str w/ strap  30\"x3 30\"x3 hold         Bridge w/ ball sq   5\"x20 5\"x20 5\"x30 5\"x30 5\"x30 5\"x30 5\"x30    TR  2x10 x20 x20  x30 30 x30 x30    Cone tap   X20 orange cones                       Pt ed HEP rev KT            Ther Activity             Bike warm up  8 min 8' lvl 3 8' lv 4 10' 10' 10' 10' 10'    Squat over mat    2x10 hold    10x    Step up F/L/C    6\" 10x ea hold    6\" 10x ea                 Gait Training                                       Modalities                                                        "

## 2024-01-04 ENCOUNTER — OFFICE VISIT (OUTPATIENT)
Dept: PHYSICAL THERAPY | Facility: CLINIC | Age: 64
End: 2024-01-04
Payer: MEDICARE

## 2024-01-04 DIAGNOSIS — M25.562 ACUTE PAIN OF LEFT KNEE: ICD-10-CM

## 2024-01-04 DIAGNOSIS — M76.32 IT BAND SYNDROME, LEFT: ICD-10-CM

## 2024-01-04 DIAGNOSIS — M70.42 PREPATELLAR BURSITIS OF LEFT KNEE: Primary | ICD-10-CM

## 2024-01-04 PROCEDURE — 97530 THERAPEUTIC ACTIVITIES: CPT | Performed by: PHYSICAL THERAPIST

## 2024-01-04 PROCEDURE — 97110 THERAPEUTIC EXERCISES: CPT | Performed by: PHYSICAL THERAPIST

## 2024-01-04 NOTE — PROGRESS NOTES
"Daily Note     Today's date: 2024  Patient name: Wilma Berger  : 1960  MRN: [id number]  Referring provider: Bela Marin DO  Dx:   Encounter Diagnosis     ICD-10-CM    1. Prepatellar bursitis of left knee  M70.42       2. It band syndrome, left  M76.32       3. Acute pain of left knee  M25.562                      Subjective: Pt reports no increased soreness or swelling after last visit. Notes she's not stumbling up the stairs as often.      Objective: See treatment diary below      Assessment: Tolerated treatment well. Patient exhibited good technique with therapeutic exercises and would benefit from continued PT. Fatigued with increased weight/resistance today.      Plan: Continue per plan of care.      Precautions: LLE weakness      Manuals    ITB tiger tail KT LQ larger roller reported bruising at hip kl KT KT KT [employer] KT KT                                          Neuro Re-Ed                                                                                                        Ther Ex             TA + SLRx4 mat  3x5b/l ea   flex, ab/add 20ea 3x10 ea B/L 3x10 ea B/L 3x10 ea B/L 1# 3x10  Ea  B/L 1# 3x10  Ea  B/L 1# 3x10 ea B/L 1.5# 3x10 ea B/L 2#   Clam   Green tb 5\"x20 Grn 5\"x30  Grn 5\"x30 Grn  5\"x30 Grn  5\"x30 5\"x30 grn 5\"x30 blue   ITB str w/ strap  30\"x3 30\"x3 hold         Bridge w/ ball sq   5\"x20 5\"x20 5\"x30 5\"x30 5\"x30 5\"x30 5\"x30 5\"x30   TR  2x10 x20 x20  x30 30 x30 x30 x30   Cone tap   X20 orange cones                       Pt ed HEP rev KT            Ther Activity             Bike warm up  8 min 8' lvl 3 8' lv 4 10' 10' 10' 10' 10' 10'   Squat over mat    2x10 hold    10x 20x   Step up F/L/C    6\" 10x ea hold    6\" 10x ea 6\" 20x ea                Gait Training                                       Modalities                                                          "

## 2024-01-09 ENCOUNTER — OFFICE VISIT (OUTPATIENT)
Dept: PHYSICAL THERAPY | Facility: CLINIC | Age: 64
End: 2024-01-09
Payer: MEDICARE

## 2024-01-09 DIAGNOSIS — M25.562 ACUTE PAIN OF LEFT KNEE: ICD-10-CM

## 2024-01-09 DIAGNOSIS — M70.42 PREPATELLAR BURSITIS OF LEFT KNEE: Primary | ICD-10-CM

## 2024-01-09 DIAGNOSIS — M76.32 IT BAND SYNDROME, LEFT: ICD-10-CM

## 2024-01-09 PROCEDURE — 97110 THERAPEUTIC EXERCISES: CPT

## 2024-01-09 PROCEDURE — 97140 MANUAL THERAPY 1/> REGIONS: CPT

## 2024-01-09 PROCEDURE — 97530 THERAPEUTIC ACTIVITIES: CPT

## 2024-01-09 NOTE — PROGRESS NOTES
"Daily Note     Today's date: 2024  Patient name: Wilma Berger  : 1960  MRN: [id number]  Referring provider: Bela Marin DO  Dx:   Encounter Diagnosis     ICD-10-CM    1. Prepatellar bursitis of left knee  M70.42       2. It band syndrome, left  M76.32       3. Acute pain of left knee  M25.562                      Subjective: Pt reports she tripped up 2 steps on Friday.     Objective: See treatment diary below    Assessment: Performed exercise program w/o difficulty or discomfort. Responded well to tiger tail STM. Tolerated treatment well. Will monitor.    Plan: Continue per plan of care.      Precautions: LLE weakness      Manuals    ITB tiger tail KT  kl KT KT KT [employer] KT KT                                          Neuro Re-Ed                                                                                                        Ther Ex             TA + SLRx4 mat 3x10 ea B/L 2#  20ea 3x10 ea B/L 3x10 ea B/L 3x10 ea B/L 1# 3x10  Ea  B/L 1# 3x10  Ea  B/L 1# 3x10 ea B/L 1.5# 3x10 ea B/L 2#   Clam Blue 5\"x30   Green tb 5\"x20 Grn 5\"x30  Grn 5\"x30 Grn  5\"x30 Grn  5\"x30 5\"x30 grn 5\"x30 blue   ITB str w/ strap   30\"x3 hold         Bridge w/ ball sq 5\"x30  5\"x20 5\"x20 5\"x30 5\"x30 5\"x30 5\"x30 5\"x30 5\"x30   TR x30  x20 x20  x30 30 x30 x30 x30   Cone tap   X20 orange cones                       Pt ed             Ther Activity             Bike warm up 10'  8' lvl 3 8' lv 4 10' 10' 10' 10' 10' 10'   Squat over mat STS  20x   2x10 hold    10x 20x   Step up F/L/C 6\"x20 ea   6\" 10x ea hold    6\" 10x ea 6\" 20x ea                Gait Training                                       Modalities                                                            "

## 2024-01-11 ENCOUNTER — EVALUATION (OUTPATIENT)
Dept: PHYSICAL THERAPY | Facility: CLINIC | Age: 64
End: 2024-01-11
Payer: MEDICARE

## 2024-01-11 DIAGNOSIS — M25.562 ACUTE PAIN OF LEFT KNEE: ICD-10-CM

## 2024-01-11 DIAGNOSIS — M70.42 PREPATELLAR BURSITIS OF LEFT KNEE: Primary | ICD-10-CM

## 2024-01-11 DIAGNOSIS — M76.32 IT BAND SYNDROME, LEFT: ICD-10-CM

## 2024-01-11 PROCEDURE — 97530 THERAPEUTIC ACTIVITIES: CPT | Performed by: PHYSICAL THERAPIST

## 2024-01-11 PROCEDURE — 97110 THERAPEUTIC EXERCISES: CPT | Performed by: PHYSICAL THERAPIST

## 2024-01-11 NOTE — PROGRESS NOTES
Reassessment    Today's date: 2024  Patient name: Wilma Berger  : 1960  MRN: [id number]  Referring provider: Bela Marin DO  Dx:   Encounter Diagnosis     ICD-10-CM    1. Prepatellar bursitis of left knee  M70.42       2. It band syndrome, left  M76.32       3. Acute pain of left knee  M25.562                        Assessment  Assessment details: Wilma Berger is a 64 yo female with left anterolateral knee pain and pain along left IT band presenting with excellent gains in strength and flexibility since beginning OPPT. She is independent with her HEP and appropriate for discharge at this time.     Goals  6 weeks  1. Optimize hip and knee strength of LLE to improve body mechanics. - MET  2. Normalize flexibility of LLE to improve body mechanics. - MET  3. Tolerate negotiating stairs without pain or difficulty to allow resuming PLOF. - improved  4. Independent with HEP at discharge. - MET      Plan  Discharge to [facility]        Subjective Evaluation  Patient reports she hadn't fallen in a while going up the stairs but then did over the weekend. She can't really tell if she feels any stronger.     Patient Goals  Patient goals for therapy: increased strength    Pain  Current pain ratin  At best pain ratin  At worst pain ratin  Location: L ant knee    Exercise history: walking    Treatments  No previous or current treatments    Objective     Strength/Myotome Testing     Left Hip   Planes of Motion   Flexion: 4+  Extension: 5  Abduction: 5  Adduction: 5  External rotation: 5  Internal rotation: 5    Right Hip   Planes of Motion   Flexion: 5  Extension: 5  Abduction: 5  Adduction: 5  External rotation: 5  Internal rotation: 5    Left Knee   Flexion: 5  Prone flexion: 5  Extension: 5    Right Knee   Flexion: 5  Extension: 5    Left Ankle/Foot   Dorsiflexion: 4+    Right Ankle/Foot   Dorsiflexion: 5    Tests     Left Hip   Negative Jan.      Precautions: LLE weakness      Manuals        "12/27 1/2 1/4   ITB tiger tail KT       AFB KT KT                                          Neuro Re-Ed                                                                                                        Ther Ex             TA + SLRx4 mat 3x10 ea B/L 2# 3x10 ea B/L 2#      3x10  Ea  B/L 1# 3x10 ea B/L 1.5# 3x10 ea B/L 2#   Clam Blue 5\"x30        Grn  5\"x30 5\"x30 grn 5\"x30 blue   ITB str w/ strap             Bridge w/ ball sq 5\"x30       5\"x30 5\"x30 5\"x30   TR x30       x30 x30 x30   Cone tap                          Pt ed             Ther Activity             Bike warm up 10' 10'      10' 10' 10'   Squat over mat STS  20x 30x       10x 20x   Step up F/L/C 6\"x20 ea 8\" x20 ea       6\" 10x ea 6\" 20x ea                Gait Training                                       Modalities                                                              "

## 2024-01-15 ENCOUNTER — APPOINTMENT (OUTPATIENT)
Dept: PHYSICAL THERAPY | Facility: CLINIC | Age: 64
End: 2024-01-15
Payer: MEDICARE

## 2024-01-15 DIAGNOSIS — Z12.31 BREAST CANCER SCREENING BY MAMMOGRAM: ICD-10-CM

## 2024-01-17 ENCOUNTER — APPOINTMENT (OUTPATIENT)
Dept: PHYSICAL THERAPY | Facility: CLINIC | Age: 64
End: 2024-01-17
Payer: MEDICARE

## 2024-02-06 RX ORDER — DEXAMETHASONE SODIUM PHOSPHATE 4 MG/ML
INJECTION, SOLUTION INTRA-ARTICULAR; INTRALESIONAL; INTRAMUSCULAR; INTRAVENOUS; SOFT TISSUE
COMMUNITY
Start: 2023-11-30

## 2024-02-06 RX ORDER — TRIAMCINOLONE ACETONIDE 40 MG/ML
INJECTION, SUSPENSION INTRA-ARTICULAR; INTRAMUSCULAR
COMMUNITY
Start: 2023-11-30

## 2024-02-06 RX ORDER — LIDOCAINE HYDROCHLORIDE 10 MG/ML
INJECTION, SOLUTION INFILTRATION; PERINEURAL
COMMUNITY
Start: 2023-11-30

## 2024-02-06 RX ORDER — HYDROCORTISONE VALERATE CREAM 2 MG/G
CREAM TOPICAL
COMMUNITY

## 2024-02-07 ENCOUNTER — OFFICE VISIT (OUTPATIENT)
Age: 64
End: 2024-02-07
Payer: MEDICARE

## 2024-02-07 VITALS
WEIGHT: 125.4 LBS | TEMPERATURE: 97.4 F | HEART RATE: 83 BPM | SYSTOLIC BLOOD PRESSURE: 120 MMHG | OXYGEN SATURATION: 98 % | BODY MASS INDEX: 22.21 KG/M2 | DIASTOLIC BLOOD PRESSURE: 70 MMHG

## 2024-02-07 DIAGNOSIS — R05.3 CHRONIC COUGH: ICD-10-CM

## 2024-02-07 DIAGNOSIS — J45.40 MODERATE PERSISTENT ASTHMA WITHOUT COMPLICATION: ICD-10-CM

## 2024-02-07 PROCEDURE — 99213 OFFICE O/P EST LOW 20 MIN: CPT | Performed by: INTERNAL MEDICINE

## 2024-02-07 RX ORDER — MONTELUKAST SODIUM 10 MG/1
10 TABLET ORAL
Qty: 90 TABLET | Refills: 1 | Status: SHIPPED | OUTPATIENT
Start: 2024-02-07

## 2024-02-07 RX ORDER — CLOBETASOL PROPIONATE 0.5 MG/G
OINTMENT TOPICAL
COMMUNITY
Start: 2024-01-25
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
## 2024-02-07 NOTE — PATIENT INSTRUCTIONS
Montelukast (By mouth)   Montelukast ([id number]-[id number]-kast)  Prevents and treats asthma. Also prevents exercise-induced bronchoconstriction and treats symptoms caused by allergic rhinitis (hay fever).   Brand Name(s): Singulair   There may be other brand names for this medicine.  When This Medicine Should Not Be Used:   This medicine is not right for everyone. Do not use it if you had an allergic reaction to montelukast.  How to Use This Medicine:   Packet, Tablet, Chewable Tablet  Your doctor will tell you how much medicine to use. Do not use more than directed.  Read and follow the patient instructions that come with this medicine. Talk to your doctor or pharmacist if you have any questions.  Oral granules: Do not open the packet until you are ready to use it. You can give the oral granules in one of three ways.  Put the oral granules directly on a spoon, then into the patient's mouth.  Mix the granules with baby formula or breast milk that is cold or room temperature. Do not mix the granules with any other liquid.  Mix the granules with applesauce, mashed carrots, rice, or ice cream. Do not mix the granules with any other type of soft food.  If the medicine is mixed with formula, breast milk, or food, use it right away. Do not save any mixed medicine to use later.  Missed dose: If you miss a dose of this medicine, skip the missed dose and go back to your regular dosing schedule. Do not double doses.  Store the medicine in the original container at room temperature, away from heat and direct light.  Drugs and Foods to Avoid:   Ask your doctor or pharmacist before using any other medicine, including over-the-counter medicines, vitamins, and herbal products.  Some medicines can affect how montelukast works. Tell your doctor if you are using aspirin, phenobarbital, NSAIDs, or a steroid medicine.  Warnings While Using This Medicine:   Tell your doctor if you are pregnant or breastfeeding, or if you have liver disease or a  condition called phenylketonuria (PKU). Tell your doctor if you have a history of depression or mental health problems.  This medicine will not stop an asthma attack that has already started. Your doctor may prescribe another medicine for a sudden asthma attack.  This medicine may cause the following problems:  Changes in mood or behavior, including agitation, aggression, depression, sleep disturbances, suicidal thoughts or behavior  Increased certain white blood cells (eosinophils), which may cause Churg-Rafael syndrome (blood vessel disease)  If you use a corticosteroid medicine to control your asthma, keep using it as instructed by your doctor.  If any of your asthma medicines do not seem to be working as well as usual, call your doctor right away. Do not change your doses or stop using your medicines without asking your doctor.  This medicine may make you dizzy or drowsy. Do not drive or do anything else that could be dangerous until you know how this medicine affects you.  Keep all medicine out of the reach of children. Never share your medicine with anyone.  Possible Side Effects While Using This Medicine:   Call your doctor right away if you notice any of these side effects:  Allergic reaction: Itching or hives, swelling in your face or hands, swelling or tingling in your mouth or throat, chest tightness, trouble breathing  Blistering, peeling, red skin rash  Chest pain, fast, pounding, or uneven heartbeat  Fever, chills, cough, runny or stuffy nose, sore throat, body aches  Numbness or tingling in the hands, arms, legs, or feet  Pain and swelling in your sinuses  Pain, redness, or swelling in the ear  Restlessness, anxiety, irritability, mood or behavior changes, or thoughts of hurting yourself or others  Sudden and severe stomach pain, nausea, vomiting, lightheadedness  Unusual bleeding or bruising  If you notice these less serious side effects, talk with your doctor:   Diarrhea  Headache  If you notice  other side effects that you think are caused by this medicine, tell your doctor.   Call your doctor for medical advice about side effects. You may report side effects to FDA at 8-966-VMA-4362  © Copyright [employer] 2023 Information is for End User's use only and may not be sold, redistributed or otherwise used for commercial purposes.  The above information is an  only. It is not intended as medical advice for individual conditions or treatments. Talk to your doctor, nurse or pharmacist before following any medical regimen to see if it is safe and effective for you.

## 2024-06-05 ENCOUNTER — OFFICE VISIT (OUTPATIENT)
Dept: OBGYN CLINIC | Facility: CLINIC | Age: 64
End: 2024-06-05
Payer: MEDICARE

## 2024-06-05 VITALS
SYSTOLIC BLOOD PRESSURE: 108 MMHG | WEIGHT: 120.6 LBS | HEIGHT: 63 IN | BODY MASS INDEX: 21.37 KG/M2 | DIASTOLIC BLOOD PRESSURE: 60 MMHG

## 2024-06-05 DIAGNOSIS — Z12.31 ENCOUNTER FOR SCREENING MAMMOGRAM FOR MALIGNANT NEOPLASM OF BREAST: ICD-10-CM

## 2024-06-05 DIAGNOSIS — Z80.3 FAMILY HISTORY OF BREAST CANCER: ICD-10-CM

## 2024-06-05 DIAGNOSIS — R32 URINARY INCONTINENCE IN FEMALE: ICD-10-CM

## 2024-06-05 DIAGNOSIS — Z79.890 HORMONE REPLACEMENT THERAPY (HRT): Primary | ICD-10-CM

## 2024-06-05 PROCEDURE — 99213 OFFICE O/P EST LOW 20 MIN: CPT | Performed by: OBSTETRICS & GYNECOLOGY

## 2024-06-05 RX ORDER — VITAMIN B COMPLEX
1 CAPSULE ORAL DAILY
COMMUNITY

## 2024-06-05 NOTE — ASSESSMENT & PLAN NOTE
On patch for HRT since 1991 NASREEN WISE.  Has tried stopping in past but symptoms always return.  Is on lowest dose of patch.    Reviewed w/ pt risks are increase risk in VTE, stroke, CAD and breast cancer (risks for estrogen only, pt after hysterectomy, are only stroke and VTE).  Studies show these risks increase with age at treatment, age at start of treatment and prolonged use.  Risks can be limited by limiting duration of therapy to 5 yrs.  Benefit are reduction hot flashes/night sweats, improve general well being and energy, decrease dysparunia, decrease fracture and colon cancer risk.   Pt wishes to continue on HRT.  Expressed understanding of risk and benefits of continuing medication.  Refilled 1 yr.

## 2024-06-05 NOTE — ASSESSMENT & PLAN NOTE
"C/o stress incontinence symptoms with cough or change in position.  Did try pelvic floor PT but did not feel helped.  Asking about \"sling\".  Reviewed if symptoms mostly stress incontinence, sling may be helpful.  Pt with h/o NASREEN WISE in 1991 for endometriosis.  Provided referral urogyn.  "
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
## 2024-08-19 DIAGNOSIS — J45.40 MODERATE PERSISTENT ASTHMA WITHOUT COMPLICATION: ICD-10-CM

## 2024-08-19 DIAGNOSIS — R05.3 CHRONIC COUGH: ICD-10-CM

## 2024-08-19 RX ORDER — MONTELUKAST SODIUM 10 MG/1
10 TABLET ORAL
Qty: 90 TABLET | Refills: 1 | Status: SHIPPED | OUTPATIENT
Start: 2024-08-19

## 2024-11-05 ENCOUNTER — HOSPITAL ENCOUNTER (OUTPATIENT)
Dept: NON INVASIVE DIAGNOSTICS | Facility: HOSPITAL | Age: 64
Discharge: HOME | End: 2024-11-05
Attending: FAMILY MEDICINE
Payer: MEDICARE

## 2024-11-05 DIAGNOSIS — R55 SYNCOPE AND COLLAPSE: ICD-10-CM

## 2024-11-05 PROCEDURE — 93880 EXTRACRANIAL BILAT STUDY: CPT | Performed by: SURGERY

## 2024-11-05 PROCEDURE — 93880 EXTRACRANIAL BILAT STUDY: CPT

## 2024-11-12 ENCOUNTER — TELEPHONE (OUTPATIENT)
Age: 64
End: 2024-11-12

## 2024-11-20 NOTE — PROGRESS NOTES
Daily Note     Today's date: 3/14/2023  Patient name: Marilee Kinsey  : 1960  MRN: [id number]  Referring provider: Helen Guzman  Dx:   Encounter Diagnosis     ICD-10-CM    1  Mid back pain  M54 9       2  Thoracic radiculopathy  M54 14                      Subjective: Pt reports she feels PT is flaring up other parts of her back  Her right SI has been bothering her  She will be getting a T9 injection sometime in April  Objective: See treatment diary below      Assessment: Pt with pain during any activities involving rotation of spine  Recommended trying sagittal plane stretching primarily at this time  Decreased pain after thoracic extension; better able to tolerate seated and at wall due to difficulty with mat transfers  Plan: Continue per plan of care        Precautions: multiple lumbar spine surgeries      Manuals 3/8 3/10 3/14          PA glides to thoracic             Transverse glides to thoracic                                       Neuro Re-Ed             Posture tband  GTB x15ea grn x15 ea w/ TA          Cane ext with scap sq  10x10" 10x10"          TA   5"x5 standing                                                              Ther Ex             UBE (back)  6' 6'          LTR 5"x15 5"x15 hold          PPT with ball sq 5"x15 5"x20 5"x20          Bridging  x15 w/ TA 5"x15          Supine SLR  x15ea w/ TA x15 ea          Open book stretch  3x30" ea hold          Doorway stretch  3x30" 3x30"          Seated thoracic stretch 3x30" 3x30" 10x10"          Thoracic extension elbows against wall   10x          SL clamshells             Prone press up T/S focus   10x           Prone prop   3'          Thoracic foam roller protocol             Ther Activity
normal...

## 2024-12-24 DIAGNOSIS — Z12.31 ENCOUNTER FOR SCREENING MAMMOGRAM FOR MALIGNANT NEOPLASM OF BREAST: ICD-10-CM

## 2024-12-24 DIAGNOSIS — Z80.3 FAMILY HISTORY OF BREAST CANCER: ICD-10-CM

## 2024-12-25 ENCOUNTER — RESULTS FOLLOW-UP (OUTPATIENT)
Dept: LABOR AND DELIVERY | Facility: HOSPITAL | Age: 64
End: 2024-12-25

## 2025-02-10 ENCOUNTER — OFFICE VISIT (OUTPATIENT)
Age: 65
End: 2025-02-10
Payer: MEDICARE

## 2025-02-10 VITALS
BODY MASS INDEX: 20.3 KG/M2 | OXYGEN SATURATION: 96 % | HEIGHT: 63 IN | HEART RATE: 78 BPM | WEIGHT: 114.6 LBS | SYSTOLIC BLOOD PRESSURE: 102 MMHG | DIASTOLIC BLOOD PRESSURE: 64 MMHG

## 2025-02-10 DIAGNOSIS — R05.3 CHRONIC COUGH: ICD-10-CM

## 2025-02-10 DIAGNOSIS — J30.0 VASOMOTOR RHINITIS: ICD-10-CM

## 2025-02-10 DIAGNOSIS — J45.40 MODERATE PERSISTENT ASTHMA WITHOUT COMPLICATION: Primary | ICD-10-CM

## 2025-02-10 DIAGNOSIS — J30.2 SEASONAL ALLERGIC RHINITIS, UNSPECIFIED TRIGGER: ICD-10-CM

## 2025-02-10 PROCEDURE — G2211 COMPLEX E/M VISIT ADD ON: HCPCS | Performed by: INTERNAL MEDICINE

## 2025-02-10 PROCEDURE — 99214 OFFICE O/P EST MOD 30 MIN: CPT | Performed by: INTERNAL MEDICINE

## 2025-02-10 RX ORDER — IPRATROPIUM BROMIDE 42 UG/1
2 SPRAY, METERED NASAL 4 TIMES DAILY
Qty: 22 ML | Refills: 1 | Status: SHIPPED | OUTPATIENT
Start: 2025-02-10
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
## 2025-02-10 NOTE — ASSESSMENT & PLAN NOTE
For the most part has been doing well only on Singulair  Has been having persistent nasal drip she is tried corticosteroidsintranasal and already on Singulair with no significant improvement that is been triggering her cough sometimes during the day  I would like to start her on a trial of intranasal Atrovent for possible vasomotor rhinitis

## 2025-02-10 NOTE — ASSESSMENT & PLAN NOTE
And Atrovent trial as detailed above  Orders:    ipratropium (ATROVENT) 0.06 % nasal spray; 2 sprays into each nostril 4 (four) times a day

## 2025-02-10 NOTE — PATIENT INSTRUCTIONS
"Patient Education     Environmental allergies in adults   The Basics   Written by the doctors and editors at [facility]   What are environmental allergies? -- Environmental allergies are a group of conditions that can cause sneezing, stuffy or runny nose, and itchy eyes. They are caused by allergies to things in our surroundings, such as in the home and outdoors. Normally, people breathe in these substances without a problem. But when a person has an environmental allergy, their immune system acts as if the substance is harmful to the body. This causes symptoms.  Some people have allergy symptoms all year long. Year-round symptoms are usually caused by allergies to:   Insects, such as dust mites and cockroaches   Animals, such as cats and dogs   Mold spores  Other people have symptoms only during certain times of the year, when the thing that they are allergic to is around. These allergies might be called \"seasonal allergies.\" Some people also use the term \"hay fever.\" Seasonal allergy symptoms are caused by:   Pollens from trees, grasses, or weeds (figure 1)   Mold spores, which are in the air when the weather is humid, or after rain  Many people first get environmental allergies when they are children or young adults. Environmental allergies are lifelong, but symptoms can get better or worse over time. Environmental allergies sometimes run in families.  What are the symptoms of environmental allergies? -- Symptoms of environmental allergies can include:   Stuffy nose, runny nose, or sneezing a lot   Itchy or red eyes   Sore throat, or itching of the throat or ears   Waking up at night or trouble sleeping, which can lead to feeling tired during the day  Is there a test for environmental allergies? -- Yes. Your doctor will ask about your symptoms and do an exam. They might order other tests, such as allergy skin testing, which can help the doctor figure out what you are allergic to. During a skin test, a doctor will " put a drop of the substance that you might be allergic to on your skin, and make a tiny prick in the skin. Then, they will watch your skin to see if it turns red and bumpy.  How are environmental allergies treated? -- People with environmental allergies might use 1 or more of the following treatments to help reduce their symptoms:   Nose rinses - Rinsing out the nose with salt water cleans the inside of the nose and gets rid of pollen in the nose. Different devices can be used to rinse the nose.   Steroid nose sprays - Doctors often recommend these sprays first, because they are the best treatment for stuffy nose. Many of these sprays are available without a prescription. Steroid nose sprays work best if you use them every day, and it can take a few days for them to work fully. Steroid nose sprays are more effective than other allergy medicines for stuffy nose and post-nasal drip. (Post-nasal drip is when mucus runs down the back of your throat.)   Antihistamines - These medicines help stop itching, sneezing, and runny nose symptoms. They don't treat stuffy nose as well as steroid nose sprays. Some antihistamines can make people feel tired.   Antihistamine eye drops - These medicines are available without a prescription. They can help with eyes that feel itchy or gritty.   Decongestants - These medicines can reduce stuffy nose symptoms. People with certain health problems, such as high blood pressure, should not take decongestants. Also, people should not use decongestant nose sprays for more than 3 days in a row. Using decongestant nose sprays for more than 3 days in a row can make symptoms worse.   Allergy shots - Some people with environmental allergies choose to get allergy shots. Usually, allergy shots are given every week or month by an allergy doctor. They contain tiny amounts of allergens, such as pollen. Many people find that this treatment reduces their symptoms, but it can take months to work.   Allergy  "pills (under the tongue) - For some types of pollen allergies, there are pills that work much like allergy shots. These pills need to be prescribed by a doctor. They are made to dissolve under the tongue. They are taken every day for several months of the year.  Talk with your doctor or nurse about the benefits and downsides of the different treatments. The right treatment for you will depend a lot on your symptoms and other health problems. It is also important to talk with your doctor or nurse about when and how to use your medicines.  Can environmental allergy symptoms be prevented? -- Yes. If you get symptoms at the same time every year, talk with your doctor or nurse. Some people can prevent symptoms by starting their medicine a week or 2 before that time of the year.  You can also help prevent symptoms by avoiding the things that you are allergic to. For example, if you are allergic to pollen, you can:   Stay inside during the times of the year when you have symptoms.   Keep car and house windows closed, and use air conditioning instead.   Take a shower before bed to rinse pollen off of your hair and skin.   Wear a dust mask if you need to be outside.  If you are allergic to dust, dust mites, mold, or pets, you can:   Wash bedding every week in hot water with detergent, or dry it in a dryer on the hot setting. If possible, use a comforter or blanket that can be washed.   Cover pillows and mattresses with vinyl covers to protect yourself from dust mites.   Use fewer items that collect dust, especially in the bedroom - These include curtains, bed skirts, carpet or rugs, and stuffed animals.   Clean air conditioner and furnace filters regularly.   Vacuum every week using a vacuum with a high-efficiency particulate air (\"HEPA\") filter.   Keep pets out of the home, if you can - Keeping pets only out of certain rooms might help a bit, but usually does not remove animal allergens from your home.   Bathe dogs each " week - This might help reduce your symptoms. Bathing cats will probably not reduce your symptoms.  What if I want to get pregnant? -- If you want to get pregnant, talk with your doctor about which medicines are safe to take during pregnancy. Environmental allergy symptoms can get worse, get better, or stay the same during pregnancy.  When should I call the doctor? -- Call your doctor or nurse for advice if you:   Have a fever of 100.4°F (38°C) or higher, or chills   Have green or yellow mucus  These symptoms could mean that you have an infection and not just allergies.  All topics are updated as new evidence becomes available and our peer review process is complete.  This topic retrieved from [employer] on: Feb 28, 2024.  Topic 29464 Version 15.0  Release: 32.2.4 - C32.58  © 2024 UpToDate, Inc. and/or its affiliates. All rights reserved.  figure 1: Common causes of seasonal allergies     Graphic 13696 Version 3.0  Consumer Information Use and Disclaimer   Disclaimer: This generalized information is a limited summary of diagnosis, treatment, and/or medication information. It is not meant to be comprehensive and should be used as a tool to help the user understand and/or assess potential diagnostic and treatment options. It does NOT include all information about conditions, treatments, medications, side effects, or risks that may apply to a specific patient. It is not intended to be medical advice or a substitute for the medical advice, diagnosis, or treatment of a health care provider based on the health care provider's examination and assessment of a patient's specific and unique circumstances. Patients must speak with a health care provider for complete information about their health, medical questions, and treatment options, including any risks or benefits regarding use of medications. This information does not endorse any treatments or medications as safe, effective, or approved for treating a specific patient.  UpToDate, Inc. and its affiliates disclaim any warranty or liability relating to this information or the use thereof.The use of this information is governed by the Terms of Use, available at https://www.wolterskluwer.com/en/know/clinical-effectiveness-terms. 2024© UpToDate, Inc. and its affiliates and/or licensors. All rights reserved.  Copyright   © 2024 UpToDate, Inc. and/or its affiliates. All rights reserved.

## 2025-03-04 DIAGNOSIS — J30.0 VASOMOTOR RHINITIS: ICD-10-CM

## 2025-03-05 RX ORDER — IPRATROPIUM BROMIDE 42 UG/1
SPRAY, METERED NASAL
Qty: 15 ML | Refills: 1 | Status: SHIPPED | OUTPATIENT
Start: 2025-03-05

## 2025-06-10 ENCOUNTER — ANNUAL EXAM (OUTPATIENT)
Dept: OBGYN CLINIC | Facility: CLINIC | Age: 65
End: 2025-06-10
Payer: MEDICARE

## 2025-06-10 VITALS
HEIGHT: 63 IN | SYSTOLIC BLOOD PRESSURE: 110 MMHG | DIASTOLIC BLOOD PRESSURE: 74 MMHG | WEIGHT: 115 LBS | BODY MASS INDEX: 20.38 KG/M2

## 2025-06-10 DIAGNOSIS — Z01.419 ENCOUNTER FOR ANNUAL ROUTINE GYNECOLOGICAL EXAMINATION: Primary | ICD-10-CM

## 2025-06-10 DIAGNOSIS — R32 URINARY INCONTINENCE IN FEMALE: ICD-10-CM

## 2025-06-10 DIAGNOSIS — Z79.890 HORMONE REPLACEMENT THERAPY (HRT): ICD-10-CM

## 2025-06-10 DIAGNOSIS — Z12.31 ENCOUNTER FOR SCREENING MAMMOGRAM FOR MALIGNANT NEOPLASM OF BREAST: ICD-10-CM

## 2025-06-10 PROCEDURE — G0101 CA SCREEN;PELVIC/BREAST EXAM: HCPCS | Performed by: OBSTETRICS & GYNECOLOGY

## 2025-06-10 RX ORDER — ESTRADIOL 0.1 MG/G
CREAM VAGINAL
Qty: 42.5 G | Refills: 4 | Status: SHIPPED | OUTPATIENT
Start: 2025-06-10

## 2025-06-10 RX ORDER — ALBUTEROL SULFATE 90 UG/1
INHALANT RESPIRATORY (INHALATION)
COMMUNITY
Start: 2025-01-15

## 2025-06-10 RX ORDER — ESTRADIOL 0.03 MG/D
1 PATCH TRANSDERMAL WEEKLY
Qty: 12 PATCH | Refills: 4 | Status: SHIPPED | OUTPATIENT
Start: 2025-06-10 | End: 2026-08-04

## 2025-06-10 NOTE — ASSESSMENT & PLAN NOTE
11/2024 sling placed by Dr. Haq.  Very happy, no further BEATRIZ.  Per pt Dr. Haq recommend considering vaginal estrogen (small amount on finger) 3x/week after sling, even on systemic HRT.  Refilled of vaginal estrogen script printed.    Orders:    estradiol (ESTRACE) 0.1 mg/g vaginal cream; Use 0.5 gram on finger in vagina 3x/week.
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
## 2025-06-10 NOTE — ASSESSMENT & PLAN NOTE
Pt currently on HRT to menopausal sx. Patient reports improvement in prior symptoms.  Tried to go off in past and had hot flashes.  Reviewed w/ pt risks are increase risk in VTE, stroke, CAD and breast cancer (risks for estrogen only, pt after hysterectomy, are only stroke and VTE).  Studies show these risks increase with age at treatment, age at start of treatment and prolonged use.  Risks can be limited by limiting duration of therapy to 5 yrs.  Benefit are reduction hot flashes/night sweats, improve general well being and energy, decrease dysparunia, decrease fracture and colon cancer risk.   Pt wishes to continue on HRT.  Expressed understanding of risk and benefits of continuing medication.  Refilled 1 yr.    Orders:    estradiol (CLIMARA) 0.025 mg/24 hr; Place 1 patch on the skin once a week over 7 days

## 2025-06-10 NOTE — PATIENT INSTRUCTIONS
- Maintain healthy weight with BMI ideally between 18-25.    - Eat a healthy diet, including multiple servings of vegetables and fruits, as well as lean protein sources.  - Get at least 150 minutes of moderate aerobic activity or 75 minutes of vigorous aerobic activity a week, or a combination of moderate and vigorous activity.  Greater amounts of exercise will provide an even greater health benefit.   - Ensure diet provides 1200mg of Calcium daily (divided) and 800IU of vitamin D, or take supplements to meet this.  - Safe sex practices recommended.    - Resources - information on birth control and sexually transmitted infections - www.bedsider.org            - information on sexually transmitted infections - www.cdc.gov/std/    You currently have a Medicare Insurance (or a plan that act like Medicare):    - Medicare like plans pay for wellness visit q 2 yrs (this is a regular screening visit with no problems being treated)    - but patient may return for problems as needed and if we are treating a problem we may continue to see you yearly    - I recommend annual mammogram screening and Medicare will pay for it annually.          - If not seen by our office every year, please call our office on the year not seen and ask for a screening mammogram order.          - This will be provided as long as you don't have any breast concerns.  If you have a breast problem, you need an exam first.
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
## 2025-06-19 ENCOUNTER — APPOINTMENT (OUTPATIENT)
Dept: RADIOLOGY | Facility: CLINIC | Age: 65
End: 2025-06-19
Attending: NURSE PRACTITIONER
Payer: MEDICARE

## 2025-06-19 ENCOUNTER — OFFICE VISIT (OUTPATIENT)
Dept: URGENT CARE | Facility: CLINIC | Age: 65
End: 2025-06-19
Payer: MEDICARE

## 2025-06-19 VITALS
TEMPERATURE: 98.1 F | OXYGEN SATURATION: 98 % | SYSTOLIC BLOOD PRESSURE: 123 MMHG | RESPIRATION RATE: 18 BRPM | HEART RATE: 73 BPM | DIASTOLIC BLOOD PRESSURE: 56 MMHG

## 2025-06-19 DIAGNOSIS — S62.606A CLOSED DISPLACED FRACTURE OF PHALANX OF RIGHT LITTLE FINGER, UNSPECIFIED PHALANX, INITIAL ENCOUNTER: Primary | ICD-10-CM

## 2025-06-19 DIAGNOSIS — S69.91XA INJURY OF FINGER OF RIGHT HAND, INITIAL ENCOUNTER: ICD-10-CM

## 2025-06-19 PROCEDURE — 73140 X-RAY EXAM OF FINGER(S): CPT

## 2025-06-19 PROCEDURE — 29130 APPL FINGER SPLINT STATIC: CPT | Performed by: NURSE PRACTITIONER

## 2025-06-19 PROCEDURE — 99214 OFFICE O/P EST MOD 30 MIN: CPT | Performed by: NURSE PRACTITIONER

## 2025-06-19 PROCEDURE — G0463 HOSPITAL OUTPT CLINIC VISIT: HCPCS | Performed by: NURSE PRACTITIONER
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
## 2025-06-20 ENCOUNTER — RESULTS FOLLOW-UP (OUTPATIENT)
Dept: URGENT CARE | Facility: CLINIC | Age: 65
End: 2025-06-20

## 2025-07-24 ENCOUNTER — TELEPHONE (OUTPATIENT)
Age: 65
End: 2025-07-24

## 2025-07-24 NOTE — TELEPHONE ENCOUNTER
Lvm for recall appt pt was to be seen by miky in sept. Appts available in oct unless pt will see a AP.   Problem: Patient Care Overview  Goal: Plan of Care Review  Outcome: Ongoing (interventions implemented as appropriate)  Patient on RA, no respiratory distress noted. Will continue to monitor.

## (undated) DEVICE — 2000CC GUARDIAN II: Brand: GUARDIAN

## (undated) DEVICE — GLOVE INDICATOR PI UNDERGLOVE SZ 8 BLUE

## (undated) DEVICE — TUNNELING TOOL 20IN

## (undated) DEVICE — PLASTIC ADHESIVE BANDAGE: Brand: CURITY

## (undated) DEVICE — ELECTRODE BLADE MOD  E-Z CLEAN 6.5IN -0014M

## (undated) DEVICE — SPONGE PVP SCRUB WING STERILE

## (undated) DEVICE — VIAL DECANTER

## (undated) DEVICE — SUT SILK 2-0 SH 30 IN K833H

## (undated) DEVICE — INTENDED FOR TISSUE SEPARATION, AND OTHER PROCEDURES THAT REQUIRE A SHARP SURGICAL BLADE TO PUNCTURE OR CUT.: Brand: BARD-PARKER SAFETY BLADES SIZE 10, STERILE

## (undated) DEVICE — PREP SURGICAL PURPREP 26ML

## (undated) DEVICE — SUT VICRYL PLUS 3-0 RB-1 CR/8 18 IN VCP713D

## (undated) DEVICE — GLOVE SRG BIOGEL 8

## (undated) DEVICE — PROXIMATE PLUS MD MULTI-DIRECTIONAL RELEASE SKIN STAPLERS CONTAINS 35 STAINLESS STEEL STAPLES APPROXIMATE CLOSED DIMENSIONS: 6.9MM X 3.9MM WIDE: Brand: PROXIMATE

## (undated) DEVICE — NEEDLE 18 G X 1 1/2

## (undated) DEVICE — BETHLEHEM UNIVERSAL SPINE, KIT: Brand: CARDINAL HEALTH

## (undated) DEVICE — INTENDED FOR TISSUE SEPARATION, AND OTHER PROCEDURES THAT REQUIRE A SHARP SURGICAL BLADE TO PUNCTURE OR CUT.: Brand: BARD-PARKER ® CARBON RIB-BACK BLADES

## (undated) DEVICE — 3M™ TEGADERM™ TRANSPARENT FILM DRESSING FRAME STYLE, 1626W, 4 IN X 4-3/4 IN (10 CM X 12 CM), 50/CT 4CT/CASE: Brand: 3M™ TEGADERM™

## (undated) DEVICE — GLOVE INDICATOR PI UNDERGLOVE SZ 8.5 BLUE

## (undated) DEVICE — TELFA NON-ADHERENT ABSORBENT DRESSING: Brand: TELFA

## (undated) DEVICE — SNAP KOVER: Brand: UNBRANDED

## (undated) DEVICE — BETADINE SCRUB BRUSH

## (undated) DEVICE — PENCIL ELECTROSURG E-Z CLEAN -0035H

## (undated) DEVICE — DRAPE C-ARM X-RAY

## (undated) DEVICE — TOOL 14MH30 LEGEND 14CM 3MM: Brand: MIDAS REX ™

## (undated) DEVICE — GLOVE INDICATOR PI UNDERGLOVE SZ 7 BLUE

## (undated) DEVICE — ANTIBACTERIAL VIOLET BRAIDED (POLYGLACTIN 910), SYNTHETIC ABSORBABLE SUTURE: Brand: COATED VICRYL

## (undated) DEVICE — NEUROSTIM MULTILEAD TRIAL CABLE

## (undated) DEVICE — PROGRAMMER PATIENT PROCLAIM

## (undated) DEVICE — TOOL 15BA50 LEGEND 15CM 5MM BA: Brand: MIDAS REX™

## (undated) DEVICE — TIBURON SPLIT SHEET: Brand: CONVERTORS

## (undated) DEVICE — GLOVE SRG BIOGEL 7

## (undated) DEVICE — DRAPE MICROSCOPE OPMI PENTERO

## (undated) DEVICE — HEMOSTATIC MATRIX SURGIFLO 8ML W/THROMBIN

## (undated) DEVICE — SYRINGE 3ML LL

## (undated) DEVICE — SCD SEQUENTIAL COMPRESSION COMFORT SLEEVE MEDIUM KNEE LENGTH: Brand: KENDALL SCD

## (undated) DEVICE — FLOSEAL MATRIX IS INDICATED IN SURGICAL PROCEDURES (OTHER THAN IN OPHTHALMIC) AS AN ADJUNCT TO HEMOSTASIS WHEN CONTROL OF BLEEDING BY LIGATURE OR CONVENTIONAL PROCEDURES IS INEFFECTIVE OR IMPRACTICAL.: Brand: FLOSEAL HEMOSTATIC MATRIX

## (undated) DEVICE — DRAPE SHEET X-LG

## (undated) DEVICE — INTENDED FOR TISSUE SEPARATION, AND OTHER PROCEDURES THAT REQUIRE A SHARP SURGICAL BLADE TO PUNCTURE OR CUT.: Brand: BARD-PARKER SAFETY BLADES SIZE 15, STERILE

## (undated) DEVICE — 3M™ STERI-STRIP™ REINFORCED ADHESIVE SKIN CLOSURES, R1547, 1/2 IN X 4 IN (12 MM X 100 MM), 6 STRIPS/ENVELOPE: Brand: 3M™ STERI-STRIP™

## (undated) DEVICE — SURGIFOAM 7 X 12 SPONGE ABS

## (undated) DEVICE — DISPOSABLE EQUIPMENT COVER: Brand: SMALL TOWEL DRAPE

## (undated) DEVICE — UNIVERSAL SPINE,KIT: Brand: CARDINAL HEALTH